# Patient Record
Sex: FEMALE | Race: OTHER | NOT HISPANIC OR LATINO
[De-identification: names, ages, dates, MRNs, and addresses within clinical notes are randomized per-mention and may not be internally consistent; named-entity substitution may affect disease eponyms.]

---

## 2024-01-03 PROBLEM — Z00.00 ENCOUNTER FOR PREVENTIVE HEALTH EXAMINATION: Status: ACTIVE | Noted: 2024-01-03

## 2024-01-20 ENCOUNTER — APPOINTMENT (OUTPATIENT)
Dept: ORTHOPEDIC SURGERY | Facility: CLINIC | Age: 84
End: 2024-01-20

## 2024-01-31 ENCOUNTER — APPOINTMENT (OUTPATIENT)
Dept: ORTHOPEDIC SURGERY | Facility: CLINIC | Age: 84
End: 2024-01-31
Payer: MEDICARE

## 2024-01-31 VITALS — WEIGHT: 176 LBS | BODY MASS INDEX: 29.32 KG/M2 | HEIGHT: 65 IN

## 2024-01-31 DIAGNOSIS — M17.12 UNILATERAL PRIMARY OSTEOARTHRITIS, LEFT KNEE: ICD-10-CM

## 2024-01-31 PROCEDURE — 73560 X-RAY EXAM OF KNEE 1 OR 2: CPT | Mod: LT

## 2024-01-31 PROCEDURE — 99203 OFFICE O/P NEW LOW 30 MIN: CPT

## 2024-01-31 NOTE — HISTORY OF PRESENT ILLNESS
[de-identified] : Pleasant woman 83 last visit 2019 for some sciatica and back pain Dr. Blackwell still medical doctor 4 to 5 months on again off again left knee pain occasionally takes Advil which helps she does have a cane pain is little worse at night weight is stable 175 pounds pain is 5-6 out of 10 no trauma does not smoke no drug allergies

## 2024-01-31 NOTE — IMAGING
[de-identified] : Pleasant knees were examined in no distress good posture mild spasm negative straight leg bilaterally has a cane in the proper right hand hip motion is full knee motion on the left limited with some swelling crepitus no instability or laxity mild pain medially calves are soft no edema negative Homans' sign reflexes brisk and symmetric  X-rays left knee mild arthritic change

## 2024-01-31 NOTE — ASSESSMENT
[FreeTextEntry1] : Mild arthritis recommended heat cane opposite hand Ace wrap is fine deferred on therapy with lacho albrecht she could try Mobic for a couple of months on a regular basis we could consider an injection next visit I saw no indication for knee replacement or an MRI at this time

## 2024-02-02 ENCOUNTER — INPATIENT (INPATIENT)
Facility: HOSPITAL | Age: 84
LOS: 4 days | Discharge: HOME | DRG: 66 | End: 2024-02-07
Attending: INTERNAL MEDICINE | Admitting: STUDENT IN AN ORGANIZED HEALTH CARE EDUCATION/TRAINING PROGRAM
Payer: MEDICARE

## 2024-02-02 VITALS
OXYGEN SATURATION: 98 % | HEIGHT: 65 IN | RESPIRATION RATE: 17 BRPM | SYSTOLIC BLOOD PRESSURE: 176 MMHG | TEMPERATURE: 99 F | DIASTOLIC BLOOD PRESSURE: 72 MMHG | HEART RATE: 77 BPM | WEIGHT: 179.9 LBS

## 2024-02-02 DIAGNOSIS — I63.50 CEREBRAL INFARCTION DUE TO UNSPECIFIED OCCLUSION OR STENOSIS OF UNSPECIFIED CEREBRAL ARTERY: ICD-10-CM

## 2024-02-02 LAB
ALBUMIN SERPL ELPH-MCNC: 4.2 G/DL — SIGNIFICANT CHANGE UP (ref 3.5–5.2)
ALP SERPL-CCNC: 95 U/L — SIGNIFICANT CHANGE UP (ref 30–115)
ALT FLD-CCNC: 18 U/L — SIGNIFICANT CHANGE UP (ref 0–41)
ANION GAP SERPL CALC-SCNC: 13 MMOL/L — SIGNIFICANT CHANGE UP (ref 7–14)
APPEARANCE UR: CLEAR — SIGNIFICANT CHANGE UP
AST SERPL-CCNC: 24 U/L — SIGNIFICANT CHANGE UP (ref 0–41)
BACTERIA # UR AUTO: NEGATIVE /HPF — SIGNIFICANT CHANGE UP
BASOPHILS # BLD AUTO: 0.03 K/UL — SIGNIFICANT CHANGE UP (ref 0–0.2)
BASOPHILS NFR BLD AUTO: 0.4 % — SIGNIFICANT CHANGE UP (ref 0–1)
BILIRUB SERPL-MCNC: 0.7 MG/DL — SIGNIFICANT CHANGE UP (ref 0.2–1.2)
BILIRUB UR-MCNC: NEGATIVE — SIGNIFICANT CHANGE UP
BUN SERPL-MCNC: 18 MG/DL — SIGNIFICANT CHANGE UP (ref 10–20)
CALCIUM SERPL-MCNC: 10 MG/DL — SIGNIFICANT CHANGE UP (ref 8.4–10.5)
CAST: 0 /LPF — SIGNIFICANT CHANGE UP (ref 0–4)
CHLORIDE SERPL-SCNC: 97 MMOL/L — LOW (ref 98–110)
CO2 SERPL-SCNC: 23 MMOL/L — SIGNIFICANT CHANGE UP (ref 17–32)
COLOR SPEC: YELLOW — SIGNIFICANT CHANGE UP
CREAT SERPL-MCNC: 0.7 MG/DL — SIGNIFICANT CHANGE UP (ref 0.7–1.5)
DIFF PNL FLD: NEGATIVE — SIGNIFICANT CHANGE UP
EGFR: 86 ML/MIN/1.73M2 — SIGNIFICANT CHANGE UP
EOSINOPHIL # BLD AUTO: 0.05 K/UL — SIGNIFICANT CHANGE UP (ref 0–0.7)
EOSINOPHIL NFR BLD AUTO: 0.6 % — SIGNIFICANT CHANGE UP (ref 0–8)
GLUCOSE SERPL-MCNC: 104 MG/DL — HIGH (ref 70–99)
GLUCOSE UR QL: NEGATIVE MG/DL — SIGNIFICANT CHANGE UP
HCT VFR BLD CALC: 40.2 % — SIGNIFICANT CHANGE UP (ref 37–47)
HGB BLD-MCNC: 14.2 G/DL — SIGNIFICANT CHANGE UP (ref 12–16)
IMM GRANULOCYTES NFR BLD AUTO: 0.2 % — SIGNIFICANT CHANGE UP (ref 0.1–0.3)
KETONES UR-MCNC: ABNORMAL MG/DL
LEUKOCYTE ESTERASE UR-ACNC: ABNORMAL
LIDOCAIN IGE QN: 49 U/L — SIGNIFICANT CHANGE UP (ref 7–60)
LYMPHOCYTES # BLD AUTO: 1.74 K/UL — SIGNIFICANT CHANGE UP (ref 1.2–3.4)
LYMPHOCYTES # BLD AUTO: 20.6 % — SIGNIFICANT CHANGE UP (ref 20.5–51.1)
MCHC RBC-ENTMCNC: 30.9 PG — SIGNIFICANT CHANGE UP (ref 27–31)
MCHC RBC-ENTMCNC: 35.3 G/DL — SIGNIFICANT CHANGE UP (ref 32–37)
MCV RBC AUTO: 87.4 FL — SIGNIFICANT CHANGE UP (ref 81–99)
MONOCYTES # BLD AUTO: 0.67 K/UL — HIGH (ref 0.1–0.6)
MONOCYTES NFR BLD AUTO: 7.9 % — SIGNIFICANT CHANGE UP (ref 1.7–9.3)
NEUTROPHILS # BLD AUTO: 5.92 K/UL — SIGNIFICANT CHANGE UP (ref 1.4–6.5)
NEUTROPHILS NFR BLD AUTO: 70.3 % — SIGNIFICANT CHANGE UP (ref 42.2–75.2)
NITRITE UR-MCNC: NEGATIVE — SIGNIFICANT CHANGE UP
NRBC # BLD: 0 /100 WBCS — SIGNIFICANT CHANGE UP (ref 0–0)
PH UR: 7 — SIGNIFICANT CHANGE UP (ref 5–8)
PLATELET # BLD AUTO: 207 K/UL — SIGNIFICANT CHANGE UP (ref 130–400)
PMV BLD: 10.9 FL — HIGH (ref 7.4–10.4)
POTASSIUM SERPL-MCNC: 3.7 MMOL/L — SIGNIFICANT CHANGE UP (ref 3.5–5)
POTASSIUM SERPL-SCNC: 3.7 MMOL/L — SIGNIFICANT CHANGE UP (ref 3.5–5)
PROT SERPL-MCNC: 7 G/DL — SIGNIFICANT CHANGE UP (ref 6–8)
PROT UR-MCNC: SIGNIFICANT CHANGE UP MG/DL
RBC # BLD: 4.6 M/UL — SIGNIFICANT CHANGE UP (ref 4.2–5.4)
RBC # FLD: 12.9 % — SIGNIFICANT CHANGE UP (ref 11.5–14.5)
RBC CASTS # UR COMP ASSIST: 1 /HPF — SIGNIFICANT CHANGE UP (ref 0–4)
SODIUM SERPL-SCNC: 133 MMOL/L — LOW (ref 135–146)
SP GR SPEC: 1.01 — SIGNIFICANT CHANGE UP (ref 1–1.03)
SQUAMOUS # UR AUTO: 1 /HPF — SIGNIFICANT CHANGE UP (ref 0–5)
TROPONIN T, HIGH SENSITIVITY RESULT: 16 NG/L — HIGH (ref 6–13)
UROBILINOGEN FLD QL: 0.2 MG/DL — SIGNIFICANT CHANGE UP (ref 0.2–1)
WBC # BLD: 8.43 K/UL — SIGNIFICANT CHANGE UP (ref 4.8–10.8)
WBC # FLD AUTO: 8.43 K/UL — SIGNIFICANT CHANGE UP (ref 4.8–10.8)
WBC UR QL: 2 /HPF — SIGNIFICANT CHANGE UP (ref 0–5)

## 2024-02-02 PROCEDURE — 70498 CT ANGIOGRAPHY NECK: CPT | Mod: 26,MA

## 2024-02-02 PROCEDURE — 80061 LIPID PANEL: CPT

## 2024-02-02 PROCEDURE — 83036 HEMOGLOBIN GLYCOSYLATED A1C: CPT

## 2024-02-02 PROCEDURE — 93005 ELECTROCARDIOGRAM TRACING: CPT

## 2024-02-02 PROCEDURE — 36415 COLL VENOUS BLD VENIPUNCTURE: CPT

## 2024-02-02 PROCEDURE — 70551 MRI BRAIN STEM W/O DYE: CPT

## 2024-02-02 PROCEDURE — 84443 ASSAY THYROID STIM HORMONE: CPT

## 2024-02-02 PROCEDURE — 97116 GAIT TRAINING THERAPY: CPT | Mod: GP

## 2024-02-02 PROCEDURE — 70496 CT ANGIOGRAPHY HEAD: CPT | Mod: 26,MA

## 2024-02-02 PROCEDURE — 97110 THERAPEUTIC EXERCISES: CPT | Mod: GP

## 2024-02-02 PROCEDURE — 97162 PT EVAL MOD COMPLEX 30 MIN: CPT | Mod: GP

## 2024-02-02 PROCEDURE — 93306 TTE W/DOPPLER COMPLETE: CPT

## 2024-02-02 PROCEDURE — 70450 CT HEAD/BRAIN W/O DYE: CPT

## 2024-02-02 PROCEDURE — 97530 THERAPEUTIC ACTIVITIES: CPT | Mod: GP

## 2024-02-02 PROCEDURE — 80048 BASIC METABOLIC PNL TOTAL CA: CPT

## 2024-02-02 PROCEDURE — 99291 CRITICAL CARE FIRST HOUR: CPT

## 2024-02-02 PROCEDURE — 70450 CT HEAD/BRAIN W/O DYE: CPT | Mod: 26,MA,59

## 2024-02-02 PROCEDURE — 85025 COMPLETE CBC W/AUTO DIFF WBC: CPT

## 2024-02-02 PROCEDURE — 83735 ASSAY OF MAGNESIUM: CPT

## 2024-02-02 PROCEDURE — 70551 MRI BRAIN STEM W/O DYE: CPT | Mod: 26

## 2024-02-02 PROCEDURE — 92610 EVALUATE SWALLOWING FUNCTION: CPT | Mod: GN

## 2024-02-02 PROCEDURE — 97167 OT EVAL HIGH COMPLEX 60 MIN: CPT | Mod: GO

## 2024-02-02 RX ORDER — ENOXAPARIN SODIUM 100 MG/ML
40 INJECTION SUBCUTANEOUS EVERY 24 HOURS
Refills: 0 | Status: DISCONTINUED | OUTPATIENT
Start: 2024-02-02 | End: 2024-02-07

## 2024-02-02 RX ORDER — CLOPIDOGREL BISULFATE 75 MG/1
75 TABLET, FILM COATED ORAL DAILY
Refills: 0 | Status: DISCONTINUED | OUTPATIENT
Start: 2024-02-03 | End: 2024-02-07

## 2024-02-02 RX ORDER — ASPIRIN/CALCIUM CARB/MAGNESIUM 324 MG
81 TABLET ORAL DAILY
Refills: 0 | Status: DISCONTINUED | OUTPATIENT
Start: 2024-02-03 | End: 2024-02-07

## 2024-02-02 RX ORDER — ATORVASTATIN CALCIUM 80 MG/1
80 TABLET, FILM COATED ORAL AT BEDTIME
Refills: 0 | Status: DISCONTINUED | OUTPATIENT
Start: 2024-02-02 | End: 2024-02-07

## 2024-02-02 RX ORDER — SODIUM CHLORIDE 9 MG/ML
1000 INJECTION INTRAMUSCULAR; INTRAVENOUS; SUBCUTANEOUS ONCE
Refills: 0 | Status: COMPLETED | OUTPATIENT
Start: 2024-02-02 | End: 2024-02-02

## 2024-02-02 RX ORDER — CLOPIDOGREL BISULFATE 75 MG/1
300 TABLET, FILM COATED ORAL ONCE
Refills: 0 | Status: COMPLETED | OUTPATIENT
Start: 2024-02-02 | End: 2024-02-02

## 2024-02-02 RX ORDER — ASPIRIN/CALCIUM CARB/MAGNESIUM 324 MG
324 TABLET ORAL ONCE
Refills: 0 | Status: COMPLETED | OUTPATIENT
Start: 2024-02-02 | End: 2024-02-02

## 2024-02-02 RX ORDER — ASPIRIN/CALCIUM CARB/MAGNESIUM 324 MG
300 TABLET ORAL DAILY
Refills: 0 | Status: DISCONTINUED | OUTPATIENT
Start: 2024-02-02 | End: 2024-02-02

## 2024-02-02 RX ORDER — AMLODIPINE BESYLATE 2.5 MG/1
10 TABLET ORAL DAILY
Refills: 0 | Status: DISCONTINUED | OUTPATIENT
Start: 2024-02-02 | End: 2024-02-07

## 2024-02-02 RX ADMIN — Medication 324 MILLIGRAM(S): at 16:23

## 2024-02-02 RX ADMIN — Medication 1.5 MILLIGRAM(S): at 19:37

## 2024-02-02 RX ADMIN — ATORVASTATIN CALCIUM 80 MILLIGRAM(S): 80 TABLET, FILM COATED ORAL at 21:39

## 2024-02-02 RX ADMIN — SODIUM CHLORIDE 1000 MILLILITER(S): 9 INJECTION INTRAMUSCULAR; INTRAVENOUS; SUBCUTANEOUS at 12:31

## 2024-02-02 RX ADMIN — CLOPIDOGREL BISULFATE 300 MILLIGRAM(S): 75 TABLET, FILM COATED ORAL at 16:22

## 2024-02-02 RX ADMIN — ENOXAPARIN SODIUM 40 MILLIGRAM(S): 100 INJECTION SUBCUTANEOUS at 21:39

## 2024-02-02 NOTE — ED ADULT NURSE NOTE - NSFALLUNIVINTERV_ED_ALL_ED
Bed/Stretcher in lowest position, wheels locked, appropriate side rails in place/Call bell, personal items and telephone in reach/Instruct patient to call for assistance before getting out of bed/chair/stretcher/Non-slip footwear applied when patient is off stretcher/[place] to call system/Physically safe environment - no spills, clutter or unnecessary equipment/Purposeful proactive rounding/Room/bathroom lighting operational, light cord in reach

## 2024-02-02 NOTE — H&P ADULT - HISTORY OF PRESENT ILLNESS
This is an 83 year old female with past medical history of HLD, HTN, L knee OA presenting for unsteadiness and inability to ambulate. Patient states she was in her normal state of health yesterday after and had dinner with her friends. Afterwards took her recently prescribed meloxicam for her L knee OA. Shortly after she started feeling off when walking. She reports feeling dizzy and unsteady when attempting to walk. Since yesterday, her symptoms have not improved and thus decided to seek medical help.  In the ED, vitals significant for /72  CTH notable for L cerebellar subacute infarct  CTA angio notable for Severe stenosis at the origin of the left vertebral artery, Moderate stenosis of the proximal right subclavian artery due to mixed calcified and noncalcified atherosclerotic plaque.    Admitted to stroke unit for further work up

## 2024-02-02 NOTE — H&P ADULT - NSHPLABSRESULTS_GEN_ALL_CORE
LABS:                        14.2   8.43  )-----------( 207      ( 02 Feb 2024 12:56 )             40.2     02-02    133<L>  |  97<L>  |  18  ----------------------------<  104<H>  3.7   |  23  |  0.7    Ca    10.0      02 Feb 2024 12:56    TPro  7.0  /  Alb  4.2  /  TBili  0.7  /  DBili  x   /  AST  24  /  ALT  18  /  AlkPhos  95  02-02        < from: CT Head No Cont (02.02.24 @ 14:15) >    Focal hypodensity is noted involving the left superior cerebellar   hemisphere likely reflecting acute or subacute ischemic change.    < end of copied text >    < from: CT Angio Neck w/ IV Cont (02.02.24 @ 14:26) >    Severe stenosis at the origin of the left vertebral artery due to   atherosclerotic calcification.    Moderate stenosis of the proximal right subclavian artery due to mixed   calcified and noncalcified atherosclerotic plaque.    < end of copied text >

## 2024-02-02 NOTE — ED ADULT TRIAGE NOTE - CHIEF COMPLAINT QUOTE
Pt. c/o abdominal pain and nausea x 1 day. As per pt. daughter, pt. started a new medication (Meloxicam) and has been having GI symptoms since. Pt. c/o nausea, denies V/D at this time. Pt. denies chest pain/SOB.

## 2024-02-02 NOTE — ED PROVIDER NOTE - CLINICAL SUMMARY MEDICAL DECISION MAKING FREE TEXT BOX
Nausea vomiting since yesterday unsteady gait left weakness.  Stroke workup performed.  Neurology consulted.  Labs were ordered and reviewed by me independently.  EKG was ordered and reviewed by me independently. Nausea vomiting since yesterday unsteady gait left weakness.  Stroke workup performed.  Neurology consulted.  Labs were ordered and reviewed by me independently.  EKG was ordered and reviewed by me independently.    Endoresed from Dr. Musa  Pt w/ cerebellar infarct  Awaiting neuro for evaluation and will likely be admit to stroke unit

## 2024-02-02 NOTE — H&P ADULT - NSHPPHYSICALEXAM_GEN_ALL_CORE
PHYSICAL EXAMINATION:  General: Well-developed, well nourished, in no acute distress.  Eyes: Conjunctiva and sclera clear.  Neurologic:  - Mental Status:  Alert, awake, oriented to person, place, and time; Speech is fluent with intact naming, repetition, and comprehension; Good overall fund of knowledge;   - Cranial Nerves II-XII:    II:  Visual fields are full to confrontation; Pupils are equal, round, and reactive to light.  III, IV, VI:  Extraocular movements are intact without nystagmus.  V:  Facial sensation is intact in the V1-V3 distribution bilaterally.  VII:  Face is symmetric with normal eye closure and smile  VIII:  Hearing is intact to finger rub.  IX, X:  Uvula is midline and soft palate rises symmetrically  XI:  Head turning and shoulder shrug are intact.  XII:  Tongue protrudes in the midline.  - Motor:  Strength is 5/5 throughout.  pronator drift in LUE, LLE drifts w/o hitting bed.  Normal muscle bulk and tone throughout.  - Reflexes:  2+ and symmetric at the biceps, triceps, brachioradialis, knees, and ankles.  Plantar responses flexor.  - Sensory:  Intact throughout to LT  - Coordination:  Finger-nose-finger and heel-knee-shin intact consistent with dysmetria on both LUE and LLE.    - Gait:   Deferred  NIHSS 3- ataxia L sided and LLE drift

## 2024-02-02 NOTE — PATIENT PROFILE ADULT - FALL HARM RISK - HARM RISK INTERVENTIONS

## 2024-02-02 NOTE — ED ADULT NURSE NOTE - OBJECTIVE STATEMENT
Pt here for abd pain & nausea after starting new medication. Pt here for abd pain, difficulty walking, & vomiting after starting meloxicam. Pt states she took her first dose yesterday & became symptomatic immediately after.

## 2024-02-02 NOTE — H&P ADULT - ASSESSMENT
This is an 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 likely due to small vessel dz vs A-A emboli    L cerebellar infarct - likely due to small vessel dz vs A-A emboli  - CT head non con: As above  - CTA H&N: As above  - S/p Load Aspirin 325mg and Plavix 300mg  - C/w Aspirin 81mg and Plavix 75mg daily  - Increase Atorvastatin to 80mg daily  - Order MRI Brain Non-con  - Obtain TTE  - HbA1c, TSH and Lipid panel  - Tele monitoring  - Physical therapy/Occupational therapy/Speech and Swallow/Physiatry eval  - Fall and Aspiration precautions  - Admit to Stroke Unit and Tele monitoring  - Neurochecks q4 hrs  - Provide stroke education    #HLD  - c/w lipitor 80mg daily  - obtain lipid profile      #HTN  - on Carvelol ER 20mg daily and Hctz 25mg daily  - hold for now  - SBP goal 120-180      #L knee OA  - no NSAIDS for now  - Tylenol PRN for pain  - Warm compresses as needed      #Misc:  - DVT Prophylaxis: Lovenox  - Diet: DASH  - GI Prophylaxis: None  - Activity: IAT  - Code status: Full code  - Dispo: Stroke Unit   This is an 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 likely due to small vessel dz vs A-A emboli    L cerebellar infarct - likely due to small vessel dz vs A-A emboli  - CT head non con: As above  - CTA H&N: As above  - S/p Load Aspirin 325mg and Plavix 300mg  - C/w Aspirin 81mg and Plavix 75mg daily  - Increase Atorvastatin to 80mg daily  - Order MRI Brain Non-con  - Obtain TTE  - HbA1c, TSH and Lipid panel  - Tele monitoring  - Physical therapy/Occupational therapy/Speech and Swallow/Physiatry eval  - Fall and Aspiration precautions  - Admit to Stroke Unit and Tele monitoring  - Neurochecks q4 hrs  - Provide stroke education    #HLD  - c/w lipitor 80mg daily  - obtain lipid profile      #HTN  - on Carvedilol ER 20mg daily and Hctz 25mg daily, amlodipine  - resume meds, hold carvidilol for now  - SBP goal 120-180      #L knee OA  - no NSAIDS for now  - Tylenol PRN for pain  - Warm compresses as needed      #Misc:  - DVT Prophylaxis: Lovenox  - Diet: DASH  - GI Prophylaxis: None  - Activity: IAT  - Code status: Full code  - Dispo: Stroke Unit   This is an 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 likely due to small vessel dz vs A-A emboli    L cerebellar infarct - likely due to small vessel dz vs A-A emboli  - CT head non con: As above  - CTA H&N: As above  - S/p Load Aspirin 325mg and Plavix 300mg  - C/w Aspirin 81mg and Plavix 75mg daily  - Increase Atorvastatin to 80mg daily  - Order MRI Brain Non-con - premedicate with ativan, already ordered  - Obtain TTE  - HbA1c, TSH and Lipid panel  - Tele monitoring  - Physical therapy/Occupational therapy/Speech and Swallow/Physiatry eval  - Fall and Aspiration precautions  - Admit to Stroke Unit and Tele monitoring  - Neurochecks q4 hrs  - Provide stroke education    #HLD  - c/w lipitor 80mg daily  - obtain lipid profile      #HTN  - on Carvedilol ER 20mg daily and Hctz 25mg daily, amlodipine  - resume meds, hold carvidilol for now  - SBP goal 120-180      #L knee OA  - no NSAIDS for now  - Tylenol PRN for pain  - Warm compresses as needed      #Misc:  - DVT Prophylaxis: Lovenox  - Diet: DASH  - GI Prophylaxis: None  - Activity: IAT  - Code status: Full code  - Dispo: Stroke Unit

## 2024-02-02 NOTE — ED PROVIDER NOTE - PHYSICAL EXAMINATION
CONSTITUTIONAL: Well-developed; well-nourished; in no acute distress.   SKIN: Warm, dry  HEAD: Normocephalic; atraumatic  EYES: PERRL, EOMI, normal sclera and conjunctiva   ENT: No nasal discharge; airway clear. MMM.  CARD:  Regular rate and rhythm. Normal S1, S2. 2+ radial pulses. Normal capillary refill.  RESP: No increased WOB. CTA b/l without wheezes, crackles, rhonchi  ABD: Normoactive BS. Soft, nontender, nondistended. No CVA tenderness  EXT: Normal ROM.   NEURO: AOx3. Ataxic.  CN intact.  Extremity strength 5/5.  Sensation intact and equal. No pronator drift.  PSYCH: Cooperative, appropriate.

## 2024-02-02 NOTE — ED PROVIDER NOTE - PROGRESS NOTE DETAILS
Authored by Dr. Musa: CT done.  I, Dr. Ramy Musa, discussed the case with Dr. Olmstead attending radiology attending -acute infarct cerebellum left. Neurology aware of the consultation Note authored by Dr. Musa: At this time, patient signout to Dr. Swami Pollock to follow up on consultation completion and dispo

## 2024-02-02 NOTE — ED PROVIDER NOTE - STUDIES
EKG - see results section for interpretation/Xray Image(s) - see wet read section for interpretation/CT Scan images

## 2024-02-02 NOTE — ED PROVIDER NOTE - OBJECTIVE STATEMENT
83-year-old female with PMH HTN, HLD, arthritis presenting for sudden onset nausea, vomiting, diarrhea, off-balance sensation since 4 PM yesterday.  Per patient and daughter who is bedside patient started meloxicam yesterday for arthritis and had 1 dose at 2 PM; patient and daughter believe that symptoms are a result of the meloxicam.  Per daughter patient has been unable to walk steadily on her own and has required daughter to stay with her since yesterday at 4 PM to help her do things like go to the bathroom.  Patient reports she initially had abdominal cramping but now resolved.  C/o frequent urination which has been ongoing for many years.  Denies fever, headache, chest pain, SOB, back pain, extremity weakness/numbness, dysuria, vision change.  No trauma.

## 2024-02-02 NOTE — ED PROVIDER NOTE - CRITICAL CARE ATTENDING CONTRIBUTION TO CARE
83-year-old female history of congestive heart failure rheumatoid arthritis was started on meloxicam took her first dose yesterday now comes in complaining of nausea and vomiting but no diarrhea.  Currently no abdominal pain but feels very weak and feels that her left leg is weak since yesterday lunchtime.  Unsteady gait.    vss, nontoxic well-appearing full sentences PERRL, EOMI, no photophobia, pink conj, anicteric, MMM, neck supple, CTAB, RRR, equal radial pulses bilat, abd soft nontender nondistended no peritoneal signs, no cva tend. no calves tend, no edema, no rashes, unable to walk as patient is very ataxic and unsteady on her feet which is new for the patient as per daughter

## 2024-02-02 NOTE — ED ADULT NURSE NOTE - NSFALLHARMRISKINTERV_ED_ALL_ED
Communicate risk of Fall with Harm to all staff, patient, and family/Provide visual cue: red socks, yellow wristband, yellow gown, etc/Reinforce activity limits and safety measures with patient and family/Use of alarms - bed, stretcher, chair and/or video monitoring/Bed in lowest position, wheels locked, appropriate side rails in place/Call bell, personal items and telephone in reach/Instruct patient to call for assistance before getting out of bed/chair/stretcher/Non-slip footwear applied when patient is off stretcher/[place] to call system/Physically safe environment - no spills, clutter or unnecessary equipment/Purposeful Proactive Rounding/Room/bathroom lighting operational, light cord in reach

## 2024-02-03 LAB
A1C WITH ESTIMATED AVERAGE GLUCOSE RESULT: 5.8 % — HIGH (ref 4–5.6)
ANION GAP SERPL CALC-SCNC: 12 MMOL/L — SIGNIFICANT CHANGE UP (ref 7–14)
BASOPHILS # BLD AUTO: 0.08 K/UL — SIGNIFICANT CHANGE UP (ref 0–0.2)
BASOPHILS NFR BLD AUTO: 1 % — SIGNIFICANT CHANGE UP (ref 0–1)
BUN SERPL-MCNC: 17 MG/DL — SIGNIFICANT CHANGE UP (ref 10–20)
CALCIUM SERPL-MCNC: 9.5 MG/DL — SIGNIFICANT CHANGE UP (ref 8.4–10.5)
CHLORIDE SERPL-SCNC: 103 MMOL/L — SIGNIFICANT CHANGE UP (ref 98–110)
CHOLEST SERPL-MCNC: 175 MG/DL — SIGNIFICANT CHANGE UP
CO2 SERPL-SCNC: 23 MMOL/L — SIGNIFICANT CHANGE UP (ref 17–32)
CREAT SERPL-MCNC: 0.7 MG/DL — SIGNIFICANT CHANGE UP (ref 0.7–1.5)
CULTURE RESULTS: SIGNIFICANT CHANGE UP
EGFR: 86 ML/MIN/1.73M2 — SIGNIFICANT CHANGE UP
EOSINOPHIL # BLD AUTO: 0.24 K/UL — SIGNIFICANT CHANGE UP (ref 0–0.7)
EOSINOPHIL NFR BLD AUTO: 2.9 % — SIGNIFICANT CHANGE UP (ref 0–8)
ESTIMATED AVERAGE GLUCOSE: 120 MG/DL — HIGH (ref 68–114)
GLUCOSE SERPL-MCNC: 89 MG/DL — SIGNIFICANT CHANGE UP (ref 70–99)
HCT VFR BLD CALC: 40.7 % — SIGNIFICANT CHANGE UP (ref 37–47)
HDLC SERPL-MCNC: 53 MG/DL — SIGNIFICANT CHANGE UP
HGB BLD-MCNC: 14 G/DL — SIGNIFICANT CHANGE UP (ref 12–16)
IMM GRANULOCYTES NFR BLD AUTO: 0.2 % — SIGNIFICANT CHANGE UP (ref 0.1–0.3)
LIPID PNL WITH DIRECT LDL SERPL: 105 MG/DL — HIGH
LYMPHOCYTES # BLD AUTO: 3.21 K/UL — SIGNIFICANT CHANGE UP (ref 1.2–3.4)
LYMPHOCYTES # BLD AUTO: 38.3 % — SIGNIFICANT CHANGE UP (ref 20.5–51.1)
MCHC RBC-ENTMCNC: 30.6 PG — SIGNIFICANT CHANGE UP (ref 27–31)
MCHC RBC-ENTMCNC: 34.4 G/DL — SIGNIFICANT CHANGE UP (ref 32–37)
MCV RBC AUTO: 89.1 FL — SIGNIFICANT CHANGE UP (ref 81–99)
MONOCYTES # BLD AUTO: 0.77 K/UL — HIGH (ref 0.1–0.6)
MONOCYTES NFR BLD AUTO: 9.2 % — SIGNIFICANT CHANGE UP (ref 1.7–9.3)
NEUTROPHILS # BLD AUTO: 4.06 K/UL — SIGNIFICANT CHANGE UP (ref 1.4–6.5)
NEUTROPHILS NFR BLD AUTO: 48.4 % — SIGNIFICANT CHANGE UP (ref 42.2–75.2)
NON HDL CHOLESTEROL: 122 MG/DL — SIGNIFICANT CHANGE UP
NRBC # BLD: 0 /100 WBCS — SIGNIFICANT CHANGE UP (ref 0–0)
PLATELET # BLD AUTO: 230 K/UL — SIGNIFICANT CHANGE UP (ref 130–400)
PMV BLD: 10.4 FL — SIGNIFICANT CHANGE UP (ref 7.4–10.4)
POTASSIUM SERPL-MCNC: 3.5 MMOL/L — SIGNIFICANT CHANGE UP (ref 3.5–5)
POTASSIUM SERPL-SCNC: 3.5 MMOL/L — SIGNIFICANT CHANGE UP (ref 3.5–5)
RBC # BLD: 4.57 M/UL — SIGNIFICANT CHANGE UP (ref 4.2–5.4)
RBC # FLD: 13.2 % — SIGNIFICANT CHANGE UP (ref 11.5–14.5)
SODIUM SERPL-SCNC: 138 MMOL/L — SIGNIFICANT CHANGE UP (ref 135–146)
SPECIMEN SOURCE: SIGNIFICANT CHANGE UP
TRIGL SERPL-MCNC: 88 MG/DL — SIGNIFICANT CHANGE UP
TSH SERPL-MCNC: 2.1 UIU/ML — SIGNIFICANT CHANGE UP (ref 0.27–4.2)
WBC # BLD: 8.38 K/UL — SIGNIFICANT CHANGE UP (ref 4.8–10.8)
WBC # FLD AUTO: 8.38 K/UL — SIGNIFICANT CHANGE UP (ref 4.8–10.8)

## 2024-02-03 PROCEDURE — 99232 SBSQ HOSP IP/OBS MODERATE 35: CPT

## 2024-02-03 PROCEDURE — 99233 SBSQ HOSP IP/OBS HIGH 50: CPT | Mod: GC

## 2024-02-03 RX ORDER — FAMOTIDINE 10 MG/ML
20 INJECTION INTRAVENOUS
Refills: 0 | Status: DISCONTINUED | OUTPATIENT
Start: 2024-02-03 | End: 2024-02-07

## 2024-02-03 RX ADMIN — Medication 81 MILLIGRAM(S): at 12:09

## 2024-02-03 RX ADMIN — AMLODIPINE BESYLATE 10 MILLIGRAM(S): 2.5 TABLET ORAL at 05:53

## 2024-02-03 RX ADMIN — CLOPIDOGREL BISULFATE 75 MILLIGRAM(S): 75 TABLET, FILM COATED ORAL at 12:09

## 2024-02-03 RX ADMIN — ENOXAPARIN SODIUM 40 MILLIGRAM(S): 100 INJECTION SUBCUTANEOUS at 20:42

## 2024-02-03 RX ADMIN — FAMOTIDINE 20 MILLIGRAM(S): 10 INJECTION INTRAVENOUS at 20:42

## 2024-02-03 RX ADMIN — ATORVASTATIN CALCIUM 80 MILLIGRAM(S): 80 TABLET, FILM COATED ORAL at 21:27

## 2024-02-03 NOTE — CONSULT NOTE ADULT - ASSESSMENT
IMPRESSION: Rehab of left cerebellar stroke with left hemiataxia. She is right handed. She has left knee OA; she is on Tylenol prn.     PRECAUTIONS: [x  ] Cardiac  [  ] Respiratory  [  ] Seizures [  ] Contact Isolation  [  ] Droplet Isolation  [  ] Other    Weight Bearing Status:     RECOMMENDATION:    Out of Bed to Chair     DVT/Decubiti Prophylaxis    REHAB PLAN:     [ x  ] Bedside P/T 3-5 times a week   [x   ]   Bedside O/T  2-3 times a week             [   ] No Rehab Therapy Indicated                   [   ]  Speech Therapy   Conditioning/ROM                                    ADL  Bed Mobility                                               Conditioning/ROM  Transfers                                                     Bed Mobility  Sitting /Standing Balance                         Transfers                                        Gait Training                                               Sitting/Standing Balance  Stair Training [   ]Applicable                    Home equipment Eval                                                                        Splinting  [   ] Only      GOALS:   ADL   [ x  ]   Independent                    Transfers  [ x  ] Independent                          Ambulation  [x   ] Independent     [  x  ] With device                            [   ]  CG                                                         [   ]  CG                                                                  [   ] CG                            [    ] Min A                                                   [   ] Min A                                                              [   ] Min  A          DISCHARGE PLAN:   [   ]  Good candidate for Intensive Rehabilitation/Hospital based-[facility]A [facility]                                             Will tolerate 3hrs Intensive Rehab Daily                                       [    ]  Short Term Rehab in Skilled Nursing Facility                                       [    ]  Home with Outpatient or VN services                                         [  xx  ]  Possible Candidate for Intensive Hospital based Rehab

## 2024-02-03 NOTE — SWALLOW BEDSIDE ASSESSMENT ADULT - SLP GENERAL OBSERVATIONS
Pt received in bed awake and alert on room air, ate 70% of breakfast, denies dysphagia and s/l changes

## 2024-02-03 NOTE — PROGRESS NOTE ADULT - ASSESSMENT
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 likely due to small vessel dz vs A-A emboli    L cerebellar infarct - likely due to small vessel dz vs A-A emboli  - CT head non con: As above  - CTA H&N: As above  - MRI as above  - S/p Load Aspirin 325mg and Plavix 300mg  - C/w Aspirin 81mg and Plavix 75mg daily  - c/w Atorvastatin to 80mg daily  - TTE pending  - HbA1c 5.8 , TSH 2.10 and   - Tele monitoring  - Physical therapy/Occupational therapy/Speech and Swallow/Physiatry eval  - Fall and Aspiration precautions  - Neurochecks q4 hrs  - Provide stroke education    #HLD  - c/w lipitor 80mg daily    #HTN  - on Carvedilol ER 20mg daily and Hctz 25mg daily, amlodipine  - resume meds, hold carvidilol for now  - SBP goal 120-180      #L knee OA  - no NSAIDS for now  - Tylenol PRN for pain  - Warm compresses as needed      #Misc:  - DVT Prophylaxis: Lovenox  - Diet: DASH  - GI Prophylaxis: None  - Activity: IAT  - Code status: Full code  - Dispo: Stroke Unit       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 likely due to small vessel dz vs A-A emboli    L cerebellar infarct - likely due to small vessel dz vs A-A emboli  - CT head non con: As above  - CTA H&N: As above  - MRI as above  - S/p Load Aspirin 325mg and Plavix 300mg  - C/w Aspirin 81mg and Plavix 75mg daily for 90 days  - c/w Atorvastatin to 80mg daily  - TTE pending  - May consult EP for ILR after TTE  - HbA1c 5.8 , TSH 2.10 and   - Tele monitoring  - Physical therapy/Occupational therapy/Speech and Swallow/Physiatry eval  - Fall and Aspiration precautions  - Neurochecks q4 hrs  - Provide stroke education    #HLD  - c/w lipitor 80mg daily    #HTN  - on Carvedilol ER 20mg daily and Hctz 25mg daily, amlodipine  - resume meds, hold carvidilol for now  - SBP goal 120-180      #L knee OA  - no NSAIDS for now  - Tylenol PRN for pain  - Warm compresses as needed      #Misc:  - DVT Prophylaxis: Lovenox  - Diet: DASH  - GI Prophylaxis: None  - Activity: IAT  - Code status: Full code  - Dispo: Stroke Unit

## 2024-02-03 NOTE — PROGRESS NOTE ADULT - SUBJECTIVE AND OBJECTIVE BOX
Pt seen and examined at bedside.    VITAL SIGNS (Last 24 hrs):  T(C): 36.1 (02-03-24 @ 12:00), Max: 36.8 (02-02-24 @ 15:37)  HR: 76 (02-03-24 @ 12:00) (60 - 84)  BP: 154/73 (02-03-24 @ 12:00) (152/65 - 167/75)  RR: 18 (02-03-24 @ 12:00) (18 - 18)  SpO2: 95% (02-03-24 @ 12:00) (95% - 97%)  Wt(kg): --  Daily Height in cm: 153.67 (02 Feb 2024 20:56)    Daily     I&O's Summary      PHYSICAL EXAM:  GENERAL: NAD, well-developed  HEAD:  Atraumatic, Normocephalic  EYES: EOMI, PERRLA, conjunctiva and sclera clear  NECK: Supple, No JVD  CHEST/LUNG: Clear to auscultation bilaterally; No wheeze  HEART: Regular rate and rhythm; No murmurs, rubs, or gallops  ABDOMEN: Soft, Nontender, Nondistended; Bowel sounds present  EXTREMITIES:  2+ Peripheral Pulses, No clubbing, cyanosis, or edema  PSYCH: AAOx3  SKIN: No rashes or lesions    Labs Reviewed  Spoke to patient in regards to abnormal labs.    CBC Full  -  ( 03 Feb 2024 06:17 )  WBC Count : 8.38 K/uL  Hemoglobin : 14.0 g/dL  Hematocrit : 40.7 %  Platelet Count - Automated : 230 K/uL  Mean Cell Volume : 89.1 fL  Mean Cell Hemoglobin : 30.6 pg  Mean Cell Hemoglobin Concentration : 34.4 g/dL  Auto Neutrophil # : 4.06 K/uL  Auto Lymphocyte # : 3.21 K/uL  Auto Monocyte # : 0.77 K/uL  Auto Eosinophil # : 0.24 K/uL  Auto Basophil # : 0.08 K/uL  Auto Neutrophil % : 48.4 %  Auto Lymphocyte % : 38.3 %  Auto Monocyte % : 9.2 %  Auto Eosinophil % : 2.9 %  Auto Basophil % : 1.0 %    BMP:    02-03 @ 06:17    Blood Urea Nitrogen - 17  Calcium - 9.5  Carbond Dioxide - 23  Chloride - 103  Creatinine - 0.7  Glucose - 89  Potassium - 3.5  Sodium - 138      Hemoglobin A1c -     Urine Culture:            MEDICATIONS  (STANDING):  amLODIPine   Tablet 10 milliGRAM(s) Oral daily  aspirin enteric coated 81 milliGRAM(s) Oral daily  atorvastatin 80 milliGRAM(s) Oral at bedtime  clopidogrel Tablet 75 milliGRAM(s) Oral daily  enoxaparin Injectable 40 milliGRAM(s) SubCutaneous every 24 hours  hydrochlorothiazide 25 milliGRAM(s) Oral daily    MEDICATIONS  (PRN):

## 2024-02-03 NOTE — SWALLOW BEDSIDE ASSESSMENT ADULT - SLP PERTINENT HISTORY OF CURRENT PROBLEM
This is an 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 likely due to small vessel dz vs A-A emboli

## 2024-02-03 NOTE — PROGRESS NOTE ADULT - SUBJECTIVE AND OBJECTIVE BOX
Neurology Progress Note    Interval History:    No acute events overnight.     HPI:  This is an 83 year old female with past medical history of HLD, HTN, L knee OA presenting for unsteadiness and inability to ambulate. Patient states she was in her normal state of health yesterday after and had dinner with her friends. Afterwards took her recently prescribed meloxicam for her L knee OA. Shortly after she started feeling off when walking. She reports feeling dizzy and unsteady when attempting to walk. Since yesterday, her symptoms have not improved and thus decided to seek medical help.  In the ED, vitals significant for /72  CTH notable for L cerebellar subacute infarct  CTA angio notable for Severe stenosis at the origin of the left vertebral artery, Moderate stenosis of the proximal right subclavian artery due to mixed calcified and noncalcified atherosclerotic plaque.    Admitted to stroke unit for further work up (02 Feb 2024 15:52)      PAST MEDICAL & SURGICAL HISTORY:  Hypertension    HLD (hyperlipidemia)    Osteoarthritis            Medications:  amLODIPine   Tablet 10 milliGRAM(s) Oral daily  aspirin enteric coated 81 milliGRAM(s) Oral daily  atorvastatin 80 milliGRAM(s) Oral at bedtime  clopidogrel Tablet 75 milliGRAM(s) Oral daily  enoxaparin Injectable 40 milliGRAM(s) SubCutaneous every 24 hours  hydrochlorothiazide 25 milliGRAM(s) Oral daily      Vital Signs Last 24 Hrs  T(C): 36.1 (03 Feb 2024 12:00), Max: 36.8 (02 Feb 2024 15:37)  T(F): 97 (03 Feb 2024 12:00), Max: 98.2 (02 Feb 2024 15:37)  HR: 76 (03 Feb 2024 12:00) (60 - 84)  BP: 154/73 (03 Feb 2024 12:00) (152/65 - 167/75)  BP(mean): 114 (03 Feb 2024 12:00) (93 - 118)  RR: 18 (03 Feb 2024 12:00) (18 - 18)  SpO2: 95% (03 Feb 2024 12:00) (95% - 97%)    Parameters below as of 03 Feb 2024 12:00  Patient On (Oxygen Delivery Method): room air        Neurological Exam:   - Mental Status:  Alert, awake, oriented to person, place, and time; Speech is fluent with intact naming, repetition, and comprehension; Good overall fund of knowledge;   - Cranial Nerves II-XII:    II:  Visual fields are full to confrontation; Pupils are equal, round, and reactive to light.  III, IV, VI:  Extraocular movements are intact without nystagmus.  V:  Facial sensation is intact in the V1-V3 distribution bilaterally.  VII:  Face is symmetric with normal eye closure and smile  VIII:  Hearing is intact to finger rub.  IX, X:  Uvula is midline and soft palate rises symmetrically  XI:  Head turning and shoulder shrug are intact.  XII:  Tongue protrudes in the midline.  - Motor:  Strength is 5/5 throughout.  pronator drift in LUE, LLE drifts w/o hitting bed.  Normal muscle bulk and tone throughout.  - Reflexes:  2+ and symmetric at the biceps, triceps, brachioradialis, knees, and ankles.  Plantar responses flexor.  - Sensory:  Intact throughout to LT  - Coordination:  Finger-nose-finger and heel-knee-shin intact consistent with dysmetria on both LUE and LLE.    - Gait:   Deferred  NIHSS 3- ataxia L sided and LLE     Labs:  CBC Full  -  ( 03 Feb 2024 06:17 )  WBC Count : 8.38 K/uL  RBC Count : 4.57 M/uL  Hemoglobin : 14.0 g/dL  Hematocrit : 40.7 %  Platelet Count - Automated : 230 K/uL  Mean Cell Volume : 89.1 fL  Mean Cell Hemoglobin : 30.6 pg  Mean Cell Hemoglobin Concentration : 34.4 g/dL  Auto Neutrophil # : 4.06 K/uL  Auto Lymphocyte # : 3.21 K/uL  Auto Monocyte # : 0.77 K/uL  Auto Eosinophil # : 0.24 K/uL  Auto Basophil # : 0.08 K/uL  Auto Neutrophil % : 48.4 %  Auto Lymphocyte % : 38.3 %  Auto Monocyte % : 9.2 %  Auto Eosinophil % : 2.9 %  Auto Basophil % : 1.0 %    02-03    138  |  103  |  17  ----------------------------<  89  3.5   |  23  |  0.7    Ca    9.5      03 Feb 2024 06:17    TPro  7.0  /  Alb  4.2  /  TBili  0.7  /  DBili  x   /  AST  24  /  ALT  18  /  AlkPhos  95  02-02    LIVER FUNCTIONS - ( 02 Feb 2024 12:56 )  Alb: 4.2 g/dL / Pro: 7.0 g/dL / ALK PHOS: 95 U/L / ALT: 18 U/L / AST: 24 U/L / GGT: x             Urinalysis Basic - ( 03 Feb 2024 06:17 )    Color: x / Appearance: x / SG: x / pH: x  Gluc: 89 mg/dL / Ketone: x  / Bili: x / Urobili: x   Blood: x / Protein: x / Nitrite: x   Leuk Esterase: x / RBC: x / WBC x   Sq Epi: x / Non Sq Epi: x / Bacteria: x    Radiology:    < from: MR Head No Cont (02.02.24 @ 20:18) >  IMPRESSION:  Acute infarcts involving the left cerebellar hemisphere without   hemorrhagic transformation.    < end of copied text >

## 2024-02-03 NOTE — PROGRESS NOTE ADULT - ASSESSMENT
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 likely due to small vessel dz vs A-A emboli    L cerebellar infarct - likely due to small vessel dz vs A-A emboli  - C/w Aspirin 81mg and Plavix 75mg daily  - c/w Atorvastatin to 80mg daily  - TTE pending  - HbA1c 5.8 , TSH 2.10 and   - Tele monitoring  - Physical therapy/Occupational therapy/Speech and Swallow/Physiatry eval    #HLD  - c/w lipitor 80mg daily    #HTN  - on Carvedilol ER 20mg daily, Hctz 25mg daily, amlodipine  - resume once out of window for permissive HTN     #L knee OA  - no NSAIDS for now  - Tylenol PRN for pain    - DVT Prophylaxis: Lovenox

## 2024-02-03 NOTE — CONSULT NOTE ADULT - SUBJECTIVE AND OBJECTIVE BOX
HPI:  This is an 83 year old female with past medical history of HLD, HTN, L knee OA presenting for unsteadiness and inability to ambulate. Patient states she was in her normal state of health yesterday after and had dinner with her friends. Afterwards took her recently prescribed meloxicam for her L knee OA. Shortly after she started feeling off when walking. She reports feeling dizzy and unsteady when attempting to walk. Since yesterday, her symptoms have not improved and thus decided to seek medical help.  In the ED, vitals significant for /72  CTH notable for L cerebellar subacute infarct  CTA angio notable for Severe stenosis at the origin of the left vertebral artery, Moderate stenosis of the proximal right subclavian artery due to mixed calcified and noncalcified atherosclerotic plaque.    Admitted to stroke unit for further work up (02 Feb 2024 15:52)      PAST MEDICAL & SURGICAL HISTORY:  Hypertension      HLD (hyperlipidemia)      Osteoarthritis          Hospital Course:    TODAY'S SUBJECTIVE & REVIEW OF SYMPTOMS:     Constitutional WNL   Cardio WNL   Resp WNL   GI WNL  Heme WNL  Endo WNL  Skin WNL  MSK WNL  Neuro WNL  Cognitive WNL  Psych WNL      MEDICATIONS  (STANDING):  amLODIPine   Tablet 10 milliGRAM(s) Oral daily  aspirin enteric coated 81 milliGRAM(s) Oral daily  atorvastatin 80 milliGRAM(s) Oral at bedtime  clopidogrel Tablet 75 milliGRAM(s) Oral daily  enoxaparin Injectable 40 milliGRAM(s) SubCutaneous every 24 hours  hydrochlorothiazide 25 milliGRAM(s) Oral daily    MEDICATIONS  (PRN):      FAMILY HISTORY:      Allergies    No Known Allergies    Intolerances        SOCIAL HISTORY:    [  ] Etoh  [  ] Smoking  [  ] Substance abuse     Home Environment:  [x  ] Home Alone in apt  [x  ] Lives with Family-however, dtr lives in downstairs apt in same house  [  ] Home Health Aid    Dwelling:  [  ] Apartment  [  ] Private House  [  ] Adult Home  [  ] Skilled Nursing Facility      [  ] Short Term  [  ] Long Term  [x  ] Stairs-5  [employer]     Elevator [  ]    FUNCTIONAL STATUS PTA: (Check all that apply)  Ambulation: [ x  ]Independent    [  ] Dependent     [  ] Non-Ambulatory  Assistive Device: [ x ] SA Cane  [  ]  Q Cane  [  ] Walker  [  ]  Wheelchair  ADL : [ x ] Independent  [  ]  Dependent       Vital Signs Last 24 Hrs  T(C): 36.1 (03 Feb 2024 12:00), Max: 36.5 (02 Feb 2024 20:56)  T(F): 97 (03 Feb 2024 12:00), Max: 97.7 (02 Feb 2024 20:56)  HR: 76 (03 Feb 2024 12:00) (60 - 76)  BP: 154/73 (03 Feb 2024 12:00) (152/65 - 167/75)  BP(mean): 114 (03 Feb 2024 12:00) (93 - 118)  RR: 18 (03 Feb 2024 12:00) (18 - 18)  SpO2: 95% (03 Feb 2024 12:00) (95% - 97%)    Parameters below as of 03 Feb 2024 12:00  Patient On (Oxygen Delivery Method): room air          PHYSICAL EXAM: Alert & Oriented X3  GENERAL: NAD, well-groomed, well-developed  HEAD:  Atraumatic, Normocephalic  EYES: EOMI, PERRLA, conjunctiva and sclera clear  NECK: Supple, No JVD, Normal thyroid  CHEST/LUNG: Clear bilaterally; No rales, rhonchi, wheezing, or rubs  HEART: Regular rate and rhythm; No murmurs, rubs, or gallops  ABDOMEN: Soft, Nontender, Nondistended; Bowel sounds present  EXTREMITIES:  2+ Peripheral Pulses, No clubbing, cyanosis, or edema    NERVOUS SYSTEM:  Cranial Nerves 2-12 intact [x  ] Abnormal  [  ]  ROM: WFL all extremities [ x ]  Abnormal [  ]  Motor Strength: WFL all extremities  [x  ]  Abnormal [  ]  Sensation: intact to light touch [x  ] Abnormal [  ]  Reflexes: Symmetric [ x ]  Abnormal [  ]  decreased left finger-nose and left heel to shin and rapid alternating movements of LUE and LLE    FUNCTIONAL STATUS:  Bed Mobility: Independent [  ]  Supervision [  ]  Needs Assistance [  ]  N/A [  ]  Transfers: Independent [  ]  Supervision [  ]  Needs Assistance [  ]  N/A [  ]   Ambulation: Independent [  ]  Supervision [  ]  Needs Assistance [  ]  N/A [  ]  ADL: Independent [  ] Requires Assistance [  ] N/A [  ]      LABS:                        14.0   8.38  )-----------( 230      ( 03 Feb 2024 06:17 )             40.7     02-03    138  |  103  |  17  ----------------------------<  89  3.5   |  23  |  0.7    Ca    9.5      03 Feb 2024 06:17    TPro  7.0  /  Alb  4.2  /  TBili  0.7  /  DBili  x   /  AST  24  /  ALT  18  /  AlkPhos  95  02-02      Urinalysis Basic - ( 03 Feb 2024 06:17 )    Color: x / Appearance: x / SG: x / pH: x  Gluc: 89 mg/dL / Ketone: x  / Bili: x / Urobili: x   Blood: x / Protein: x / Nitrite: x   Leuk Esterase: x / RBC: x / WBC x   Sq Epi: x / Non Sq Epi: x / Bacteria: x        RADIOLOGY & ADDITIONAL STUDIES:    Assesment:

## 2024-02-04 LAB
ANION GAP SERPL CALC-SCNC: 12 MMOL/L — SIGNIFICANT CHANGE UP (ref 7–14)
BASOPHILS # BLD AUTO: 0.08 K/UL — SIGNIFICANT CHANGE UP (ref 0–0.2)
BASOPHILS NFR BLD AUTO: 1 % — SIGNIFICANT CHANGE UP (ref 0–1)
BUN SERPL-MCNC: 12 MG/DL — SIGNIFICANT CHANGE UP (ref 10–20)
CALCIUM SERPL-MCNC: 9.7 MG/DL — SIGNIFICANT CHANGE UP (ref 8.4–10.5)
CHLORIDE SERPL-SCNC: 104 MMOL/L — SIGNIFICANT CHANGE UP (ref 98–110)
CO2 SERPL-SCNC: 24 MMOL/L — SIGNIFICANT CHANGE UP (ref 17–32)
CREAT SERPL-MCNC: 0.7 MG/DL — SIGNIFICANT CHANGE UP (ref 0.7–1.5)
EGFR: 86 ML/MIN/1.73M2 — SIGNIFICANT CHANGE UP
EOSINOPHIL # BLD AUTO: 0.38 K/UL — SIGNIFICANT CHANGE UP (ref 0–0.7)
EOSINOPHIL NFR BLD AUTO: 4.8 % — SIGNIFICANT CHANGE UP (ref 0–8)
GLUCOSE SERPL-MCNC: 100 MG/DL — HIGH (ref 70–99)
HCT VFR BLD CALC: 41 % — SIGNIFICANT CHANGE UP (ref 37–47)
HGB BLD-MCNC: 14.2 G/DL — SIGNIFICANT CHANGE UP (ref 12–16)
IMM GRANULOCYTES NFR BLD AUTO: 0.1 % — SIGNIFICANT CHANGE UP (ref 0.1–0.3)
LYMPHOCYTES # BLD AUTO: 2.76 K/UL — SIGNIFICANT CHANGE UP (ref 1.2–3.4)
LYMPHOCYTES # BLD AUTO: 34.9 % — SIGNIFICANT CHANGE UP (ref 20.5–51.1)
MAGNESIUM SERPL-MCNC: 1.9 MG/DL — SIGNIFICANT CHANGE UP (ref 1.8–2.4)
MCHC RBC-ENTMCNC: 30.7 PG — SIGNIFICANT CHANGE UP (ref 27–31)
MCHC RBC-ENTMCNC: 34.6 G/DL — SIGNIFICANT CHANGE UP (ref 32–37)
MCV RBC AUTO: 88.6 FL — SIGNIFICANT CHANGE UP (ref 81–99)
MONOCYTES # BLD AUTO: 0.75 K/UL — HIGH (ref 0.1–0.6)
MONOCYTES NFR BLD AUTO: 9.5 % — HIGH (ref 1.7–9.3)
NEUTROPHILS # BLD AUTO: 3.93 K/UL — SIGNIFICANT CHANGE UP (ref 1.4–6.5)
NEUTROPHILS NFR BLD AUTO: 49.7 % — SIGNIFICANT CHANGE UP (ref 42.2–75.2)
NRBC # BLD: 0 /100 WBCS — SIGNIFICANT CHANGE UP (ref 0–0)
PLATELET # BLD AUTO: 230 K/UL — SIGNIFICANT CHANGE UP (ref 130–400)
PMV BLD: 10.3 FL — SIGNIFICANT CHANGE UP (ref 7.4–10.4)
POTASSIUM SERPL-MCNC: 3.5 MMOL/L — SIGNIFICANT CHANGE UP (ref 3.5–5)
POTASSIUM SERPL-SCNC: 3.5 MMOL/L — SIGNIFICANT CHANGE UP (ref 3.5–5)
RBC # BLD: 4.63 M/UL — SIGNIFICANT CHANGE UP (ref 4.2–5.4)
RBC # FLD: 13.1 % — SIGNIFICANT CHANGE UP (ref 11.5–14.5)
SODIUM SERPL-SCNC: 140 MMOL/L — SIGNIFICANT CHANGE UP (ref 135–146)
WBC # BLD: 7.91 K/UL — SIGNIFICANT CHANGE UP (ref 4.8–10.8)
WBC # FLD AUTO: 7.91 K/UL — SIGNIFICANT CHANGE UP (ref 4.8–10.8)

## 2024-02-04 PROCEDURE — 99232 SBSQ HOSP IP/OBS MODERATE 35: CPT

## 2024-02-04 PROCEDURE — 99233 SBSQ HOSP IP/OBS HIGH 50: CPT

## 2024-02-04 RX ORDER — CARVEDILOL PHOSPHATE 80 MG/1
20 CAPSULE, EXTENDED RELEASE ORAL DAILY
Refills: 0 | Status: DISCONTINUED | OUTPATIENT
Start: 2024-02-04 | End: 2024-02-04

## 2024-02-04 RX ORDER — HYDRALAZINE HCL 50 MG
10 TABLET ORAL ONCE
Refills: 0 | Status: COMPLETED | OUTPATIENT
Start: 2024-02-04 | End: 2024-02-04

## 2024-02-04 RX ORDER — LABETALOL HCL 100 MG
5 TABLET ORAL ONCE
Refills: 0 | Status: COMPLETED | OUTPATIENT
Start: 2024-02-04 | End: 2024-02-04

## 2024-02-04 RX ORDER — LABETALOL HCL 100 MG
10 TABLET ORAL ONCE
Refills: 0 | Status: DISCONTINUED | OUTPATIENT
Start: 2024-02-04 | End: 2024-02-04

## 2024-02-04 RX ORDER — TRAMADOL HYDROCHLORIDE 50 MG/1
25 TABLET ORAL ONCE
Refills: 0 | Status: DISCONTINUED | OUTPATIENT
Start: 2024-02-04 | End: 2024-02-04

## 2024-02-04 RX ADMIN — Medication 5 MILLIGRAM(S): at 20:16

## 2024-02-04 RX ADMIN — FAMOTIDINE 20 MILLIGRAM(S): 10 INJECTION INTRAVENOUS at 05:11

## 2024-02-04 RX ADMIN — ENOXAPARIN SODIUM 40 MILLIGRAM(S): 100 INJECTION SUBCUTANEOUS at 21:14

## 2024-02-04 RX ADMIN — ATORVASTATIN CALCIUM 80 MILLIGRAM(S): 80 TABLET, FILM COATED ORAL at 21:14

## 2024-02-04 RX ADMIN — TRAMADOL HYDROCHLORIDE 25 MILLIGRAM(S): 50 TABLET ORAL at 18:18

## 2024-02-04 RX ADMIN — Medication 10 MILLIGRAM(S): at 17:18

## 2024-02-04 RX ADMIN — AMLODIPINE BESYLATE 10 MILLIGRAM(S): 2.5 TABLET ORAL at 05:11

## 2024-02-04 RX ADMIN — Medication 81 MILLIGRAM(S): at 12:17

## 2024-02-04 RX ADMIN — CLOPIDOGREL BISULFATE 75 MILLIGRAM(S): 75 TABLET, FILM COATED ORAL at 12:17

## 2024-02-04 RX ADMIN — TRAMADOL HYDROCHLORIDE 25 MILLIGRAM(S): 50 TABLET ORAL at 19:18

## 2024-02-04 RX ADMIN — FAMOTIDINE 20 MILLIGRAM(S): 10 INJECTION INTRAVENOUS at 17:18

## 2024-02-04 NOTE — PROGRESS NOTE ADULT - SUBJECTIVE AND OBJECTIVE BOX
Neurology Stroke Progress Note    INTERVAL HPI/OVERNIGHT EVENTS: No acute overnight events   Patient seen and examined at bedside. She was resting comfortably and did not appear to be in distress. She denies any chest pain, diplopia, dizziness, SOB, N/V, headache.    MEDICATIONS  (STANDING):  amLODIPine   Tablet 10 milliGRAM(s) Oral daily  aspirin enteric coated 81 milliGRAM(s) Oral daily  atorvastatin 80 milliGRAM(s) Oral at bedtime  clopidogrel Tablet 75 milliGRAM(s) Oral daily  enoxaparin Injectable 40 milliGRAM(s) SubCutaneous every 24 hours  famotidine    Tablet 20 milliGRAM(s) Oral two times a day  hydrochlorothiazide 25 milliGRAM(s) Oral daily    MEDICATIONS  (PRN):    Allergies    No Known Allergies    Intolerances      Vital Signs Last 24 Hrs  T(C): 36.1 (04 Feb 2024 08:00), Max: 36.5 (04 Feb 2024 04:00)  T(F): 97 (04 Feb 2024 08:00), Max: 97.7 (04 Feb 2024 04:00)  HR: 74 (04 Feb 2024 08:00) (64 - 80)  BP: 164/77 (04 Feb 2024 08:00) (160/67 - 177/86)  BP(mean): 123 (04 Feb 2024 08:00) (96 - 134)  RR: 18 (04 Feb 2024 08:00) (18 - 18)  SpO2: 96% (04 Feb 2024 08:00) (95% - 97%)    Parameters below as of 04 Feb 2024 08:00  Patient On (Oxygen Delivery Method): room air        Physical exam:  General: No acute distress, awake and alert  Eyes: Anicteric sclerae, moist conjunctivae, see below for CNs  Neck: trachea midline, FROM, supple, no thyromegaly or lymphadenopathy  Cardiovascular: Regular rate and rhythm, no murmurs, rubs, or gallops. No carotid bruits.   Pulmonary: Anterior breath sounds clear bilaterally, no crackles or wheezing. No use of accessory muscles  GI: Abdomen soft, non-distended, non-tender  Extremities: Radial and DP pulses +2, no edema    Neurologic:  -Mental status: Awake, alert, oriented to person, place, and time. Speech is fluent with intact naming, repetition, and comprehension, no dysarthria. Recent and remote memory intact. Follows commands. Attention/concentration intact. Fund of knowledge appropriate.  -Cranial nerves:   II: Visual fields are full to confrontation.  III, IV, VI: Extraocular movements are intact without nystagmus. Pupils equally round and reactive to light  V:  Facial sensation V1-V3 equal and intact   VII: Face is symmetric with normal eye closure and smile  VIII: Hearing is bilaterally intact to finger rub  IX, X: Uvula is midline and soft palate rises symmetrically  XI: Head turning and shoulder shrug are intact.  XII: Tongue protrudes midline  Motor: Normal bulk and tone. No pronator drift. Strength bilateral upper extremity 5/5, bilateral lower extremities 5/5.  Rapid alternating movements intact and symmetric  Sensation: Intact to light touch bilaterally. No neglect or extinction on double simultaneous testing.  Coordination: Dysmetric left FTN     NIHSS 1      LABS:                        14.2   7.91  )-----------( 230      ( 04 Feb 2024 05:35 )             41.0     02-04    140  |  104  |  12  ----------------------------<  100<H>  3.5   |  24  |  0.7    Ca    9.7      04 Feb 2024 05:35  Mg     1.9     02-04    TPro  7.0  /  Alb  4.2  /  TBili  0.7  /  DBili  x   /  AST  24  /  ALT  18  /  AlkPhos  95  02-02      Urinalysis Basic - ( 04 Feb 2024 05:35 )    Color: x / Appearance: x / SG: x / pH: x  Gluc: 100 mg/dL / Ketone: x  / Bili: x / Urobili: x   Blood: x / Protein: x / Nitrite: x   Leuk Esterase: x / RBC: x / WBC x   Sq Epi: x / Non Sq Epi: x / Bacteria: x        RADIOLOGY & ADDITIONAL TESTS:

## 2024-02-04 NOTE — PHYSICAL THERAPY INITIAL EVALUATION ADULT - PERTINENT HX OF CURRENT PROBLEM, REHAB EVAL
This is an 83 year old female with past medical history of HLD, HTN, L knee OA presenting for unsteadiness and inability to ambulate. Patient states she was in her normal state of health yesterday after and had dinner with her friends. Afterwards took her recently prescribed meloxicam for her L knee OA. Shortly after she started feeling off when walking. She reports feeling dizzy and unsteady when attempting to walk. Since yesterday, her symptoms have not improved and thus decided to seek medical help.  In the ED, vitals significant for /72  CTH notable for L cerebellar subacute infarct  CTA angio notable for Severe stenosis at the origin of the left vertebral artery, Moderate stenosis of the proximal right subclavian artery due to mixed calcified and noncalcified atherosclerotic plaque.

## 2024-02-04 NOTE — PROGRESS NOTE ADULT - SUBJECTIVE AND OBJECTIVE BOX
Pt seen and examined at bedside.   No SOB, ,CP.     VITAL SIGNS (Last 24 hrs):  T(C): 36.1 (02-04-24 @ 08:00), Max: 36.5 (02-04-24 @ 04:00)  HR: 74 (02-04-24 @ 08:00) (64 - 80)  BP: 164/77 (02-04-24 @ 08:00) (160/67 - 177/86)  RR: 18 (02-04-24 @ 08:00) (18 - 18)  SpO2: 96% (02-04-24 @ 08:00) (95% - 97%)  Wt(kg): --  Daily     Daily     I&O's Summary    04 Feb 2024 07:01  -  04 Feb 2024 13:50  --------------------------------------------------------  IN: 0 mL / OUT: 1200 mL / NET: -1200 mL        PHYSICAL EXAM:  GENERAL: NAD  HEAD:  Atraumatic, Normocephalic  EYES: EOMI, PERRLA, conjunctiva and sclera clear  NECK: Supple, No JVD  CHEST/LUNG: Clear to auscultation bilaterally; No wheeze  HEART: Regular rate and rhythm; No murmurs, rubs, or gallops  ABDOMEN: Soft, Nontender, Nondistended; Bowel sounds present  EXTREMITIES:  2+ Peripheral Pulses, No clubbing, cyanosis, or edema  SKIN: No rashes or lesions    Labs Reviewed  Spoke to patient in regards to abnormal labs.    CBC Full  -  ( 04 Feb 2024 05:35 )  WBC Count : 7.91 K/uL  Hemoglobin : 14.2 g/dL  Hematocrit : 41.0 %  Platelet Count - Automated : 230 K/uL  Mean Cell Volume : 88.6 fL  Mean Cell Hemoglobin : 30.7 pg  Mean Cell Hemoglobin Concentration : 34.6 g/dL  Auto Neutrophil # : 3.93 K/uL  Auto Lymphocyte # : 2.76 K/uL  Auto Monocyte # : 0.75 K/uL  Auto Eosinophil # : 0.38 K/uL  Auto Basophil # : 0.08 K/uL  Auto Neutrophil % : 49.7 %  Auto Lymphocyte % : 34.9 %  Auto Monocyte % : 9.5 %  Auto Eosinophil % : 4.8 %  Auto Basophil % : 1.0 %    BMP:    02-04 @ 05:35    Blood Urea Nitrogen - 12  Calcium - 9.7  Carbond Dioxide - 24  Chloride - 104  Creatinine - 0.7  Glucose - 100  Potassium - 3.5  Sodium - 140      Hemoglobin A1c -     Urine Culture:  02-02 @ 14:01 Urine culture: --    Culture Results:   <10,000 CFU/mL Normal Urogenital Tracy  Method Type: --  Organism: --  Organism Identification: --  Specimen Source: Clean Catch Clean Catch (Midstream)            MEDICATIONS  (STANDING):  amLODIPine   Tablet 10 milliGRAM(s) Oral daily  aspirin enteric coated 81 milliGRAM(s) Oral daily  atorvastatin 80 milliGRAM(s) Oral at bedtime  clopidogrel Tablet 75 milliGRAM(s) Oral daily  enoxaparin Injectable 40 milliGRAM(s) SubCutaneous every 24 hours  famotidine    Tablet 20 milliGRAM(s) Oral two times a day  hydrochlorothiazide 25 milliGRAM(s) Oral daily    MEDICATIONS  (PRN):

## 2024-02-04 NOTE — PROGRESS NOTE ADULT - ASSESSMENT
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 likely due to small vessel dz vs A-A emboli    L cerebellar infarct - likely due to small vessel dz vs A-A emboli  - C/w Aspirin 81mg and Plavix 75mg daily  - c/w Atorvastatin to 80mg daily  - TTE pending  - HbA1c 5.8 , TSH 2.10 and   - Tele monitoring  - Physical therapy/Occupational therapy     #HLD  - c/w lipitor 80mg daily    #HTN  - on Carvedilol ER 20mg daily, Hctz 25mg daily, amlodipine  - resume once out of window for permissive HTN     #L knee OA  - no NSAIDS for now  - Tylenol PRN for pain    - DVT Prophylaxis: Lovenox      Spoke to daughters at bedside on 2/3  Spoke to son today

## 2024-02-04 NOTE — PROGRESS NOTE ADULT - ASSESSMENT
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 likely due to small vessel dz vs A-A emboli    L cerebellar infarct - likely due to small vessel dz vs A-A emboli  - MRI brain w/out: Acute infarcts involving the left cerebellar hemisphere without hemorrhagic transformation  - S/p Load Aspirin 325mg and Plavix 300mg  - C/w Aspirin 81mg and Plavix 75mg daily for 90 days  - c/w Atorvastatin to 80mg daily  - TTE pending  - May consult EP for ILR after TTE  - HbA1c 5.8 , TSH 2.10 and   - Tele monitoring  - Physical therapy/Occupational therapy/Speech and Swallow/Physiatry eval  - Fall and Aspiration precautions  - Neurochecks q4 hrs  - Provide stroke education  - SBP goal: 110-150    #HLD  - c/w lipitor 80mg daily    #HTN  - on Carvedilol ER 20mg daily and Hctz 25mg daily, amlodipine  - resume meds, hold carvidilol for now      #L knee OA  - no NSAIDS for now  - Tylenol PRN for pain  - Warm compresses as needed      #Misc:  - DVT Prophylaxis: Lovenox  - Diet: DASH  - GI Prophylaxis: None  - Activity: IAT  - Code status: Full code  - Dispo: downgraded to floor with tele

## 2024-02-04 NOTE — PHYSICAL THERAPY INITIAL EVALUATION ADULT - GAIT DISTANCE, PT EVAL
5 ft x 1 bed to chair. After seated rest break pt stood for 30 seconds, ambulated 4 small steps fwd/ bkwd. pt with decreased weight shifting, shuffling steps. Pt reports fearful of falling.

## 2024-02-04 NOTE — PHYSICAL THERAPY INITIAL EVALUATION ADULT - GENERAL OBSERVATIONS, REHAB EVAL
7:40-7:50 Pt encountered supine in bed in NAD. + primafit + tele.
13:10-13:42 Pt encountered semifowler in bed in NAD. + tele, primafit. BP supine: 153/68 HR 73 bpm, seated: 119/60 HR 93 bpm, standing 112/84  bpm, no c/o offered. Post tx 140/75 HR 90 bpm. Pt was 105-123 bpm with activity - pt reports fear of falling.

## 2024-02-04 NOTE — PHYSICAL THERAPY INITIAL EVALUATION ADULT - ADDITIONAL COMMENTS
Pt lives in pvt home with daughter, 5 steps to enter 1 flight to main level. Pt was independent prior to this admission.
pt lives with daughter in pvt home has 5 steps to enter, 1 flight to main level. Pt was independent and active prior to this admission  + drives.

## 2024-02-04 NOTE — PHYSICAL THERAPY INITIAL EVALUATION ADULT - ACTIVE RANGE OF MOTION EXAMINATION, REHAB EVAL
B shoulders ~ 90 degrees, pt reports hx of falls with shoulder injury/bilateral  lower extremity Active ROM was WFL (within functional limits)

## 2024-02-04 NOTE — PHYSICAL THERAPY INITIAL EVALUATION ADULT - SPECIFY REASON(S)
Current BP is 186/87, SBP parameters are for 110-160. Tasneem PORTER aware. Pt educated on goals for PT.

## 2024-02-05 PROCEDURE — 99233 SBSQ HOSP IP/OBS HIGH 50: CPT

## 2024-02-05 PROCEDURE — 93010 ELECTROCARDIOGRAM REPORT: CPT

## 2024-02-05 PROCEDURE — 99232 SBSQ HOSP IP/OBS MODERATE 35: CPT | Mod: GC

## 2024-02-05 RX ORDER — CARVEDILOL PHOSPHATE 80 MG/1
12.5 CAPSULE, EXTENDED RELEASE ORAL EVERY 12 HOURS
Refills: 0 | Status: DISCONTINUED | OUTPATIENT
Start: 2024-02-05 | End: 2024-02-06

## 2024-02-05 RX ORDER — HYDRALAZINE HCL 50 MG
5 TABLET ORAL ONCE
Refills: 0 | Status: COMPLETED | OUTPATIENT
Start: 2024-02-05 | End: 2024-02-05

## 2024-02-05 RX ORDER — POLYETHYLENE GLYCOL 3350 17 G/17G
17 POWDER, FOR SOLUTION ORAL DAILY
Refills: 0 | Status: DISCONTINUED | OUTPATIENT
Start: 2024-02-05 | End: 2024-02-07

## 2024-02-05 RX ADMIN — Medication 5 MILLIGRAM(S): at 20:19

## 2024-02-05 RX ADMIN — FAMOTIDINE 20 MILLIGRAM(S): 10 INJECTION INTRAVENOUS at 17:04

## 2024-02-05 RX ADMIN — ATORVASTATIN CALCIUM 80 MILLIGRAM(S): 80 TABLET, FILM COATED ORAL at 21:27

## 2024-02-05 RX ADMIN — AMLODIPINE BESYLATE 10 MILLIGRAM(S): 2.5 TABLET ORAL at 05:47

## 2024-02-05 RX ADMIN — CARVEDILOL PHOSPHATE 12.5 MILLIGRAM(S): 80 CAPSULE, EXTENDED RELEASE ORAL at 17:05

## 2024-02-05 RX ADMIN — Medication 5 MILLIGRAM(S): at 04:19

## 2024-02-05 RX ADMIN — FAMOTIDINE 20 MILLIGRAM(S): 10 INJECTION INTRAVENOUS at 05:47

## 2024-02-05 RX ADMIN — Medication 81 MILLIGRAM(S): at 12:16

## 2024-02-05 RX ADMIN — CLOPIDOGREL BISULFATE 75 MILLIGRAM(S): 75 TABLET, FILM COATED ORAL at 12:17

## 2024-02-05 RX ADMIN — Medication 5 MILLIGRAM(S): at 00:22

## 2024-02-05 RX ADMIN — ENOXAPARIN SODIUM 40 MILLIGRAM(S): 100 INJECTION SUBCUTANEOUS at 21:27

## 2024-02-05 RX ADMIN — POLYETHYLENE GLYCOL 3350 17 GRAM(S): 17 POWDER, FOR SOLUTION ORAL at 12:16

## 2024-02-05 NOTE — OCCUPATIONAL THERAPY INITIAL EVALUATION ADULT - NSOTDISCHREC_GEN_A_CORE
Pt requires assistance with ADLs and functional transfers. Will benefit from acute inpatient rehab on d/c

## 2024-02-05 NOTE — OCCUPATIONAL THERAPY INITIAL EVALUATION ADULT - LIVES WITH, PROFILE
dtr lives in side apartment, pt in pvt home, +5STE, +HR, + 1 flight ~10 [place], to main level with +bathtub/alone

## 2024-02-05 NOTE — PROGRESS NOTE ADULT - ASSESSMENT
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 likely due to small vessel dz vs A-A emboli    Acute L cerebellar infarct - likely due to small vessel dz vs A-A emboli  - MRI brain w/out: Acute infarcts involving the left cerebellar hemisphere without hemorrhagic transformation  - S/p Load Aspirin 325mg and Plavix 300mg  - C/w Aspirin 81mg and Plavix 75mg daily for 90 days  - c/w Atorvastatin to 80mg daily  - TTE pending  - HbA1c 5.8 , TSH 2.10 and   - Tele monitoring  - Physical therapy/Occupational therapy/Speech and Swallow/Physiatry eval  - Fall and Aspiration precautions  - Neurochecks q4 hrs  - Provide stroke education  - SBP goal: 110-150    #HLD  - c/w lipitor 80mg daily    #HTN  - on Carvedilol ER 20mg daily and Hctz 25mg daily, amlodipine  - resume meds    #L knee OA  - no NSAIDS for now  - Tylenol PRN for pain  - Warm/cold compresses as needed    #Nutrition/Fluids/Electrolytes   - replete K<4 and Mg <2  - ensure regular BMs  - consider Miralax BID, Senna    #DVT Px  SCDs  Heparin or Lovenox    #Progress Note Handoff  Pending: Clinical improvement and stability__x___PT____x____  Pt/Family discussion: Pt informed and agrees with the current plan  Disposition: Home______/SNF_______/4A______/To be determined____x____    My note supersedes the residents note should a discrepancy arise.    Chart and notes personally reviewed.  Care Discussed with Consultants/Other Providers/ Housestaff [ x] YES [ ] NO   Radiology, labs, old records personally reviewed.    discussed w/ housestaff, nursing, case management, neuro team    Attestation Statements:    Attestation Statements:  Risk Statement (NON-critical care).     On this date of service, level of risk to patient is considered: High.     Due to: acute stroke, stroke unit care, neuro checks, telemetry monitoring.    Time-based billing (NON-critical care).     50 minutes spent on total encounter. The necessity of the time spent during the encounter on this date of service was due to:     time spent on review of labs, imaging studies, old records, obtaining history, personally examining patient, counselling and communicating with patient/ family, entering orders for medications/tests/etc, discussions with other health care providers, documentation in electronic health records, independent interpretation of labs, imaging/procedure results and care coordination.     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 likely due to small vessel dz vs A-A emboli    Acute L cerebellar infarct - likely due to small vessel dz vs A-A emboli  - MRI brain w/out: Acute infarcts involving the left cerebellar hemisphere without hemorrhagic transformation  - S/p Load Aspirin 325mg and Plavix 300mg  - C/w Aspirin 81mg and Plavix 75mg daily for 90 days  - c/w Atorvastatin to 80mg daily  - TTE pending  - HbA1c 5.8 , TSH 2.10 and   - Tele monitoring  - Physical therapy/Occupational therapy/Speech and Swallow/Physiatry eval  - Fall and Aspiration precautions  - Neurochecks q4 hrs  - Provide stroke education  - SBP goal: 110-150    #HLD  - c/w lipitor 80mg daily    #HTN  - on Carvedilol ER 20mg daily and Hctz 25mg daily, amlodipine  - resume meds    #L knee OA  - no NSAIDS for now  - Tylenol PRN for pain  - Warm/cold compresses as needed    #Nutrition/Fluids/Electrolytes   - replete K<4 and Mg <2  - ensure regular BMs  - consider Miralax BID, Senna    #DVT Px  SCDs  Heparin or Lovenox        Chart and notes personally reviewed.  Care Discussed with Consultants/Other Providers/ Housestaff [ x] YES [ ] NO   Radiology, labs, old records personally reviewed.    discussed w/ housestaff, nursing, case management, neuro team, daughter    Attestation Statements:    Attestation Statements:  Risk Statement (NON-critical care).     On this date of service, level of risk to patient is considered: High.     Due to: acute stroke, stroke unit care, neuro checks, telemetry monitoring,     Time-based billing (NON-critical care).     50 minutes spent on total encounter. The necessity of the time spent during the encounter on this date of service was due to:     time spent on review of labs, imaging studies, old records, obtaining history, personally examining patient, counselling and communicating with patient/ family, entering orders for medications/tests/etc, discussions with other health care providers, documentation in electronic health records, independent interpretation of labs, imaging/procedure results and care coordination.

## 2024-02-05 NOTE — PROGRESS NOTE ADULT - ASSESSMENT
IMPRESSION: Rehab of left cerebellar stroke with left hemiataxia. She is right handed. She has left knee OA; she is on Tylenol prn. She requires mod assist for upper body and lower body dressing. She amb 15 ft with a RW with mod assist with PT today. She is awaiting an ECHO. The patient requires acute rehab with 3 hours of daily therapies at least 5 out of 7 days and close physiatry follow up.  I discussed the case with the patient, her dtr, hospitalist and the rehab .     PRECAUTIONS: [x  ] Cardiac  [  ] Respiratory  [  ] Seizures [  ] Contact Isolation  [  ] Droplet Isolation  [  ] Other    Weight Bearing Status:     RECOMMENDATION:    Out of Bed to Chair     DVT/Decubiti Prophylaxis    REHAB PLAN:     [ x  ] Bedside P/T 3-5 times a week   [x   ]   Bedside O/T  2-3 times a week             [   ] No Rehab Therapy Indicated                   [   ]  Speech Therapy   Conditioning/ROM                                    ADL  Bed Mobility                                               Conditioning/ROM  Transfers                                                     Bed Mobility  Sitting /Standing Balance                         Transfers                                        Gait Training                                               Sitting/Standing Balance  Stair Training [   ]Applicable                    Home equipment Eval                                                                        Splinting  [   ] Only      GOALS:   ADL   [ x  ]   Independent                    Transfers  [ x  ] Independent                          Ambulation  [x   ] Independent     [  x  ] With device                            [   ]  CG                                                         [   ]  CG                                                                  [   ] CG                            [    ] Min A                                                   [   ] Min A                                                              [   ] Min  A          DISCHARGE PLAN:   [xx   ]  Excellent candidate for Intensive Rehabilitation/Hospital based-4A [facility]                                             Will tolerate 3hrs Intensive Rehab Daily                                       [    ]  Short Term Rehab in Skilled Nursing Facility                                       [    ]  Home with Outpatient or VN services                                         [    ]  Possible Candidate for Intensive Hospital based Rehab                                        normal...

## 2024-02-05 NOTE — PROGRESS NOTE ADULT - ASSESSMENT
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 likely due to small vessel dz vs A-A emboli    L cerebellar infarct - likely due to small vessel dz vs A-A emboli  - MRI brain w/out: Acute infarcts involving the left cerebellar hemisphere without hemorrhagic transformation  - S/p Load Aspirin 325mg and Plavix 300mg  - C/w Aspirin 81mg and Plavix 75mg daily for 90 days  - c/w Atorvastatin to 80mg daily  - TTE pending  - May consult EP for ILR after TTE  - HbA1c 5.8 , TSH 2.10 and   - Tele monitoring  - Physical therapy/Occupational therapy/Speech and Swallow/Physiatry eval  - Fall and Aspiration precautions  - Neurochecks q4 hrs  - Provide stroke education  - SBP goal: 110-150    #HLD  - c/w lipitor 80mg daily    #HTN  - on Carvedilol ER 20mg daily and Hctz 25mg daily, amlodipine  - resume meds, hold carvidilol for now      #L knee OA  - no NSAIDS for now  - Tylenol PRN for pain  - Warm compresses as needed      #Misc:  - DVT Prophylaxis: Lovenox  - Diet: DASH  - GI Prophylaxis: None  - Activity: IAT  - Code status: Full code  - Dispo: downgraded to floor with tele       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 likely due to small vessel dz vs A-A emboli      PLAN   Neuro:   #L cerebellar infarct - likely due to small vessel dz vs A-A emboli  - MRI brain w/out: Acute infarcts involving the left cerebellar hemisphere without hemorrhagic transformation  - S/p Load Aspirin 325mg and Plavix 300mg  - C/w Aspirin 81mg and Plavix 75mg daily for 90 days  - c/w Atorvastatin to 80mg daily  - TTE pending. Escalated to expedite.   - May consult EP for ILR after TTE  - HbA1c 5.8 , TSH 2.10 and   - Tele monitoring  - Good candidate for 4A per physiatry  - Fall and Aspiration precautions  - Neurochecks q4 hrs  - Provide stroke education  - SBP goal: 110-150      Cardiovascular:   #HTN  #HLD  - c/w lipitor 80mg daily  - Started on Carvedilol 12.5mg BID. Monitor BP. If pt still requires pushes will increase antihypertensive meds.   - on Carvedilol ER 20mg daily and Hctz 25mg daily, amlodipine    Pulmonary:  - On room air  -  HOB at 45 degrees. aspiration precautions.     Gastrointestinal:   - Eating and drinking w/o problems  -  bowel regimen PRN    Renal:  - monitor electrolytes    ID:  - monitor fever and wbc.     Endocrine:   - monitor FG. Insulin prn if needed    Hematology:  -  monitor cbc.  - dvt ppx  lovenox    MSK:  #L knee OA  - no NSAIDS for now  - Tylenol PRN for pain  - Warm compresses as needed

## 2024-02-05 NOTE — PROGRESS NOTE ADULT - TIME BILLING
Rehab of left cerebellar stroke with left hemiataxia. She is right handed. She has left knee OA; she is on Tylenol prn. She requires mod assist for upper body and lower body dressing. She amb 15 ft with a RW with mod assist with PT today. She is awaiting an ECHO. The patient requires acute rehab with 3 hours of daily therapies at least 5 out of 7 days and close physiatry follow up.  I discussed the case with the patient, her dtr, hospitalist and the rehab .

## 2024-02-05 NOTE — PROGRESS NOTE ADULT - SUBJECTIVE AND OBJECTIVE BOX
Neurology Progress Note    Interval History:      HPI:  This is an 83 year old female with past medical history of HLD, HTN, L knee OA presenting for unsteadiness and inability to ambulate. Patient states she was in her normal state of health yesterday after and had dinner with her friends. Afterwards took her recently prescribed meloxicam for her L knee OA. Shortly after she started feeling off when walking. She reports feeling dizzy and unsteady when attempting to walk. Since yesterday, her symptoms have not improved and thus decided to seek medical help.  In the ED, vitals significant for /72  CTH notable for L cerebellar subacute infarct  CTA angio notable for Severe stenosis at the origin of the left vertebral artery, Moderate stenosis of the proximal right subclavian artery due to mixed calcified and noncalcified atherosclerotic plaque.    Admitted to stroke unit for further work up (02 Feb 2024 15:52)      PAST MEDICAL & SURGICAL HISTORY:  Hypertension    HLD (hyperlipidemia)    Osteoarthritis            Medications:  amLODIPine   Tablet 10 milliGRAM(s) Oral daily  aspirin enteric coated 81 milliGRAM(s) Oral daily  atorvastatin 80 milliGRAM(s) Oral at bedtime  clopidogrel Tablet 75 milliGRAM(s) Oral daily  enoxaparin Injectable 40 milliGRAM(s) SubCutaneous every 24 hours  famotidine    Tablet 20 milliGRAM(s) Oral two times a day  hydrochlorothiazide 25 milliGRAM(s) Oral daily      Vital Signs Last 24 Hrs  T(C): 36 (05 Feb 2024 04:00), Max: 36.7 (05 Feb 2024 00:00)  T(F): 96.8 (05 Feb 2024 04:00), Max: 98 (05 Feb 2024 00:00)  HR: 78 (05 Feb 2024 08:00) (64 - 92)  BP: 148/66 (05 Feb 2024 08:00) (123/58 - 184/84)  BP(mean): 98 (05 Feb 2024 08:00) (81 - 137)  RR: 18 (05 Feb 2024 08:00) (18 - 18)  SpO2: 96% (05 Feb 2024 08:00) (95% - 98%)    Parameters below as of 05 Feb 2024 04:00  Patient On (Oxygen Delivery Method): room air        Neurological Exam:   Mental status: Awake, alert and oriented x3.  Recent and remote memory intact.  Naming, repetition and comprehension intact.  Attention/concentration intact.  No dysarthria, no aphasia.  Fund of knowledge appropriate.    Cranial nerves: Pupils equally round and reactive to light, visual fields full, no nystagmus, extraocular muscles intact, V1 through V3 intact bilaterally and symmetric, face symmetric, hearing intact to finger rub, palate elevation symmetric, tongue was midline.  Motor:  MRC grading 5/5 b/l UE/LE.   strength 5/5.  Normal tone and bulk.  No abnormal movements.    Sensation: Intact to light touch, proprioception, and pinprick.   Coordination: No dysmetria on finger-to-nose and heel-to-shin.  No dysdiadokinesia.  Reflexes: 2+ in bilateral UE/LE, downgoing toes bilaterally. (-) Silver.  Gait: Narrow and steady. No ataxia.  Romberg negative    Labs:  CBC Full  -  ( 04 Feb 2024 05:35 )  WBC Count : 7.91 K/uL  RBC Count : 4.63 M/uL  Hemoglobin : 14.2 g/dL  Hematocrit : 41.0 %  Platelet Count - Automated : 230 K/uL  Mean Cell Volume : 88.6 fL  Mean Cell Hemoglobin : 30.7 pg  Mean Cell Hemoglobin Concentration : 34.6 g/dL  Auto Neutrophil # : 3.93 K/uL  Auto Lymphocyte # : 2.76 K/uL  Auto Monocyte # : 0.75 K/uL  Auto Eosinophil # : 0.38 K/uL  Auto Basophil # : 0.08 K/uL  Auto Neutrophil % : 49.7 %  Auto Lymphocyte % : 34.9 %  Auto Monocyte % : 9.5 %  Auto Eosinophil % : 4.8 %  Auto Basophil % : 1.0 %    02-04    140  |  104  |  12  ----------------------------<  100<H>  3.5   |  24  |  0.7    Ca    9.7      04 Feb 2024 05:35  Mg     1.9     02-04          Urinalysis Basic - ( 04 Feb 2024 05:35 )    Color: x / Appearance: x / SG: x / pH: x  Gluc: 100 mg/dL / Ketone: x  / Bili: x / Urobili: x   Blood: x / Protein: x / Nitrite: x   Leuk Esterase: x / RBC: x / WBC x   Sq Epi: x / Non Sq Epi: x / Bacteria: x        Assessment:  This is a 83y Female w/ h/o     Plan: Neurology Progress Note    Interval History:    Patient required IV pushes to keep BP on target overnight.     HPI:  This is an 83 year old female with past medical history of HLD, HTN, L knee OA presenting for unsteadiness and inability to ambulate. Patient states she was in her normal state of health yesterday after and had dinner with her friends. Afterwards took her recently prescribed meloxicam for her L knee OA. Shortly after she started feeling off when walking. She reports feeling dizzy and unsteady when attempting to walk. Since yesterday, her symptoms have not improved and thus decided to seek medical help.  In the ED, vitals significant for /72  CTH notable for L cerebellar subacute infarct  CTA angio notable for Severe stenosis at the origin of the left vertebral artery, Moderate stenosis of the proximal right subclavian artery due to mixed calcified and noncalcified atherosclerotic plaque.    Admitted to stroke unit for further work up (02 Feb 2024 15:52)      PAST MEDICAL & SURGICAL HISTORY:  Hypertension    HLD (hyperlipidemia)    Osteoarthritis            Medications:  amLODIPine   Tablet 10 milliGRAM(s) Oral daily  aspirin enteric coated 81 milliGRAM(s) Oral daily  atorvastatin 80 milliGRAM(s) Oral at bedtime  clopidogrel Tablet 75 milliGRAM(s) Oral daily  enoxaparin Injectable 40 milliGRAM(s) SubCutaneous every 24 hours  famotidine    Tablet 20 milliGRAM(s) Oral two times a day  hydrochlorothiazide 25 milliGRAM(s) Oral daily      Vital Signs Last 24 Hrs  T(C): 36 (05 Feb 2024 04:00), Max: 36.7 (05 Feb 2024 00:00)  T(F): 96.8 (05 Feb 2024 04:00), Max: 98 (05 Feb 2024 00:00)  HR: 78 (05 Feb 2024 08:00) (64 - 92)  BP: 148/66 (05 Feb 2024 08:00) (123/58 - 184/84)  BP(mean): 98 (05 Feb 2024 08:00) (81 - 137)  RR: 18 (05 Feb 2024 08:00) (18 - 18)  SpO2: 96% (05 Feb 2024 08:00) (95% - 98%)    Parameters below as of 05 Feb 2024 04:00  Patient On (Oxygen Delivery Method): room air        Neurological Exam:   Mental status: Awake, alert and oriented x3.  Recent and remote memory intact.  Naming, repetition and comprehension intact.  Attention/concentration intact.  No dysarthria, no aphasia.  Fund of knowledge appropriate.    Cranial nerves: Pupils equally round and reactive to light, visual fields full, no nystagmus, extraocular muscles intact, V1 through V3 intact bilaterally and symmetric, face symmetric, hearing intact to finger rub, palate elevation symmetric, tongue was midline.  Motor:  MRC grading 5/5 b/l UE/LE.   strength 5/5.  Normal tone and bulk.  No abnormal movements.    Sensation: Intact to light touch, proprioception, and pinprick.   Coordination: No dysmetria on finger-to-nose and heel-to-shin.  No dysdiadokinesia.  Reflexes: 2+ in bilateral UE/LE, downgoing toes bilaterally. (-) Silver.  Gait: Narrow and steady. No ataxia.  Romberg negative    Labs:  CBC Full  -  ( 04 Feb 2024 05:35 )  WBC Count : 7.91 K/uL  RBC Count : 4.63 M/uL  Hemoglobin : 14.2 g/dL  Hematocrit : 41.0 %  Platelet Count - Automated : 230 K/uL  Mean Cell Volume : 88.6 fL  Mean Cell Hemoglobin : 30.7 pg  Mean Cell Hemoglobin Concentration : 34.6 g/dL  Auto Neutrophil # : 3.93 K/uL  Auto Lymphocyte # : 2.76 K/uL  Auto Monocyte # : 0.75 K/uL  Auto Eosinophil # : 0.38 K/uL  Auto Basophil # : 0.08 K/uL  Auto Neutrophil % : 49.7 %  Auto Lymphocyte % : 34.9 %  Auto Monocyte % : 9.5 %  Auto Eosinophil % : 4.8 %  Auto Basophil % : 1.0 %    02-04    140  |  104  |  12  ----------------------------<  100<H>  3.5   |  24  |  0.7    Ca    9.7      04 Feb 2024 05:35  Mg     1.9     02-04          Urinalysis Basic - ( 04 Feb 2024 05:35 )    Color: x / Appearance: x / SG: x / pH: x  Gluc: 100 mg/dL / Ketone: x  / Bili: x / Urobili: x   Blood: x / Protein: x / Nitrite: x   Leuk Esterase: x / RBC: x / WBC x   Sq Epi: x / Non Sq Epi: x / Bacteria: x

## 2024-02-05 NOTE — OCCUPATIONAL THERAPY INITIAL EVALUATION ADULT - ADL RETRAINING, OT EVAL
Patient will perform upper body dressing with CGA by discharge. Patient will perform lower body dressing with CGA with use of appropriate adaptive equipment as needed by discharge.

## 2024-02-05 NOTE — OCCUPATIONAL THERAPY INITIAL EVALUATION ADULT - NS ASR FOLLOW COMMAND OT EVAL
100% of the time/able to follow multistep instructions/able to follow single-step instructions
patient

## 2024-02-05 NOTE — PROGRESS NOTE ADULT - SUBJECTIVE AND OBJECTIVE BOX
HPI:  This is an 83 year old female with past medical history of HLD, HTN, L knee OA presenting for unsteadiness and inability to ambulate. Patient states she was in her normal state of health yesterday after and had dinner with her friends. Afterwards took her recently prescribed meloxicam for her L knee OA. Shortly after she started feeling off when walking. She reports feeling dizzy and unsteady when attempting to walk. Since yesterday, her symptoms have not improved and thus decided to seek medical help.  In the ED, vitals significant for /72  CTH notable for L cerebellar subacute infarct  CTA angio notable for Severe stenosis at the origin of the left vertebral artery, Moderate stenosis of the proximal right subclavian artery due to mixed calcified and noncalcified atherosclerotic plaque.    Admitted to stroke unit for further work up (02 Feb 2024 15:52)      PAST MEDICAL & SURGICAL HISTORY:  Hypertension      HLD (hyperlipidemia)      Osteoarthritis          Hospital Course:    TODAY'S SUBJECTIVE & REVIEW OF SYMPTOMS:     Constitutional WNL   Cardio WNL   Resp WNL   GI WNL  Heme WNL  Endo WNL  Skin WNL  MSK WNL  Neuro WNL  Cognitive WNL  Psych WNL      MEDICATIONS  (STANDING):  amLODIPine   Tablet 10 milliGRAM(s) Oral daily  aspirin enteric coated 81 milliGRAM(s) Oral daily  atorvastatin 80 milliGRAM(s) Oral at bedtime  clopidogrel Tablet 75 milliGRAM(s) Oral daily  enoxaparin Injectable 40 milliGRAM(s) SubCutaneous every 24 hours  hydrochlorothiazide 25 milliGRAM(s) Oral daily    MEDICATIONS  (PRN):      FAMILY HISTORY:      Allergies    No Known Allergies    Intolerances        SOCIAL HISTORY:    [  ] Etoh  [  ] Smoking  [  ] Substance abuse     Home Environment:  [x  ] Home Alone in apt  [x  ] Lives with Family-however, dtr lives in downstairs apt in same house  [  ] Home Health Aid    Dwelling:  [  ] Apartment  [  ] Private House  [  ] Adult Home  [  ] Skilled Nursing Facility      [  ] Short Term  [  ] Long Term  [x  ] Stairs-5  CORNELIUS     Elevator [  ]    FUNCTIONAL STATUS PTA: (Check all that apply)  Ambulation: [ x  ]Independent    [  ] Dependent     [  ] Non-Ambulatory  Assistive Device: [ x ] SA Cane  [  ]  Q Cane  [  ] Walker  [  ]  Wheelchair  ADL : [ x ] Independent  [  ]  Dependent     Vital Signs Last 24 Hrs  T(C): 36.1 (05 Feb 2024 08:00), Max: 36.7 (05 Feb 2024 00:00)  T(F): 97 (05 Feb 2024 08:00), Max: 98 (05 Feb 2024 00:00)  HR: 78 (05 Feb 2024 08:00) (64 - 92)  BP: 148/66 (05 Feb 2024 08:00) (123/58 - 184/84)  BP(mean): 98 (05 Feb 2024 08:00) (81 - 137)  ABP: --  ABP(mean): --  RR: 18 (05 Feb 2024 08:00) (18 - 18)  SpO2: 96% (05 Feb 2024 08:00) (96% - 98%)    O2 Parameters below as of 05 Feb 2024 08:00  Patient On (Oxygen Delivery Method): room air    PHYSICAL EXAM: Alert & Oriented X3  GENERAL: NAD, well-groomed, well-developed  HEAD:  Atraumatic, Normocephalic  EYES: EOMI, PERRLA, conjunctiva and sclera clear  NECK: Supple, No JVD, Normal thyroid  CHEST/LUNG: Clear bilaterally; No rales, rhonchi, wheezing, or rubs  HEART: Regular rate and rhythm; No murmurs, rubs, or gallops  ABDOMEN: Soft, Nontender, Nondistended; Bowel sounds present  EXTREMITIES:  2+ Peripheral Pulses, No clubbing, cyanosis, or edema    NERVOUS SYSTEM:  Cranial Nerves 2-12 intact [x  ] Abnormal  [  ]  ROM: WFL all extremities [ x ]  Abnormal [  ]  Motor Strength: WFL all extremities  [x  ]  Abnormal [  ]  Sensation: intact to light touch [x  ] Abnormal [  ]  Reflexes: Symmetric [ x ]  Abnormal [  ]  decreased left finger-nose and left heel to shin and rapid alternating movements of LUE and LLE    FUNCTIONAL STATUS:  Bed Mobility: Independent [  ]  Supervision [  ]  Needs Assistance [  ]  N/A [  ]  Transfers: Independent [  ]  Supervision [  ]  Needs Assistance [  ]  N/A [  ]   Ambulation: Independent [  ]  Supervision [  ]  Needs Assistance [  ]  N/A [  ]  ADL: Independent [  ] Requires Assistance [  ] N/A [  ]      LABS:                        14.0   8.38  )-----------( 230      ( 03 Feb 2024 06:17 )             40.7     02-03    138  |  103  |  17  ----------------------------<  89  3.5   |  23  |  0.7    Ca    9.5      03 Feb 2024 06:17    TPro  7.0  /  Alb  4.2  /  TBili  0.7  /  DBili  x   /  AST  24  /  ALT  18  /  AlkPhos  95  02-02      Urinalysis Basic - ( 03 Feb 2024 06:17 )    Color: x / Appearance: x / SG: x / pH: x  Gluc: 89 mg/dL / Ketone: x  / Bili: x / Urobili: x   Blood: x / Protein: x / Nitrite: x   Leuk Esterase: x / RBC: x / WBC x   Sq Epi: x / Non Sq Epi: x / Bacteria: x        RADIOLOGY & ADDITIONAL STUDIES:    Assesment:

## 2024-02-05 NOTE — PROGRESS NOTE ADULT - SUBJECTIVE AND OBJECTIVE BOX
Patient is a 83y old  Female who presents with a chief complaint of Stroke (05 Feb 2024 08:48)    INTERVAL HPI/OVERNIGHT EVENTS: Patient was examined and seen at bedside. This morning pt is resting comfortably in bed and reports no new issues or overnight events. No complaints, feels better  ROS: Denies CP, SOB, AP, new weakness  All other systems reviewed and are within normal limits.  InitialHPI:  This is an 83 year old female with past medical history of HLD, HTN, L knee OA presenting for unsteadiness and inability to ambulate. Patient states she was in her normal state of health yesterday after and had dinner with her friends. Afterwards took her recently prescribed meloxicam for her L knee OA. Shortly after she started feeling off when walking. She reports feeling dizzy and unsteady when attempting to walk. Since yesterday, her symptoms have not improved and thus decided to seek medical help.  In the ED, vitals significant for /72  CTH notable for L cerebellar subacute infarct  CTA angio notable for Severe stenosis at the origin of the left vertebral artery, Moderate stenosis of the proximal right subclavian artery due to mixed calcified and noncalcified atherosclerotic plaque.    Admitted to stroke unit for further work up (02 Feb 2024 15:52)    PAST MEDICAL & SURGICAL HISTORY:  Hypertension      HLD (hyperlipidemia)      Osteoarthritis          General: NAD, AAO3  HEENT:  EOMI, no LAD  CV: S1 S2  Resp: decreased breath sounds at bases  GI: NT/ND/S +BS  MS: no clubbing/cyanosis/edema, + pulses b/l  Neuro: LUE dysmetria, motor 5/5, sensory intact; +reflexes thruout    tele: SR, nonspecific changes (on my own evaluation of tele monitor)    MEDICATIONS  (STANDING):  amLODIPine   Tablet 10 milliGRAM(s) Oral daily  aspirin enteric coated 81 milliGRAM(s) Oral daily  atorvastatin 80 milliGRAM(s) Oral at bedtime  carvedilol 12.5 milliGRAM(s) Oral every 12 hours  clopidogrel Tablet 75 milliGRAM(s) Oral daily  enoxaparin Injectable 40 milliGRAM(s) SubCutaneous every 24 hours  famotidine    Tablet 20 milliGRAM(s) Oral two times a day  hydrochlorothiazide 25 milliGRAM(s) Oral daily  polyethylene glycol 3350 17 Gram(s) Oral daily    MEDICATIONS  (PRN):    Vital Signs Last 24 Hrs  T(C): 36.1 (05 Feb 2024 08:00), Max: 36.7 (05 Feb 2024 00:00)  T(F): 97 (05 Feb 2024 08:00), Max: 98 (05 Feb 2024 00:00)  HR: 78 (05 Feb 2024 08:00) (64 - 92)  BP: 148/66 (05 Feb 2024 08:00) (123/58 - 184/84)  BP(mean): 98 (05 Feb 2024 08:00) (81 - 137)  RR: 18 (05 Feb 2024 08:00) (18 - 18)  SpO2: 96% (05 Feb 2024 08:00) (96% - 98%)    Parameters below as of 05 Feb 2024 08:00  Patient On (Oxygen Delivery Method): room air      CAPILLARY BLOOD GLUCOSE                              14.2   7.91  )-----------( 230      ( 04 Feb 2024 05:35 )             41.0     02-04    140  |  104  |  12  ----------------------------<  100<H>  3.5   |  24  |  0.7    Ca    9.7      04 Feb 2024 05:35  Mg     1.9     02-04              Urinalysis Basic - ( 04 Feb 2024 05:35 )    Color: x / Appearance: x / SG: x / pH: x  Gluc: 100 mg/dL / Ketone: x  / Bili: x / Urobili: x   Blood: x / Protein: x / Nitrite: x   Leuk Esterase: x / RBC: x / WBC x   Sq Epi: x / Non Sq Epi: x / Bacteria: x              Culture - Urine (collected 02 Feb 2024 14:01)  Source: Clean Catch Clean Catch (Midstream)  Final Report (03 Feb 2024 22:35):    <10,000 CFU/mL Normal Urogenital Tracy      Chart, Consultant(s) Notes Reviewed:  [x ] YES  [ ] NO  Care Discussed with Consultants/Other Providers/ Housestaff [ x] YES  [ ] NO  Radiology, labs, old available records personally reviewed.    < from: MR Head No Cont (02.02.24 @ 20:18) >  Acute infarcts involving the left cerebellar hemisphere without   hemorrhagic transformation.    < end of copied text >  < from: CT Angio Head w/ IV Cont (02.02.24 @ 14:27) >  Focal hypodensity is noted involving the left superior cerebellar   hemisphere likely reflecting acute or subacute ischemic change.    CTA HEAD:  Occlusion is noted of the distal left superior cerebellar artery.    Intracranial vessels are otherwise unremarkable.    CTA NECK:  Severe stenosis at the origin of the left vertebral artery due to   atherosclerotic calcification.    Moderate stenosis of the proximal right subclavian artery due to mixed   calcified and noncalcified atherosclerotic plaque.    < end of copied text >  < from: 12 Lead ECG (02.02.24 @ 13:08) >  Diagnosis Line Sinus rhythm [id number] degree A-V block  Right bundle branch block  Left posterior fascicular block  *** Bifascicular block ***  Abnormal ECG    < end of copied text >                       Patient is a 83y old  Female who presents with a chief complaint of Stroke (05 Feb 2024 08:48)    INTERVAL HPI/OVERNIGHT EVENTS: Patient was examined and seen at bedside. This morning pt is resting comfortably in bed and reports no new issues or overnight events. No complaints, feels better but still ataxic.  ROS: Denies CP, SOB, AP, new weakness  All other systems reviewed and are within normal limits.  InitialHPI:  This is an 83 year old female with past medical history of HLD, HTN, L knee OA presenting for unsteadiness and inability to ambulate. Patient states she was in her normal state of health yesterday after and had dinner with her friends. Afterwards took her recently prescribed meloxicam for her L knee OA. Shortly after she started feeling off when walking. She reports feeling dizzy and unsteady when attempting to walk. Since yesterday, her symptoms have not improved and thus decided to seek medical help.  In the ED, vitals significant for /72  CTH notable for L cerebellar subacute infarct  CTA angio notable for Severe stenosis at the origin of the left vertebral artery, Moderate stenosis of the proximal right subclavian artery due to mixed calcified and noncalcified atherosclerotic plaque.    Admitted to stroke unit for further work up (02 Feb 2024 15:52)    PAST MEDICAL & SURGICAL HISTORY:  Hypertension      HLD (hyperlipidemia)      Osteoarthritis          General: NAD, AAO3  HEENT:  EOMI, no LAD  CV: S1 S2  Resp: decreased breath sounds at bases  GI: NT/ND/S +BS  MS: no clubbing/cyanosis/edema, + pulses b/l  Neuro: LUE dysmetria, motor 5/5, sensory intact; +reflexes thruout    tele: SR, nonspecific changes (on my own evaluation of tele monitor)    MEDICATIONS  (STANDING):  amLODIPine   Tablet 10 milliGRAM(s) Oral daily  aspirin enteric coated 81 milliGRAM(s) Oral daily  atorvastatin 80 milliGRAM(s) Oral at bedtime  carvedilol 12.5 milliGRAM(s) Oral every 12 hours  clopidogrel Tablet 75 milliGRAM(s) Oral daily  enoxaparin Injectable 40 milliGRAM(s) SubCutaneous every 24 hours  famotidine    Tablet 20 milliGRAM(s) Oral two times a day  hydrochlorothiazide 25 milliGRAM(s) Oral daily  polyethylene glycol 3350 17 Gram(s) Oral daily    MEDICATIONS  (PRN):    Vital Signs Last 24 Hrs  T(C): 36.1 (05 Feb 2024 08:00), Max: 36.7 (05 Feb 2024 00:00)  T(F): 97 (05 Feb 2024 08:00), Max: 98 (05 Feb 2024 00:00)  HR: 78 (05 Feb 2024 08:00) (64 - 92)  BP: 148/66 (05 Feb 2024 08:00) (123/58 - 184/84)  BP(mean): 98 (05 Feb 2024 08:00) (81 - 137)  RR: 18 (05 Feb 2024 08:00) (18 - 18)  SpO2: 96% (05 Feb 2024 08:00) (96% - 98%)    Parameters below as of 05 Feb 2024 08:00  Patient On (Oxygen Delivery Method): room air      CAPILLARY BLOOD GLUCOSE                              14.2   7.91  )-----------( 230      ( 04 Feb 2024 05:35 )             41.0     02-04    140  |  104  |  12  ----------------------------<  100<H>  3.5   |  24  |  0.7    Ca    9.7      04 Feb 2024 05:35  Mg     1.9     02-04              Urinalysis Basic - ( 04 Feb 2024 05:35 )    Color: x / Appearance: x / SG: x / pH: x  Gluc: 100 mg/dL / Ketone: x  / Bili: x / Urobili: x   Blood: x / Protein: x / Nitrite: x   Leuk Esterase: x / RBC: x / WBC x   Sq Epi: x / Non Sq Epi: x / Bacteria: x              Culture - Urine (collected 02 Feb 2024 14:01)  Source: Clean Catch Clean Catch (Midstream)  Final Report (03 Feb 2024 22:35):    <10,000 CFU/mL Normal Urogenital Tracy      Chart, Consultant(s) Notes Reviewed:  [x ] YES  [ ] NO  Care Discussed with Consultants/Other Providers/ Housestaff [ x] YES  [ ] NO  Radiology, labs, old available records personally reviewed.    < from: MR Head No Cont (02.02.24 @ 20:18) >  Acute infarcts involving the left cerebellar hemisphere without   hemorrhagic transformation.    < end of copied text >  < from: CT Angio Head w/ IV Cont (02.02.24 @ 14:27) >  Focal hypodensity is noted involving the left superior cerebellar   hemisphere likely reflecting acute or subacute ischemic change.    CTA HEAD:  Occlusion is noted of the distal left superior cerebellar artery.    Intracranial vessels are otherwise unremarkable.    CTA NECK:  Severe stenosis at the origin of the left vertebral artery due to   atherosclerotic calcification.    Moderate stenosis of the proximal right subclavian artery due to mixed   calcified and noncalcified atherosclerotic plaque.    < end of copied text >  < from: 12 Lead ECG (02.02.24 @ 13:08) >  Diagnosis Line Sinus rhythm [id number] degree A-V block  Right bundle branch block  Left posterior fascicular block  *** Bifascicular block ***  Abnormal ECG    < end of copied text >

## 2024-02-05 NOTE — OCCUPATIONAL THERAPY INITIAL EVALUATION ADULT - GENERAL OBSERVATIONS, REHAB EVAL
Pt received during transfer to b/s chair with PT Erna completing PT evaluation, +tele, +BP cuff, +pulse oxi, +IV lock, agreeable to OT evaluation, left seated in b/s chair, vitals stable, RN aware, SBP within parameters throughout

## 2024-02-06 LAB
ANION GAP SERPL CALC-SCNC: 13 MMOL/L — SIGNIFICANT CHANGE UP (ref 7–14)
BASOPHILS # BLD AUTO: 0.08 K/UL — SIGNIFICANT CHANGE UP (ref 0–0.2)
BASOPHILS NFR BLD AUTO: 0.9 % — SIGNIFICANT CHANGE UP (ref 0–1)
BUN SERPL-MCNC: 25 MG/DL — HIGH (ref 10–20)
CALCIUM SERPL-MCNC: 9.9 MG/DL — SIGNIFICANT CHANGE UP (ref 8.4–10.5)
CHLORIDE SERPL-SCNC: 102 MMOL/L — SIGNIFICANT CHANGE UP (ref 98–110)
CO2 SERPL-SCNC: 23 MMOL/L — SIGNIFICANT CHANGE UP (ref 17–32)
CREAT SERPL-MCNC: 0.8 MG/DL — SIGNIFICANT CHANGE UP (ref 0.7–1.5)
EGFR: 74 ML/MIN/1.73M2 — SIGNIFICANT CHANGE UP
EOSINOPHIL # BLD AUTO: 0.17 K/UL — SIGNIFICANT CHANGE UP (ref 0–0.7)
EOSINOPHIL NFR BLD AUTO: 1.8 % — SIGNIFICANT CHANGE UP (ref 0–8)
GLUCOSE SERPL-MCNC: 101 MG/DL — HIGH (ref 70–99)
HCT VFR BLD CALC: 41.8 % — SIGNIFICANT CHANGE UP (ref 37–47)
HGB BLD-MCNC: 14.1 G/DL — SIGNIFICANT CHANGE UP (ref 12–16)
IMM GRANULOCYTES NFR BLD AUTO: 0.3 % — SIGNIFICANT CHANGE UP (ref 0.1–0.3)
LYMPHOCYTES # BLD AUTO: 2.64 K/UL — SIGNIFICANT CHANGE UP (ref 1.2–3.4)
LYMPHOCYTES # BLD AUTO: 28.7 % — SIGNIFICANT CHANGE UP (ref 20.5–51.1)
MAGNESIUM SERPL-MCNC: 2 MG/DL — SIGNIFICANT CHANGE UP (ref 1.8–2.4)
MCHC RBC-ENTMCNC: 30.4 PG — SIGNIFICANT CHANGE UP (ref 27–31)
MCHC RBC-ENTMCNC: 33.7 G/DL — SIGNIFICANT CHANGE UP (ref 32–37)
MCV RBC AUTO: 90.1 FL — SIGNIFICANT CHANGE UP (ref 81–99)
MONOCYTES # BLD AUTO: 0.89 K/UL — HIGH (ref 0.1–0.6)
MONOCYTES NFR BLD AUTO: 9.7 % — HIGH (ref 1.7–9.3)
NEUTROPHILS # BLD AUTO: 5.39 K/UL — SIGNIFICANT CHANGE UP (ref 1.4–6.5)
NEUTROPHILS NFR BLD AUTO: 58.6 % — SIGNIFICANT CHANGE UP (ref 42.2–75.2)
NRBC # BLD: 0 /100 WBCS — SIGNIFICANT CHANGE UP (ref 0–0)
PLATELET # BLD AUTO: 241 K/UL — SIGNIFICANT CHANGE UP (ref 130–400)
PMV BLD: 10.7 FL — HIGH (ref 7.4–10.4)
POTASSIUM SERPL-MCNC: 3.9 MMOL/L — SIGNIFICANT CHANGE UP (ref 3.5–5)
POTASSIUM SERPL-SCNC: 3.9 MMOL/L — SIGNIFICANT CHANGE UP (ref 3.5–5)
RBC # BLD: 4.64 M/UL — SIGNIFICANT CHANGE UP (ref 4.2–5.4)
RBC # FLD: 13.3 % — SIGNIFICANT CHANGE UP (ref 11.5–14.5)
SODIUM SERPL-SCNC: 138 MMOL/L — SIGNIFICANT CHANGE UP (ref 135–146)
WBC # BLD: 9.2 K/UL — SIGNIFICANT CHANGE UP (ref 4.8–10.8)
WBC # FLD AUTO: 9.2 K/UL — SIGNIFICANT CHANGE UP (ref 4.8–10.8)

## 2024-02-06 PROCEDURE — 93306 TTE W/DOPPLER COMPLETE: CPT | Mod: 26

## 2024-02-06 PROCEDURE — 70450 CT HEAD/BRAIN W/O DYE: CPT | Mod: 26

## 2024-02-06 PROCEDURE — 99223 1ST HOSP IP/OBS HIGH 75: CPT

## 2024-02-06 PROCEDURE — 99232 SBSQ HOSP IP/OBS MODERATE 35: CPT

## 2024-02-06 RX ORDER — HYDRALAZINE HCL 50 MG
10 TABLET ORAL ONCE
Refills: 0 | Status: COMPLETED | OUTPATIENT
Start: 2024-02-06 | End: 2024-02-06

## 2024-02-06 RX ORDER — CARVEDILOL PHOSPHATE 80 MG/1
12.5 CAPSULE, EXTENDED RELEASE ORAL EVERY 12 HOURS
Refills: 0 | Status: DISCONTINUED | OUTPATIENT
Start: 2024-02-06 | End: 2024-02-07

## 2024-02-06 RX ORDER — LISINOPRIL 2.5 MG/1
5 TABLET ORAL AT BEDTIME
Refills: 0 | Status: DISCONTINUED | OUTPATIENT
Start: 2024-02-06 | End: 2024-02-07

## 2024-02-06 RX ORDER — CARVEDILOL PHOSPHATE 80 MG/1
25 CAPSULE, EXTENDED RELEASE ORAL EVERY 12 HOURS
Refills: 0 | Status: DISCONTINUED | OUTPATIENT
Start: 2024-02-06 | End: 2024-02-06

## 2024-02-06 RX ADMIN — FAMOTIDINE 20 MILLIGRAM(S): 10 INJECTION INTRAVENOUS at 05:41

## 2024-02-06 RX ADMIN — ENOXAPARIN SODIUM 40 MILLIGRAM(S): 100 INJECTION SUBCUTANEOUS at 21:41

## 2024-02-06 RX ADMIN — LISINOPRIL 5 MILLIGRAM(S): 2.5 TABLET ORAL at 22:35

## 2024-02-06 RX ADMIN — AMLODIPINE BESYLATE 10 MILLIGRAM(S): 2.5 TABLET ORAL at 05:46

## 2024-02-06 RX ADMIN — CARVEDILOL PHOSPHATE 12.5 MILLIGRAM(S): 80 CAPSULE, EXTENDED RELEASE ORAL at 05:41

## 2024-02-06 RX ADMIN — POLYETHYLENE GLYCOL 3350 17 GRAM(S): 17 POWDER, FOR SOLUTION ORAL at 12:35

## 2024-02-06 RX ADMIN — CLOPIDOGREL BISULFATE 75 MILLIGRAM(S): 75 TABLET, FILM COATED ORAL at 12:35

## 2024-02-06 RX ADMIN — CARVEDILOL PHOSPHATE 12.5 MILLIGRAM(S): 80 CAPSULE, EXTENDED RELEASE ORAL at 18:19

## 2024-02-06 RX ADMIN — Medication 81 MILLIGRAM(S): at 12:35

## 2024-02-06 RX ADMIN — Medication 10 MILLIGRAM(S): at 00:22

## 2024-02-06 RX ADMIN — ATORVASTATIN CALCIUM 80 MILLIGRAM(S): 80 TABLET, FILM COATED ORAL at 21:34

## 2024-02-06 RX ADMIN — FAMOTIDINE 20 MILLIGRAM(S): 10 INJECTION INTRAVENOUS at 17:25

## 2024-02-06 NOTE — PROGRESS NOTE ADULT - SUBJECTIVE AND OBJECTIVE BOX
Patient is a 83y old  Female who presents with a chief complaint of Stroke (05 Feb 2024 08:48)    INTERVAL HPI/OVERNIGHT EVENTS: Patient was examined and seen at bedside. This morning pt is resting comfortably in bed and reports no new issues or overnight events. No complaints, still ataxic. Required IV push BP meds overnight.  ROS: Denies CP, SOB, AP, new weakness  All other systems reviewed and are within normal limits.  InitialHPI:  This is an 83 year old female with past medical history of HLD, HTN, L knee OA presenting for unsteadiness and inability to ambulate. Patient states she was in her normal state of health yesterday after and had dinner with her friends. Afterwards took her recently prescribed meloxicam for her L knee OA. Shortly after she started feeling off when walking. She reports feeling dizzy and unsteady when attempting to walk. Since yesterday, her symptoms have not improved and thus decided to seek medical help.  In the ED, vitals significant for /72  CTH notable for L cerebellar subacute infarct  CTA angio notable for Severe stenosis at the origin of the left vertebral artery, Moderate stenosis of the proximal right subclavian artery due to mixed calcified and noncalcified atherosclerotic plaque.    Admitted to stroke unit for further work up (02 Feb 2024 15:52)    PAST MEDICAL & SURGICAL HISTORY:  Hypertension      HLD (hyperlipidemia)      Osteoarthritis          General: NAD, AAO3  HEENT:  EOMI, no LAD  CV: S1 S2  Resp: decreased breath sounds at bases  GI: NT/ND/S +BS  MS: no clubbing/cyanosis/edema, + pulses b/l  Neuro: LUE dysmetria, motor 5/5, sensory intact; +reflexes thruout    tele: SR, nonspecific changes (on my own evaluation of tele monitor)          Home Medications:  atorvastatin 20 mg oral tablet: 1 tab(s) orally once a day (02 Feb 2024 16:43)  carvedilol 20 mg oral capsule, extended release: 1 cap(s) orally once a day (02 Feb 2024 16:43)  hydroCHLOROthiazide 25 mg oral tablet: 1 tab(s) orally once a day (02 Feb 2024 16:43)  Norvasc 10 mg oral tablet: 1 tab(s) orally once a day (02 Feb 2024 16:43)    MEDICATIONS  (STANDING):  amLODIPine   Tablet 10 milliGRAM(s) Oral daily  aspirin enteric coated 81 milliGRAM(s) Oral daily  atorvastatin 80 milliGRAM(s) Oral at bedtime  carvedilol 25 milliGRAM(s) Oral every 12 hours  clopidogrel Tablet 75 milliGRAM(s) Oral daily  enoxaparin Injectable 40 milliGRAM(s) SubCutaneous every 24 hours  famotidine    Tablet 20 milliGRAM(s) Oral two times a day  hydrochlorothiazide 25 milliGRAM(s) Oral daily  polyethylene glycol 3350 17 Gram(s) Oral daily    MEDICATIONS  (PRN):    Vital Signs Last 24 Hrs  T(C): 36.9 (06 Feb 2024 12:00), Max: 36.9 (06 Feb 2024 12:00)  T(F): 98.5 (06 Feb 2024 12:00), Max: 98.5 (06 Feb 2024 12:00)  HR: 70 (06 Feb 2024 12:00) (66 - 78)  BP: 116/57 (06 Feb 2024 12:00) (116/57 - 173/66)  BP(mean): 80 (06 Feb 2024 12:00) (80 - 105)  RR: 16 (06 Feb 2024 12:00) (16 - 19)  SpO2: 97% (06 Feb 2024 08:00) (97% - 98%)    Parameters below as of 06 Feb 2024 08:00  Patient On (Oxygen Delivery Method): room air      CAPILLARY BLOOD GLUCOSE        LABS:                        14.1   9.20  )-----------( 241      ( 06 Feb 2024 05:36 )             41.8     02-06    138  |  102  |  25<H>  ----------------------------<  101<H>  3.9   |  23  |  0.8    Ca    9.9      06 Feb 2024 05:36  Mg     2.0     02-06              Urinalysis Basic - ( 06 Feb 2024 05:36 )    Color: x / Appearance: x / SG: x / pH: x  Gluc: 101 mg/dL / Ketone: x  / Bili: x / Urobili: x   Blood: x / Protein: x / Nitrite: x   Leuk Esterase: x / RBC: x / WBC x   Sq Epi: x / Non Sq Epi: x / Bacteria: x              Consultant Notes Reviewed:  [x ] YES  [ ] NO  Care Discussed with Consultants/Other Providers/ Housestaff [ x] YES  [ ] NO  Radiology, labs, EKGs, new studies personally reviewed.                                < from: MR Head No Cont (02.02.24 @ 20:18) >  Acute infarcts involving the left cerebellar hemisphere without   hemorrhagic transformation.    < end of copied text >  < from: CT Angio Head w/ IV Cont (02.02.24 @ 14:27) >  Focal hypodensity is noted involving the left superior cerebellar   hemisphere likely reflecting acute or subacute ischemic change.    CTA HEAD:  Occlusion is noted of the distal left superior cerebellar artery.    Intracranial vessels are otherwise unremarkable.    CTA NECK:  Severe stenosis at the origin of the left vertebral artery due to   atherosclerotic calcification.    Moderate stenosis of the proximal right subclavian artery due to mixed   calcified and noncalcified atherosclerotic plaque.    < end of copied text >  < from: 12 Lead ECG (02.02.24 @ 13:08) >  Diagnosis Line Sinus rhythm [id number] degree A-V block  Right bundle branch block  Left posterior fascicular block  *** Bifascicular block ***  Abnormal ECG    < end of copied text >

## 2024-02-06 NOTE — CONSULT NOTE ADULT - ASSESSMENT
This is an 83 year old female with past medical history of HLD, HTN, L knee OA presenting for unsteadiness and inability to ambulate.   CTH notable for L cerebellar subacute infarct.   CTA angio notable for Severe stenosis at the origin of the left vertebral artery, Moderate stenosis of the proximal right subclavian artery due to mixed calcified and noncalcified atherosclerotic plaque.   Brain MRI is remarkable for Acute infarcts involving the left cerebellar hemisphere without hemorrhagic transformation.  EP was consulted for evaluation for a loop recorder placement.     - acute ischemic CVA  - HTN  - HLD    Telemetry reviewed - SR, 1 brief episode of tachycardia 2-3 seconds seen and it appears to be atrial driven.   TTE noted, normal EF with midly enlarged LA   Cardiology: Dr. Miller    This is an 83 year old female with past medical history of HLD, HTN, L knee OA presenting for unsteadiness and inability to ambulate.   CTH notable for L cerebellar subacute infarct.   CTA angio notable for Severe stenosis at the origin of the left vertebral artery, Moderate stenosis of the proximal right subclavian artery due to mixed calcified and noncalcified atherosclerotic plaque.   Brain MRI is remarkable for Acute infarcts involving the left cerebellar hemisphere without hemorrhagic transformation.  EP was consulted for evaluation for a loop recorder placement.     - acute ischemic CVA  - HTN  - HLD    Telemetry reviewed - SR, 1 brief episode of tachycardia 2-3 seconds seen and it appears to be atrial driven.   TTE noted, normal EF with midly enlarged LA  TSH appreciated  Loop recorder implant is recommended to the patient for a long term cardiac arrhythmia monitoring to r/o arrhythmogenic causes of CVA. Procedure was discussed with the patient and her daughter at the bedside. They are agreeable and would like to proceed. Patient is being transferred to [facility] rehab tomorrow and would like the loop recorder to be done on Monday. Patient asked to postponed the procedure due to being overwhelmed. Will plan for Monday.

## 2024-02-06 NOTE — PROGRESS NOTE ADULT - ASSESSMENT
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 likely due to small vessel dz vs A-A emboli      PLAN   Neuro:   #L cerebellar infarct - likely due to small vessel dz vs A-A emboli  - MRI brain w/out: Acute infarcts involving the left cerebellar hemisphere without hemorrhagic transformation  - S/p Load Aspirin 325mg and Plavix 300mg  - C/w Aspirin 81mg and Plavix 75mg daily for 90 days  - c/w Atorvastatin to 80mg daily  - TTE Mildly enlarged L atria. Normal EF.  - EP consulted. ILR to be placed likely today,   - HbA1c 5.8 , TSH 2.10 and   - Tele monitoring  - Good candidate for 4[facility] per physiatry. Likely dc tomorrow.   - Fall and Aspiration precautions  - Neurochecks q4 hrs  - Provide stroke education  - SBP goal: 110-150      Cardiovascular:   #HTN  #HLD  - Patient requiring IV pushes at night. First dose of amlodipine 10mg received today. Carvedilol   - c/w lipitor 80mg daily  - Increased Carvedilol to 25mg BID. Monitor BP with holding parameters.    - c/w Hctz 25mg daily  - c/w amlodipine 10mg once daily     Pulmonary:  - On room air  -  HOB at 45 degrees. aspiration precautions.     Gastrointestinal:   - Eating and drinking w/o problems  -  bowel regimen PRN    Renal:  - monitor electrolytes    ID:  - monitor fever and wbc.     Endocrine:   - monitor FG. Insulin prn if needed    Hematology:  -  monitor cbc.  - dvt ppx  lovenox    MSK:  #L knee OA  - no NSAIDS for now  - Tylenol PRN for pain  - Warm compresses as needed               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 likely due to small vessel dz vs A-A emboli      PLAN   Neuro:   #L cerebellar infarct - likely due to small vessel dz vs A-A emboli  - MRI brain w/out: Acute infarcts involving the left cerebellar hemisphere without hemorrhagic transformation  - S/p Load Aspirin 325mg and Plavix 300mg  - C/w Aspirin 81mg and Plavix 75mg daily for 90 days  - c/w Atorvastatin to 80mg daily  - TTE Mildly enlarged L atria. Normal EF.  - EP consulted. ILR to be placed likely today,   - HbA1c 5.8 , TSH 2.10 and   - Tele monitoring  - Good candidate for 4[facility] per physiatry. Likely dc tomorrow.   - Fall and Aspiration precautions  - Neurochecks q4 hrs  - Provide stroke education  - SBP goal: 110-150      Cardiovascular:   #HTN  #HLD  - Patient requiring IV pushes at night. First dose of amlodipine 10mg received today. Carvedilol   - c/w lipitor 80mg daily  - Lisinopril 5mg at bedtime added to help with overnight HTN.    - c/w Hctz 25mg daily  - c/w amlodipine 10mg once daily     Pulmonary:  - On room air  -  HOB at 45 degrees. aspiration precautions.     Gastrointestinal:   - Eating and drinking w/o problems  -  bowel regimen PRN    Renal:  - monitor electrolytes    ID:  - monitor fever and wbc.     Endocrine:   - monitor FG. Insulin prn if needed    Hematology:  -  monitor cbc.  - dvt ppx  lovenox    MSK:  #L knee OA  - no NSAIDS for now  - Tylenol PRN for pain  - Warm compresses as needed

## 2024-02-06 NOTE — CONSULT NOTE ADULT - NS ATTEND AMEND GEN_ALL_CORE FT
Cryptogenic CVA    EKG, Echo, CTH, Tele, CTA independently interpreted and discussed with pt/daughter, team suggestive of cryptogenic CVA without any clear etiology idenitfied  Discussed need for long-term cardiac rhythm monitoring. Had a detailed discussion about options of MCOT+ ILR vs ILR. AFter evaluating pros-cons, patient/family opted for ILR, which is a reasonable approach. We will proceed with ILR placement.  Pt ok to be transferred to inpatient rehab  ILR being planned next week as add-on case as per patient s request  Tele

## 2024-02-06 NOTE — CHART NOTE - NSCHARTNOTEFT_GEN_A_CORE
Pt had NIHSS increase from 0 to 2, (for upper and lower left extremity dysmetria), pt also has end gaze nystagmus on PE, ordered urgent CTH non contrast Called to evaluate increase in NIH due to ataxia. Pt had NIHSS increase from 0 to 2, (for upper and lower left extremity dysmetria), pt also has end gaze nystagmus on PE, ordered urgent CTH non contrast    HR- 78  BP- 145/57  RR- 18  O2- 98% on RA Called to evaluate increase in NIH due to ataxia. Pt had NIHSS increase from 0 to 2, (for upper and lower left extremity dysmetria), pt also has end gaze nystagmus on PE, ordered urgent CTH non contrast  CTH prelim reported   No intracranial hemorrhage or midline shift.  Left cerebellar hypodense lesion consistent with evolving subacute infarct.  rEEG pending     HR- 78  BP- 145/57  RR- 18  O2- 98% on RA

## 2024-02-06 NOTE — CONSULT NOTE ADULT - SUBJECTIVE AND OBJECTIVE BOX
Patient is a 83y old  Female who presents with a chief complaint of Stroke (05 Feb 2024 14:11)        HPI:  This is an 83 year old female with past medical history of HLD, HTN, L knee OA presenting for unsteadiness and inability to ambulate. Patient states she was in her normal state of health yesterday after and had dinner with her friends. Afterwards took her recently prescribed meloxicam for her L knee OA. Shortly after she started feeling off when walking. She reports feeling dizzy and unsteady when attempting to walk. Since yesterday, her symptoms have not improved and thus decided to seek medical help.  In the ED, vitals significant for /72  CTH notable for L cerebellar subacute infarct  CTA angio notable for Severe stenosis at the origin of the left vertebral artery, Moderate stenosis of the proximal right subclavian artery due to mixed calcified and noncalcified atherosclerotic plaque.    Admitted to stroke unit for further work up (02 Feb 2024 15:52)      Electrophysiology:  83y Female    REVIEW OF SYSTEMS    [ ] A ten-point review of systems was otherwise negative except as noted.  [ ] Due to altered mental status/intubation, subjective information were not able to be obtained from the patient. History was obtained, to the extent possible, from review of the chart and collateral sources of information.      PAST MEDICAL & SURGICAL HISTORY:  Hypertension      HLD (hyperlipidemia)      Osteoarthritis          Home Medications:  atorvastatin 20 mg oral tablet: 1 tab(s) orally once a day (02 Feb 2024 16:43)  carvedilol 20 mg oral capsule, extended release: 1 cap(s) orally once a day (02 Feb 2024 16:43)  hydroCHLOROthiazide 25 mg oral tablet: 1 tab(s) orally once a day (02 Feb 2024 16:43)  Norvasc 10 mg oral tablet: 1 tab(s) orally once a day (02 Feb 2024 16:43)      Allergies:  No Known Allergies      FAMILY HISTORY:      SOCIAL HISTORY:    CIGARETTES:  ALCOHOL:  MARIJUANA:  ILLICIT DRUGS:        PREVIOUS DIAGNOSTIC TESTING:      ECHO  FINDINGS:    STRESS  FINDINGS:    CATHETERIZATION  FINDINGS:    ELECTROPHYSIOLOGY STUDY  FINDINGS:    CAROTID ULTRASOUND:  FINDINGS    VENOUS DUPLEX SCAN:  FINDINGS:    CHEST CT PULMONARY ANGIO with IV Contrast:  FINDINGS:      MEDICATIONS  (STANDING):  amLODIPine   Tablet 10 milliGRAM(s) Oral daily  aspirin enteric coated 81 milliGRAM(s) Oral daily  atorvastatin 80 milliGRAM(s) Oral at bedtime  carvedilol 12.5 milliGRAM(s) Oral every 12 hours  clopidogrel Tablet 75 milliGRAM(s) Oral daily  enoxaparin Injectable 40 milliGRAM(s) SubCutaneous every 24 hours  famotidine    Tablet 20 milliGRAM(s) Oral two times a day  hydrochlorothiazide 25 milliGRAM(s) Oral daily  polyethylene glycol 3350 17 Gram(s) Oral daily    MEDICATIONS  (PRN):      Vital Signs Last 24 Hrs  T(C): 36.9 (06 Feb 2024 12:00), Max: 36.9 (06 Feb 2024 12:00)  T(F): 98.5 (06 Feb 2024 12:00), Max: 98.5 (06 Feb 2024 12:00)  HR: 70 (06 Feb 2024 12:00) (66 - 84)  BP: 116/57 (06 Feb 2024 12:00) (116/57 - 173/66)  BP(mean): 80 (06 Feb 2024 12:00) (80 - 105)  RR: 16 (06 Feb 2024 12:00) (16 - 19)  SpO2: 97% (06 Feb 2024 08:00) (97% - 98%)    Parameters below as of 06 Feb 2024 08:00  Patient On (Oxygen Delivery Method): room air        PHYSICAL EXAM:    GENERAL: In no apparent distress, well nourished, and hydrated.  HEAD:  Atraumatic, Normocephalic  EYES: EOMI, PERRLA, conjunctiva and sclera clear  NECK: Supple and normal thyroid.  No JVD or carotid bruit.  Carotid pulse is 2+ bilaterally.  HEART: Regular rate and rhythm; No murmurs, rubs, or gallops.  PULMONARY: Clear to auscultation and perfusion.  No rales, wheezing, or rhonchi bilaterally.  ABDOMEN: Soft, Nontender, Nondistended; Bowel sounds present  EXTREMITIES:  2+ Peripheral Pulses, No clubbing, cyanosis, or edema  NEUROLOGICAL: Grossly nonfocal    I&O's Detail    Daily     Daily     INTERPRETATION OF TELEMETRY:    ECG:        LABS:                        14.1   9.20  )-----------( 241      ( 06 Feb 2024 05:36 )             41.8     02-06    138  |  102  |  25<H>  ----------------------------<  101<H>  3.9   |  23  |  0.8    Ca    9.9      06 Feb 2024 05:36  Mg     2.0     02-06            Urinalysis Basic - ( 06 Feb 2024 05:36 )    Color: x / Appearance: x / SG: x / pH: x  Gluc: 101 mg/dL / Ketone: x  / Bili: x / Urobili: x   Blood: x / Protein: x / Nitrite: x   Leuk Esterase: x / RBC: x / WBC x   Sq Epi: x / Non Sq Epi: x / Bacteria: x      BNP            RADIOLOGY & ADDITIONAL STUDIES:       This is an 83 year old female with past medical history of HLD, HTN, L knee OA presenting for unsteadiness and inability to ambulate. Patient states she was in her normal state of health yesterday after and had dinner with her friends. She reports feeling dizzy and unsteady when attempting to walk. Since yesterday, her symptoms have not improved and thus decided to seek medical help. CTH notable for L cerebellar subacute infarct. CTA angio notable for Severe stenosis at the origin of the left vertebral artery, Moderate stenosis of the proximal right subclavian artery due to mixed calcified and noncalcified atherosclerotic plaque. Patient is admitted to stroke unit for further work up. Brain MRI is remarkable for Acute infarcts involving the left cerebellar hemisphere without hemorrhagic transformation.  EP was consulted for evaluation for a loop recorder placement.     REVIEW OF SYSTEMS  [x] A ten-point review of systems was otherwise negative except as noted.  [ ] Due to altered mental status/intubation, subjective information were not able to be obtained from the patient. History was obtained, to the extent possible, from review of the chart and collateral sources of information.    PAST MEDICAL & SURGICAL HISTORY:  Hypertension  HLD (hyperlipidemia)  Osteoarthritis    Home Medications:  atorvastatin 20 mg oral tablet: 1 tab(s) orally once a day (02 Feb 2024 16:43)  carvedilol 20 mg oral capsule, extended release: 1 cap(s) orally once a day (02 Feb 2024 16:43)  hydroCHLOROthiazide 25 mg oral tablet: 1 tab(s) orally once a day (02 Feb 2024 16:43)  Norvasc 10 mg oral tablet: 1 tab(s) orally once a day (02 Feb 2024 16:43)    Allergies:  No Known Allergies    FAMILY HISTORY:    SOCIAL HISTORY:    CIGARETTES:  ALCOHOL:  MARIJUANA:  ILLICIT DRUGS:    PREVIOUS DIAGNOSTIC TESTING:    ECHO FINDINGS:   1. Hyperdynamic global left ventricular systolic function with a   non-hemodynamically significant mid cavitary gradient of <5mmHg. Left   ventricular ejection fraction, by visual estimation, is 70 to 75%. Mild   (Grade I) diastolic dysfunction. No regional wall motion abnormalities.   2. Normal right ventricular size and function.   3. Mildly enlarged left atrium.   4. Mildly sclerotic aortic valve with normal opening.   5. No echocardiographic evidence of pulmonary hypertension.   6. There is no evidence of pericardial effusion.    STRESS  FINDINGS:    CATHETERIZATION  FINDINGS:    ELECTROPHYSIOLOGY STUDY  FINDINGS:    CAROTID ULTRASOUND:  FINDINGS    VENOUS DUPLEX SCAN:  FINDINGS:    CHEST CT PULMONARY ANGIO with IV Contrast:  FINDINGS:    MEDICATIONS  (STANDING):  amLODIPine   Tablet 10 milliGRAM(s) Oral daily  aspirin enteric coated 81 milliGRAM(s) Oral daily  atorvastatin 80 milliGRAM(s) Oral at bedtime  carvedilol 12.5 milliGRAM(s) Oral every 12 hours  clopidogrel Tablet 75 milliGRAM(s) Oral daily  enoxaparin Injectable 40 milliGRAM(s) SubCutaneous every 24 hours  famotidine    Tablet 20 milliGRAM(s) Oral two times a day  hydrochlorothiazide 25 milliGRAM(s) Oral daily  polyethylene glycol 3350 17 Gram(s) Oral daily    MEDICATIONS  (PRN):    Vital Signs Last 24 Hrs  T(C): 36.9 (06 Feb 2024 12:00), Max: 36.9 (06 Feb 2024 12:00)  T(F): 98.5 (06 Feb 2024 12:00), Max: 98.5 (06 Feb 2024 12:00)  HR: 70 (06 Feb 2024 12:00) (66 - 84)  BP: 116/57 (06 Feb 2024 12:00) (116/57 - 173/66)  BP(mean): 80 (06 Feb 2024 12:00) (80 - 105)  RR: 16 (06 Feb 2024 12:00) (16 - 19)  SpO2: 97% (06 Feb 2024 08:00) (97% - 98%)    Parameters below as of 06 Feb 2024 08:00  Patient On (Oxygen Delivery Method): room air    PHYSICAL EXAM:  GENERAL: In no apparent distress, well nourished, and hydrated.  EYES: EOMI, PERRLA  NECK: Supple and normal thyroid.  HEART: Regular rate and rhythm  PULMONARY: Clear to auscultation and perfusion.   EXTREMITIES:  2+ Peripheral Pulses    LABS:                        14.1   9.20  )-----------( 241      ( 06 Feb 2024 05:36 )             41.8     02-06    138  |  102  |  25<H>  ----------------------------<  101<H>  3.9   |  23  |  0.8    Ca    9.9      06 Feb 2024 05:36  Mg     2.0     02-06    Urinalysis Basic - ( 06 Feb 2024 05:36 )  Color: x / Appearance: x / SG: x / pH: x  Gluc: 101 mg/dL / Ketone: x  / Bili: x / Urobili: x   Blood: x / Protein: x / Nitrite: x   Leuk Esterase: x / RBC: x / WBC x   Sq Epi: x / Non Sq Epi: x / Bacteria: x

## 2024-02-06 NOTE — PROGRESS NOTE ADULT - SUBJECTIVE AND OBJECTIVE BOX
Neurology Progress Note    Interval History:   Patient required 15mg of hydralazine over night. CTH repeated for possible worsening dysmetria, unchanged.      HPI:  This is an 83 year old female with past medical history of HLD, HTN, L knee OA presenting for unsteadiness and inability to ambulate. Patient states she was in her normal state of health yesterday after and had dinner with her friends. Afterwards took her recently prescribed meloxicam for her L knee OA. Shortly after she started feeling off when walking. She reports feeling dizzy and unsteady when attempting to walk. Since yesterday, her symptoms have not improved and thus decided to seek medical help.  In the ED, vitals significant for /72  CTH notable for L cerebellar subacute infarct  CTA angio notable for Severe stenosis at the origin of the left vertebral artery, Moderate stenosis of the proximal right subclavian artery due to mixed calcified and noncalcified atherosclerotic plaque.    Admitted to stroke unit for further work up (02 Feb 2024 15:52)      PAST MEDICAL & SURGICAL HISTORY:  Hypertension    HLD (hyperlipidemia)    Osteoarthritis      Medications:  amLODIPine   Tablet 10 milliGRAM(s) Oral daily  aspirin enteric coated 81 milliGRAM(s) Oral daily  atorvastatin 80 milliGRAM(s) Oral at bedtime  carvedilol 12.5 milliGRAM(s) Oral every 12 hours  clopidogrel Tablet 75 milliGRAM(s) Oral daily  enoxaparin Injectable 40 milliGRAM(s) SubCutaneous every 24 hours  famotidine    Tablet 20 milliGRAM(s) Oral two times a day  hydrochlorothiazide 25 milliGRAM(s) Oral daily  polyethylene glycol 3350 17 Gram(s) Oral daily      Vital Signs Last 24 Hrs  T(C): 36.9 (06 Feb 2024 12:00), Max: 36.9 (06 Feb 2024 12:00)  T(F): 98.5 (06 Feb 2024 12:00), Max: 98.5 (06 Feb 2024 12:00)  HR: 70 (06 Feb 2024 12:00) (66 - 84)  BP: 116/57 (06 Feb 2024 12:00) (116/57 - 173/66)  BP(mean): 80 (06 Feb 2024 12:00) (80 - 105)  RR: 16 (06 Feb 2024 12:00) (16 - 19)  SpO2: 97% (06 Feb 2024 08:00) (97% - 98%)    Parameters below as of 06 Feb 2024 08:00  Patient On (Oxygen Delivery Method): room air        Neurological Exam:   -Mental status: Awake, alert, oriented to person, place, and time. Speech is fluent with intact naming, repetition, and comprehension, no dysarthria. Recent and remote memory intact. Follows commands. Attention/concentration intact. Fund of knowledge appropriate.  -Cranial nerves:   II: Visual fields are full to confrontation.  III, IV, VI: Extraocular movements are intact without nystagmus. Pupils equally round and reactive to light  V:  Facial sensation V1-V3 equal and intact   VII: Face is symmetric with normal eye closure and smile  VIII: Hearing is bilaterally intact to finger rub  IX, X: Uvula is midline and soft palate rises symmetrically  XI: Head turning and shoulder shrug are intact.  XII: Tongue protrudes midline  Motor: Normal bulk and tone. Very mild pronator drift on the LUE. Strength bilateral upper extremity 5/5, bilateral lower extremities 5/5.  Sensation: Intact to light touch bilaterally. No neglect or extinction on double simultaneous testing.  Coordination: No difficulties on finger to nose on the R. Able to hit target on L finger to nose but exam fluctuate. Same findings on LE.     Labs:  CBC Full  -  ( 06 Feb 2024 05:36 )  WBC Count : 9.20 K/uL  RBC Count : 4.64 M/uL  Hemoglobin : 14.1 g/dL  Hematocrit : 41.8 %  Platelet Count - Automated : 241 K/uL  Mean Cell Volume : 90.1 fL  Mean Cell Hemoglobin : 30.4 pg  Mean Cell Hemoglobin Concentration : 33.7 g/dL  Auto Neutrophil # : 5.39 K/uL  Auto Lymphocyte # : 2.64 K/uL  Auto Monocyte # : 0.89 K/uL  Auto Eosinophil # : 0.17 K/uL  Auto Basophil # : 0.08 K/uL  Auto Neutrophil % : 58.6 %  Auto Lymphocyte % : 28.7 %  Auto Monocyte % : 9.7 %  Auto Eosinophil % : 1.8 %  Auto Basophil % : 0.9 %    02-06    138  |  102  |  25<H>  ----------------------------<  101<H>  3.9   |  23  |  0.8    Ca    9.9      06 Feb 2024 05:36  Mg     2.0     02-06          Urinalysis Basic - ( 06 Feb 2024 05:36 )    Color: x / Appearance: x / SG: x / pH: x  Gluc: 101 mg/dL / Ketone: x  / Bili: x / Urobili: x   Blood: x / Protein: x / Nitrite: x   Leuk Esterase: x / RBC: x / WBC x   Sq Epi: x / Non Sq Epi: x / Bacteria: x

## 2024-02-06 NOTE — PROGRESS NOTE ADULT - ASSESSMENT
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 likely due to small vessel dz vs A-A emboli    Acute L cerebellar infarct - likely due to small vessel dz vs A-A emboli  - MRI brain w/out: Acute infarcts involving the left cerebellar hemisphere without hemorrhagic transformation  - S/p Load Aspirin 325mg and Plavix 300mg  - C/w Aspirin 81mg and Plavix 75mg daily for 90 days  - c/w Atorvastatin to 80mg daily  - TTE pending  - HbA1c 5.8 , TSH 2.10 and   - Tele monitoring  - Physical therapy/Occupational therapy/Speech and Swallow/Physiatry eval  - Fall and Aspiration precautions  - Neurochecks q4 hrs  - Provide stroke education  - SBP goal: 110-150    #HLD  - c/w lipitor 80mg daily    #HTN  - on Carvedilol ER 20mg daily and Hctz 25mg daily, amlodipine  - resumed home meds  - advise against giving Coreg 25 BID as can lead to severe bradycardia (d/w neuro). Instead consider changing Norvasc to Procardia or adding ACE/ARB.    #L knee OA  - no NSAIDS for now  - Tylenol PRN for pain  - Warm/cold compresses as needed    #Nutrition/Fluids/Electrolytes   - replete K<4 and Mg <2  - ensure regular BMs  - consider Miralax BID, Senna    #DVT Px  SCDs  Heparin or Lovenox        Chart and notes personally reviewed.  Care Discussed with Consultants/Other Providers/ Housestaff [ x] YES [ ] NO   Radiology, labs, old records personally reviewed.    discussed w/ housestaff, nursing, case management, neuro team, daughter #2    Attestation Statements:    Attestation Statements:  Risk Statement (NON-critical care).     On this date of service, level of risk to patient is considered: High.     Due to: acute stroke, stroke unit care, neuro checks, telemetry monitoring,     Time-based billing (NON-critical care).     35 minutes spent on total encounter. The necessity of the time spent during the encounter on this date of service was due to:     time spent on review of labs, imaging studies, old records, obtaining history, personally examining patient, counselling and communicating with patient/ family, entering orders for medications/tests/etc, discussions with other health care providers, documentation in electronic health records, independent interpretation of labs, imaging/procedure results and care coordination.

## 2024-02-06 NOTE — PROGRESS NOTE ADULT - ATTENDING COMMENTS
84 y/o woman with hx of HTN, HLD who presented with gait ataxia 2/2 left cerebellar infarct likely due to small vessel disease vs possible atheroembolic from significant left vertebral artery disease. Continue ASA/Plavix and high-intensity statin. 2D ECHO unremarkable. Pending ILR placement.  Titrate BP meds for goal -150. PT/OT. Pending dispo to acute rehab. Will have patient follow-up outpatient to further assess left VA findings.
Recommendations as above.
Pending 2D ECHO; follow-up with cards for potential ILR placement. PT/OT. SW for dispo to acute rehab.

## 2024-02-06 NOTE — PROGRESS NOTE ADULT - PROVIDER SPECIALTY LIST ADULT
Neurology
Internal Medicine
Neurology
Physiatry
Hospitalist
Internal Medicine
Neurology
Neurology
Hospitalist

## 2024-02-07 ENCOUNTER — TRANSCRIPTION ENCOUNTER (OUTPATIENT)
Age: 84
End: 2024-02-07

## 2024-02-07 ENCOUNTER — INPATIENT (INPATIENT)
Facility: HOSPITAL | Age: 84
LOS: 22 days | Discharge: HOME | DRG: 40 | End: 2024-03-01
Attending: PHYSICAL MEDICINE & REHABILITATION | Admitting: PHYSICAL MEDICINE & REHABILITATION
Payer: MEDICARE

## 2024-02-07 VITALS
DIASTOLIC BLOOD PRESSURE: 65 MMHG | HEART RATE: 67 BPM | HEIGHT: 65 IN | RESPIRATION RATE: 20 BRPM | TEMPERATURE: 98 F | WEIGHT: 177.91 LBS | SYSTOLIC BLOOD PRESSURE: 150 MMHG

## 2024-02-07 VITALS
RESPIRATION RATE: 18 BRPM | OXYGEN SATURATION: 97 % | SYSTOLIC BLOOD PRESSURE: 135 MMHG | DIASTOLIC BLOOD PRESSURE: 59 MMHG | TEMPERATURE: 96 F | HEART RATE: 60 BPM

## 2024-02-07 DIAGNOSIS — Z96.641 PRESENCE OF RIGHT ARTIFICIAL HIP JOINT: Chronic | ICD-10-CM

## 2024-02-07 DIAGNOSIS — I63.9 CEREBRAL INFARCTION, UNSPECIFIED: ICD-10-CM

## 2024-02-07 PROBLEM — E78.5 HYPERLIPIDEMIA, UNSPECIFIED: Chronic | Status: ACTIVE | Noted: 2024-02-02

## 2024-02-07 PROBLEM — I10 ESSENTIAL (PRIMARY) HYPERTENSION: Chronic | Status: ACTIVE | Noted: 2024-02-02

## 2024-02-07 PROBLEM — M19.90 UNSPECIFIED OSTEOARTHRITIS, UNSPECIFIED SITE: Chronic | Status: ACTIVE | Noted: 2024-02-02

## 2024-02-07 LAB
ANION GAP SERPL CALC-SCNC: 12 MMOL/L — SIGNIFICANT CHANGE UP (ref 7–14)
BASOPHILS # BLD AUTO: 0.09 K/UL — SIGNIFICANT CHANGE UP (ref 0–0.2)
BASOPHILS NFR BLD AUTO: 1 % — SIGNIFICANT CHANGE UP (ref 0–1)
BUN SERPL-MCNC: 24 MG/DL — HIGH (ref 10–20)
CALCIUM SERPL-MCNC: 9.9 MG/DL — SIGNIFICANT CHANGE UP (ref 8.4–10.5)
CHLORIDE SERPL-SCNC: 102 MMOL/L — SIGNIFICANT CHANGE UP (ref 98–110)
CO2 SERPL-SCNC: 24 MMOL/L — SIGNIFICANT CHANGE UP (ref 17–32)
CREAT SERPL-MCNC: 0.7 MG/DL — SIGNIFICANT CHANGE UP (ref 0.7–1.5)
EGFR: 86 ML/MIN/1.73M2 — SIGNIFICANT CHANGE UP
EOSINOPHIL # BLD AUTO: 0.19 K/UL — SIGNIFICANT CHANGE UP (ref 0–0.7)
EOSINOPHIL NFR BLD AUTO: 2.1 % — SIGNIFICANT CHANGE UP (ref 0–8)
GLUCOSE SERPL-MCNC: 98 MG/DL — SIGNIFICANT CHANGE UP (ref 70–99)
HCT VFR BLD CALC: 39.3 % — SIGNIFICANT CHANGE UP (ref 37–47)
HGB BLD-MCNC: 13.4 G/DL — SIGNIFICANT CHANGE UP (ref 12–16)
IMM GRANULOCYTES NFR BLD AUTO: 0.4 % — HIGH (ref 0.1–0.3)
LYMPHOCYTES # BLD AUTO: 2.68 K/UL — SIGNIFICANT CHANGE UP (ref 1.2–3.4)
LYMPHOCYTES # BLD AUTO: 29.1 % — SIGNIFICANT CHANGE UP (ref 20.5–51.1)
MAGNESIUM SERPL-MCNC: 2 MG/DL — SIGNIFICANT CHANGE UP (ref 1.8–2.4)
MCHC RBC-ENTMCNC: 30.6 PG — SIGNIFICANT CHANGE UP (ref 27–31)
MCHC RBC-ENTMCNC: 34.1 G/DL — SIGNIFICANT CHANGE UP (ref 32–37)
MCV RBC AUTO: 89.7 FL — SIGNIFICANT CHANGE UP (ref 81–99)
MONOCYTES # BLD AUTO: 0.92 K/UL — HIGH (ref 0.1–0.6)
MONOCYTES NFR BLD AUTO: 10 % — HIGH (ref 1.7–9.3)
NEUTROPHILS # BLD AUTO: 5.3 K/UL — SIGNIFICANT CHANGE UP (ref 1.4–6.5)
NEUTROPHILS NFR BLD AUTO: 57.4 % — SIGNIFICANT CHANGE UP (ref 42.2–75.2)
NRBC # BLD: 0 /100 WBCS — SIGNIFICANT CHANGE UP (ref 0–0)
PLATELET # BLD AUTO: 241 K/UL — SIGNIFICANT CHANGE UP (ref 130–400)
PMV BLD: 10.4 FL — SIGNIFICANT CHANGE UP (ref 7.4–10.4)
POTASSIUM SERPL-MCNC: 3.6 MMOL/L — SIGNIFICANT CHANGE UP (ref 3.5–5)
POTASSIUM SERPL-SCNC: 3.6 MMOL/L — SIGNIFICANT CHANGE UP (ref 3.5–5)
RBC # BLD: 4.38 M/UL — SIGNIFICANT CHANGE UP (ref 4.2–5.4)
RBC # FLD: 13.3 % — SIGNIFICANT CHANGE UP (ref 11.5–14.5)
SODIUM SERPL-SCNC: 138 MMOL/L — SIGNIFICANT CHANGE UP (ref 135–146)
WBC # BLD: 9.22 K/UL — SIGNIFICANT CHANGE UP (ref 4.8–10.8)
WBC # FLD AUTO: 9.22 K/UL — SIGNIFICANT CHANGE UP (ref 4.8–10.8)

## 2024-02-07 PROCEDURE — 97535 SELF CARE MNGMENT TRAINING: CPT | Mod: GO

## 2024-02-07 PROCEDURE — 82962 GLUCOSE BLOOD TEST: CPT

## 2024-02-07 PROCEDURE — 36415 COLL VENOUS BLD VENIPUNCTURE: CPT

## 2024-02-07 PROCEDURE — 97116 GAIT TRAINING THERAPY: CPT | Mod: GP

## 2024-02-07 PROCEDURE — 92523 SPEECH SOUND LANG COMPREHEN: CPT | Mod: GN

## 2024-02-07 PROCEDURE — C1764: CPT

## 2024-02-07 PROCEDURE — 80048 BASIC METABOLIC PNL TOTAL CA: CPT

## 2024-02-07 PROCEDURE — 85025 COMPLETE CBC W/AUTO DIFF WBC: CPT

## 2024-02-07 PROCEDURE — 83735 ASSAY OF MAGNESIUM: CPT

## 2024-02-07 PROCEDURE — 97166 OT EVAL MOD COMPLEX 45 MIN: CPT | Mod: GO

## 2024-02-07 PROCEDURE — 96132 NRPSYC TST EVAL PHYS/QHP 1ST: CPT

## 2024-02-07 PROCEDURE — 90834 PSYTX W PT 45 MINUTES: CPT

## 2024-02-07 PROCEDURE — 90832 PSYTX W PT 30 MINUTES: CPT

## 2024-02-07 PROCEDURE — 97110 THERAPEUTIC EXERCISES: CPT | Mod: GP

## 2024-02-07 PROCEDURE — 80053 COMPREHEN METABOLIC PANEL: CPT

## 2024-02-07 PROCEDURE — 92507 TX SP LANG VOICE COMM INDIV: CPT | Mod: GN

## 2024-02-07 PROCEDURE — 87635 SARS-COV-2 COVID-19 AMP PRB: CPT

## 2024-02-07 PROCEDURE — 97112 NEUROMUSCULAR REEDUCATION: CPT | Mod: GO

## 2024-02-07 PROCEDURE — 97530 THERAPEUTIC ACTIVITIES: CPT | Mod: GP

## 2024-02-07 PROCEDURE — 97162 PT EVAL MOD COMPLEX 30 MIN: CPT | Mod: GP

## 2024-02-07 PROCEDURE — 0241U: CPT

## 2024-02-07 PROCEDURE — 33285 INSJ SUBQ CAR RHYTHM MNTR: CPT

## 2024-02-07 RX ORDER — SENNA PLUS 8.6 MG/1
2 TABLET ORAL AT BEDTIME
Refills: 0 | Status: DISCONTINUED | OUTPATIENT
Start: 2024-02-07 | End: 2024-02-11

## 2024-02-07 RX ORDER — ATORVASTATIN CALCIUM 80 MG/1
80 TABLET, FILM COATED ORAL AT BEDTIME
Refills: 0 | Status: DISCONTINUED | OUTPATIENT
Start: 2024-02-07 | End: 2024-03-02

## 2024-02-07 RX ORDER — LISINOPRIL 2.5 MG/1
5 TABLET ORAL AT BEDTIME
Refills: 0 | Status: DISCONTINUED | OUTPATIENT
Start: 2024-02-07 | End: 2024-03-02

## 2024-02-07 RX ORDER — ATORVASTATIN CALCIUM 80 MG/1
1 TABLET, FILM COATED ORAL
Qty: 0 | Refills: 0 | DISCHARGE
Start: 2024-02-07

## 2024-02-07 RX ORDER — CLOPIDOGREL BISULFATE 75 MG/1
75 TABLET, FILM COATED ORAL DAILY
Refills: 0 | Status: DISCONTINUED | OUTPATIENT
Start: 2024-02-07 | End: 2024-03-02

## 2024-02-07 RX ORDER — FAMOTIDINE 10 MG/ML
20 INJECTION INTRAVENOUS
Refills: 0 | Status: DISCONTINUED | OUTPATIENT
Start: 2024-02-07 | End: 2024-03-02

## 2024-02-07 RX ORDER — ATORVASTATIN CALCIUM 80 MG/1
1 TABLET, FILM COATED ORAL
Refills: 0 | DISCHARGE

## 2024-02-07 RX ORDER — LANOLIN ALCOHOL/MO/W.PET/CERES
3 CREAM (GRAM) TOPICAL AT BEDTIME
Refills: 0 | Status: DISCONTINUED | OUTPATIENT
Start: 2024-02-07 | End: 2024-03-02

## 2024-02-07 RX ORDER — ASPIRIN/CALCIUM CARB/MAGNESIUM 324 MG
81 TABLET ORAL DAILY
Refills: 0 | Status: DISCONTINUED | OUTPATIENT
Start: 2024-02-07 | End: 2024-03-02

## 2024-02-07 RX ORDER — POLYETHYLENE GLYCOL 3350 17 G/17G
17 POWDER, FOR SOLUTION ORAL DAILY
Refills: 0 | Status: DISCONTINUED | OUTPATIENT
Start: 2024-02-07 | End: 2024-02-11

## 2024-02-07 RX ORDER — ASPIRIN/CALCIUM CARB/MAGNESIUM 324 MG
1 TABLET ORAL
Qty: 0 | Refills: 0 | DISCHARGE
Start: 2024-02-07

## 2024-02-07 RX ORDER — CARVEDILOL PHOSPHATE 80 MG/1
12.5 CAPSULE, EXTENDED RELEASE ORAL EVERY 12 HOURS
Refills: 0 | Status: DISCONTINUED | OUTPATIENT
Start: 2024-02-07 | End: 2024-02-09

## 2024-02-07 RX ORDER — CLOPIDOGREL BISULFATE 75 MG/1
1 TABLET, FILM COATED ORAL
Qty: 0 | Refills: 0 | DISCHARGE
Start: 2024-02-07

## 2024-02-07 RX ORDER — AMLODIPINE BESYLATE 2.5 MG/1
10 TABLET ORAL DAILY
Refills: 0 | Status: DISCONTINUED | OUTPATIENT
Start: 2024-02-08 | End: 2024-02-09

## 2024-02-07 RX ORDER — LISINOPRIL 2.5 MG/1
1 TABLET ORAL
Qty: 0 | Refills: 0 | DISCHARGE
Start: 2024-02-07

## 2024-02-07 RX ORDER — ACETAMINOPHEN 500 MG
650 TABLET ORAL EVERY 6 HOURS
Refills: 0 | Status: DISCONTINUED | OUTPATIENT
Start: 2024-02-07 | End: 2024-03-02

## 2024-02-07 RX ORDER — ENOXAPARIN SODIUM 100 MG/ML
40 INJECTION SUBCUTANEOUS EVERY 24 HOURS
Refills: 0 | Status: DISCONTINUED | OUTPATIENT
Start: 2024-02-07 | End: 2024-03-02

## 2024-02-07 RX ADMIN — FAMOTIDINE 20 MILLIGRAM(S): 10 INJECTION INTRAVENOUS at 22:40

## 2024-02-07 RX ADMIN — CARVEDILOL PHOSPHATE 12.5 MILLIGRAM(S): 80 CAPSULE, EXTENDED RELEASE ORAL at 17:01

## 2024-02-07 RX ADMIN — CARVEDILOL PHOSPHATE 12.5 MILLIGRAM(S): 80 CAPSULE, EXTENDED RELEASE ORAL at 06:16

## 2024-02-07 RX ADMIN — POLYETHYLENE GLYCOL 3350 17 GRAM(S): 17 POWDER, FOR SOLUTION ORAL at 11:55

## 2024-02-07 RX ADMIN — ENOXAPARIN SODIUM 40 MILLIGRAM(S): 100 INJECTION SUBCUTANEOUS at 22:41

## 2024-02-07 RX ADMIN — LISINOPRIL 5 MILLIGRAM(S): 2.5 TABLET ORAL at 22:40

## 2024-02-07 RX ADMIN — ATORVASTATIN CALCIUM 80 MILLIGRAM(S): 80 TABLET, FILM COATED ORAL at 22:40

## 2024-02-07 RX ADMIN — FAMOTIDINE 20 MILLIGRAM(S): 10 INJECTION INTRAVENOUS at 17:01

## 2024-02-07 RX ADMIN — CLOPIDOGREL BISULFATE 75 MILLIGRAM(S): 75 TABLET, FILM COATED ORAL at 11:55

## 2024-02-07 RX ADMIN — Medication 10 MILLIGRAM(S): at 11:55

## 2024-02-07 RX ADMIN — AMLODIPINE BESYLATE 10 MILLIGRAM(S): 2.5 TABLET ORAL at 06:16

## 2024-02-07 RX ADMIN — FAMOTIDINE 20 MILLIGRAM(S): 10 INJECTION INTRAVENOUS at 06:16

## 2024-02-07 RX ADMIN — Medication 81 MILLIGRAM(S): at 11:55

## 2024-02-07 NOTE — H&P ADULT - ASSESSMENT
Assessment:  This is a 82 y/o RHD F with PMHx of HLD, HTN, L knee OA presenting for worsening unsteady gait. In the ED, vitals were significant for /72. CTH notable for L cerebellar subacute infarct. 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 NIHSS on admission was 3 for ataxia L sided and LLE. Patient was admitted to stroke unit for further work up. On 2/6, the patient's Bp was elevated requiring 15mg of hydralazine overnight. CTH was repeated for possible worsening dysmetria, end gaze nystagmus, and NIHSS increase from 0 to 2 (for upper and lower left extremity dysmetria), urgent CTH showed no intracranial hemorrhage or midline shift. Left cerebellar hypodense lesion consistent with evolving subacute infarct.    The patient was evaluated by the PM&R team once medically stable. The patient was found to have functional limitation in terms of muscle strength, endurance, physical mobility, and ability to carry out activities of daily living (self care, transfers, and ambulation). The patient was started on a course of bedside therapy, the pt is motivated and able to start 3 hours of therapy  daily for 6-7 days a week. With PT/OT, pt required Min A w transfers, Mod A w UB dressing, Mod A w LB dressing. Amb 15' using RW requiring Mod A.  The patient was deemed to be a good candidate for admission for acute inpatient rehab. The patient was admitted to acute inpatient rehab on 2/7/24.     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  likely due to small vessel dz vs A-A emboli  MRI brain w/out: Acute infarcts involving the left cerebellar hemisphere without hemorrhagic transformation  HbA1c 5.8 , TSH 2.10 and   TTE Mildly enlarged L atria. Normal EF  NIHSS+2= Ataxia in 2 limbs on admission to rehab  - S/p Load Aspirin 325mg and Plavix 300mg  - C/w Aspirin 81mg and Plavix 75mg daily for 90 days  - EP consulted. ILR to be placed likely on Monday  - HOB at 45 degrees, aspiration precautions  - PT/OT/ST    #HTN  #HLD  - SBP goal: 110-150  - c/w Carvedilol 20 mg extended release daily  - c/w Hctz 25mg daily  - c/w amlodipine 10mg once daily   - c/w lipitor 80mg daily  -c/w lisinopril 5 mg at bedtime     #L knee OA  - no NSAIDS for now  - Tylenol PRN for pain    -Pain control: Tylenol     -GI/Bowel Mgmt: Miralax, Senna    -Bladder management: N/A     -Skin:  No active issues at this time    -FEN : Monitor Na levels    - Diet:     Precautions / PROPHYLAXIS:      - Falls      - DVT prophylaxis: Lovenox       MEDICAL PROGNOSIS: GOOD            REHAB POTENTIAL: GOOD             ESTIMATED DISPOSITION: HOME WITH HOME CARE       [ x ]  The goals of the IRF admission were discussed with the patient and family member, who agreed             ELOS:  [  x   ] 7-14    [   ]  14-21    [    ]  Other    THERAPY ORDERS and INITIAL INDIVIDUALIZED PLAN OF CARE:  This initial individualized interdisciplinary plan of care, which was established by me (the attending physiatrist), is based on elements from the post admission evaluation. The interdisciplinary therapy program is to be at least 3 hrs a day, at least 5 days per week from from physical, occupational and/ or speech therapies as ordered by me below.      [ x  ] P.T. 90 mins. /day at least 5 out of 7 days:  [  x ] superficial  modalities prn, [ x  ] A/AAROM, [ x  ] PREs, [ x  ] transfer training,            [ x  ] progressive ambulation, [x   ] stairs                                               [ x  ] O.T. 90 mins. /day at least 5 out of 7 days::  [ x  ] modalities prn,  [ x  ]A/AAROM, [ x  ] PREs, [  x ] functional transfer training, [ x  ] ADL training           [ x  ] cognitive/ perceptual eval and training, [   ] splint eval                                                [ x  ] S.L.P:  [ x  ] speech eval, [ x  ] swallow eval     [ x  ] Neuropsychology eval     [ x  ] Individualized rec. therapy      RATIONALE FOR INPATIENT ADMISSION - Patient demonstrates the following: (check all that apply)  [X] Medically appropriate for acute rehabilitation admission. Requires interdisiplinary therapy consisting of at least PT and OT, at least 3 hrs. a day at least 5 days a week  [X] Has attainable rehab goals with an appropriate initial discharge plan  [X] Has rehabilitation potential (expected to make a significant improvement within a reasonable period of time)  [X] Requires close medical management by a rehab physician, rehab nursing care,  and comprehensive interdisciplinary team (including PT, OT)    [X] Requires evaluation by a physiatrist at least 3 days a week to evaluate and manage and coordinate rehab and medical problems   Assessment:  This is a 84 y/o RHD F with PMHx of HLD, HTN, L knee OA presenting for worsening unsteady gait. In the ED, vitals were significant for /72. CTH notable for L cerebellar subacute infarct. 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 NIHSS on admission was 3 for ataxia L sided and LLE. Patient was admitted to stroke unit for further work up. On 2/6, the patient's Bp was elevated requiring 15mg of hydralazine overnight. CTH was repeated for possible worsening dysmetria, end gaze nystagmus, and NIHSS increase from 0 to 2 (for upper and lower left extremity dysmetria), urgent CTH showed no intracranial hemorrhage or midline shift. Left cerebellar hypodense lesion consistent with evolving subacute infarct.    The patient was evaluated by the PM&R team once medically stable. The patient was found to have functional limitation in terms of muscle strength, endurance, physical mobility, and ability to carry out activities of daily living (self care, transfers, and ambulation). The patient was started on a course of bedside therapy, the pt is motivated and able to start 3 hours of therapy  daily for 6-7 days a week. With PT/OT, pt required Min A w transfers, Mod A w UB dressing, Mod A w LB dressing. Amb 15' using RW requiring Mod A.  SLP evaluated the patient and recommended regular with thin liquids.The patient was deemed to be a good candidate for admission for acute inpatient rehab. The patient was admitted to acute inpatient rehab on 2/7/24.     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  likely due to small vessel dz vs A-A emboli  MRI brain w/out: Acute infarcts involving the left cerebellar hemisphere without hemorrhagic transformation  HbA1c 5.8 , TSH 2.10 and   TTE Mildly enlarged L atria. Normal EF  NIHSS+2= Ataxia in 2 limbs on admission to rehab  - S/p Load Aspirin 325mg and Plavix 300mg  - C/w Aspirin 81mg and Plavix 75mg daily for 90 days  - EP consulted. ILR to be placed likely on Monday  - HOB at 45 degrees, aspiration precautions  - PT/OT/ST    #HTN  #HLD  - SBP goal: 110-150  - c/w Carvedilol 20 mg extended release daily  - c/w Hctz 25mg daily  - c/w amlodipine 10mg once daily   - c/w lipitor 80mg daily  -c/w lisinopril 5 mg at bedtime     #L knee OA  - no NSAIDS for now  - Tylenol PRN for pain  - Modalities     -Pain control: Tylenol     -GI/Bowel Mgmt: Miralax, Senna    -Bladder management: N/A     -Skin:  No active issues at this time    -FEN : Monitor Na levels    - Diet: DASH/TLC    Precautions / PROPHYLAXIS:      - Falls      - DVT prophylaxis: Lovenox       MEDICAL PROGNOSIS: GOOD            REHAB POTENTIAL: GOOD             ESTIMATED DISPOSITION: HOME WITH HOME CARE       [ x ]  The goals of the IRF admission were discussed with the patient and family member, who agreed             ELOS:  [  x   ] 7-14    [   ]  14-21    [    ]  Other    THERAPY ORDERS and INITIAL INDIVIDUALIZED PLAN OF CARE:  This initial individualized interdisciplinary plan of care, which was established by me (the attending physiatrist), is based on elements from the post admission evaluation. The interdisciplinary therapy program is to be at least 3 hrs a day, at least 5 days per week from from physical, occupational and/ or speech therapies as ordered by me below.      [ x  ] P.T. 90 mins. /day at least 5 out of 7 days:  [  x ] superficial  modalities prn, [ x  ] A/AAROM, [ x  ] PREs, [ x  ] transfer training,            [ x  ] progressive ambulation, [x   ] stairs                                               [ x  ] O.T. 90 mins. /day at least 5 out of 7 days::  [ x  ] modalities prn,  [ x  ]A/AAROM, [ x  ] PREs, [  x ] functional transfer training, [ x  ] ADL training           [ x  ] cognitive/ perceptual eval and training, [   ] splint eval                                                [ x  ] S.L.P:  [ x  ] speech eval, [ x  ] swallow eval     [ x  ] Neuropsychology eval     [ x  ] Individualized rec. therapy      RATIONALE FOR INPATIENT ADMISSION - Patient demonstrates the following: (check all that apply)  [X] Medically appropriate for acute rehabilitation admission. Requires interdisiplinary therapy consisting of at least PT and OT, at least 3 hrs. a day at least 5 days a week  [X] Has attainable rehab goals with an appropriate initial discharge plan  [X] Has rehabilitation potential (expected to make a significant improvement within a reasonable period of time)  [X] Requires close medical management by a rehab physician, rehab nursing care,  and comprehensive interdisciplinary team (including PT, OT)    [X] Requires evaluation by a physiatrist at least 3 days a week to evaluate and manage and coordinate rehab and medical problems   Assessment:  This is a 82 y/o RHD F with PMHx of HLD, HTN, L knee OA presenting for worsening unsteady gait. In the ED, vitals were significant for /72. CTH notable for L cerebellar subacute infarct. 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 NIHSS on admission was 3 for ataxia L sided and LLE. Patient was admitted to stroke unit for further work up. On 2/6, the patient's Bp was elevated requiring 15mg of hydralazine overnight. CTH was repeated for possible worsening dysmetria, end gaze nystagmus, and NIHSS increase from 0 to 2 (for upper and lower left extremity dysmetria), urgent CTH showed no intracranial hemorrhage or midline shift. Left cerebellar hypodense lesion consistent with evolving subacute infarct.    The patient was evaluated by the PM&R team once medically stable. The patient was found to have functional limitation in terms of muscle strength, endurance, physical mobility, and ability to carry out activities of daily living (self care, transfers, and ambulation). The patient was started on a course of bedside therapy, the pt is motivated and able to start 3 hours of therapy  daily for 6-7 days a week. With PT/OT, pt required Min A w transfers, Mod A w UB dressing, Mod A w LB dressing. Amb 15' using RW requiring Mod A.  SLP evaluated the patient and recommended regular with thin liquids.The patient was deemed to be a good candidate for admission for acute inpatient rehab. The patient was admitted to acute inpatient rehab on 2/7/24.     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  likely due to small vessel dz vs A-A emboli  MRI brain w/out: Acute infarcts involving the left cerebellar hemisphere without hemorrhagic transformation  HbA1c 5.8 , TSH 2.10 and   TTE Mildly enlarged L atria. Normal EF  NIHSS+2= Ataxia in 2 limbs on admission to rehab  - S/p Load Aspirin 325mg and Plavix 300mg  - C/w Aspirin 81mg and Plavix 75mg daily for 90 days  - EP consulted. ILR to be placed likely on Monday  - HOB at 45 degrees, aspiration precautions  - PT/OT/ST    #HTN  - SBP goal: 110-150  - c/w Carvedilol 20 mg extended release daily  - c/w Hctz 25mg daily  - c/w amlodipine 10mg once daily   -c/w lisinopril 5 mg at bedtime   -Will monitor and adjust accordingly     #HLD  - c/w lipitor 80mg daily    #L knee OA  - no NSAIDS for now  - Tylenol PRN for pain  - Modalities     -Pain control: Tylenol     -GI/Bowel Mgmt: Miralax, Senna    -Bladder management: N/A     -Skin:  No active issues at this time    -FEN : Monitor Na levels    - Diet: DASH/TLC    Precautions / PROPHYLAXIS:      - Falls      - DVT prophylaxis: Lovenox       MEDICAL PROGNOSIS: GOOD            REHAB POTENTIAL: GOOD             ESTIMATED DISPOSITION: HOME WITH HOME CARE       [ x ]  The goals of the IRF admission were discussed with the patient and family member, who agreed             ELOS:  [  x   ] 7-14    [   ]  14-21    [    ]  Other    THERAPY ORDERS and INITIAL INDIVIDUALIZED PLAN OF CARE:  This initial individualized interdisciplinary plan of care, which was established by me (the attending physiatrist), is based on elements from the post admission evaluation. The interdisciplinary therapy program is to be at least 3 hrs a day, at least 5 days per week from from physical, occupational and/ or speech therapies as ordered by me below.      [ x  ] P.T. 90 mins. /day at least 5 out of 7 days:  [  x ] superficial  modalities prn, [ x  ] A/AAROM, [ x  ] PREs, [ x  ] transfer training,            [ x  ] progressive ambulation, [x   ] stairs                                               [ x  ] O.T. 90 mins. /day at least 5 out of 7 days::  [ x  ] modalities prn,  [ x  ]A/AAROM, [ x  ] PREs, [  x ] functional transfer training, [ x  ] ADL training           [ x  ] cognitive/ perceptual eval and training, [   ] splint eval                                                [ x  ] S.L.P:  [ x  ] speech eval, [ x  ] swallow eval     [ x  ] Neuropsychology eval     [ x  ] Individualized rec. therapy      RATIONALE FOR INPATIENT ADMISSION - Patient demonstrates the following: (check all that apply)  [X] Medically appropriate for acute rehabilitation admission. Requires interdisiplinary therapy consisting of at least PT and OT, at least 3 hrs. a day at least 5 days a week  [X] Has attainable rehab goals with an appropriate initial discharge plan  [X] Has rehabilitation potential (expected to make a significant improvement within a reasonable period of time)  [X] Requires close medical management by a rehab physician, rehab nursing care,  and comprehensive interdisciplinary team (including PT, OT)    [X] Requires evaluation by a physiatrist at least 3 days a week to evaluate and manage and coordinate rehab and medical problems   Assessment:  This is a 84 y/o RHD F with PMHx of HLD, HTN, L knee OA presenting for worsening unsteady gait. In the ED, vitals were significant for /72. CTH notable for L cerebellar subacute infarct. 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 NIHSS on admission was 3 for ataxia L sided and LLE. Patient was admitted to stroke unit for further work up. On 2/6, the patient's Bp was elevated requiring 15mg of hydralazine overnight. CTH was repeated for possible worsening dysmetria, end gaze nystagmus, and NIHSS increase from 0 to 2 (for upper and lower left extremity dysmetria), urgent CTH showed no intracranial hemorrhage or midline shift. Left cerebellar hypodense lesion consistent with evolving subacute infarct.    The patient was evaluated by the PM&R team once medically stable. The patient was found to have functional limitation in terms of muscle strength, endurance, physical mobility, and ability to carry out activities of daily living (self care, transfers, and ambulation). The patient was started on a course of bedside therapy, the pt is motivated and able to start 3 hours of therapy  daily for 6-7 days a week. With PT/OT, pt required Min A w transfers, Mod A w UB dressing, Mod A w LB dressing. Amb 15' using RW requiring Mod A.  SLP evaluated the patient and recommended regular with thin liquids.The patient was deemed to be a good candidate for admission for acute inpatient rehab. The patient was admitted to acute inpatient rehab on 2/7/24.     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 Normal EF  NIHSS+2= Ataxia in 2 limbs on admission to rehab  - S/p Loaded Aspirin 325mg and Plavix 300mg  - C/w Aspirin 81mg and Plavix 75mg daily for 90 days  - EP consulted. ILR to be placed likely on Monday  - HOB at 45 degrees, aspiration precautions  - PT/OT/ST/ neuropsych eval    #HTN  - SBP goal: 110-150  - c/w Carvedilol 20 mg extended release daily  - c/w Hctz 25mg daily  - c/w amlodipine 10mg once daily   -c/w lisinopril 5 mg at bedtime   -Will monitor and adjust accordingly     #HLD  - c/w lipitor 80mg daily    #Bilat knee OA/ left knee chronic pain  - no NSAIDS for now  - Tylenol PRN for pain  - Modalities   - consider Voltaren cream and/ or Lidoderm patch and Tylenol      #GI/Bowel Mgmt:/ Constipation  - finally had BM today with relief  - monitor on Miralax, Senna      -FEN : Monitor Na levels    - Diet: DASH/TLC    Precautions / PROPHYLAXIS:      - Falls      - DVT prophylaxis: Lovenox       MEDICAL PROGNOSIS: GOOD            REHAB POTENTIAL: GOOD             ESTIMATED DISPOSITION: HOME WITH HOME CARE       [ x ]  The goals of the IRF admission were discussed with the patient, who agreed             ELOS:  [  x   ] 7-14    [   ]  14-21    [    ]  Other    THERAPY ORDERS and INITIAL INDIVIDUALIZED PLAN OF CARE:  This initial individualized interdisciplinary plan of care, which was established by me (the attending physiatrist), is based on elements from the post admission evaluation. The interdisciplinary therapy program is to be at least 3 hrs a day, at least 5 days per week from from physical, occupational and/ or speech therapies as ordered by me below.      [ x  ] P.T. 90 mins. /day at least 5 out of 7 days:  [  x ] superficial  modalities prn, [ x  ] A/AAROM, [ x  ] PREs, [ x  ] transfer training,            [ x  ] progressive ambulation, [x   ] stairs                                               [ x  ] O.T. 90 mins. /day at least 5 out of 7 days::  [ x  ] modalities prn,  [ x  ]A/AAROM, [ x  ] PREs, [  x ] functional transfer training, [ x  ] ADL training           [ x  ] cognitive/ perceptual eval and training, [   ] splint eval                                                [ x  ] S.L.P:  [ x  ] speech eval, [ x  ] swallow eval     [ x  ] Neuropsychology eval     [ x  ] Individualized rec. therapy      RATIONALE FOR INPATIENT ADMISSION - Patient demonstrates the following: (check all that apply)  [X] Medically appropriate for acute rehabilitation admission. Requires interdisiplinary therapy consisting of at least PT and OT, at least 3 hrs. a day at least 5 days a week  [X] Has attainable rehab goals with an appropriate initial discharge plan  [X] Has rehabilitation potential (expected to make a significant improvement within a reasonable period of time)  [X] Requires close medical management by a rehab physician, rehab nursing care,  and comprehensive interdisciplinary team (including PT, OT)    [X] Requires evaluation by a physiatrist at least 3 days a week to evaluate and manage and coordinate rehab and medical problems

## 2024-02-07 NOTE — DISCHARGE NOTE PROVIDER - NSDCFUSCHEDAPPT_GEN_ALL_CORE_FT
Junaid Erickson  [facility]  [address]  Scheduled Appointment: 04/01/2024

## 2024-02-07 NOTE — DISCHARGE NOTE PROVIDER - NSDCCPCAREPLAN_GEN_ALL_CORE_FT
PRINCIPAL DISCHARGE DIAGNOSIS  Diagnosis: Posterior circulation stroke  Assessment and Plan of Treatment: You have been diagnosed with stroke which is a condition that develops when part of the brain doesn't get enough blood and oxygen   - Take your blood thinners and cholesterol medication as prescribed. Do not skip doses and do not run low on your medication. call your doctor if you need refills   - If you have diabetes, take your diabetes medication as prescribed. Check your blood sugar level every day and contact your doctor if the levels arr high as your medication may need to be re-adjusted or some medication may need to be added   - If you have hypertension, take your blood pressure medication as prescribed. Measure your blood pressure with a cuff every day and write down the values. Call your doctors if the values are high despite medication as he may adjust your medication   - Avoid smoking and do not let anyone smoke next to you. If you are a smoker and find it hard to smoke seek help or call your doctors for referrals for counselling programs   - Follow up with your doctor as outpatient. he may prescribe regular lab work to keep track of your cholesterol and sugar levels in your blood. Do not skip appointments and always be compliant wit your doctor's advise  - Call 911 or report to the emergency department if you have sudden onset severe headache, vision problems such as blurry or double vision or vision that is going dark, vertigo, numbness or weakness anywhere in your body, slurred speech or difficulty walking

## 2024-02-07 NOTE — DISCHARGE NOTE NURSING/CASE MANAGEMENT/SOCIAL WORK - PATIENT PORTAL LINK FT
You can access the FollowMyHealth Patient Portal offered by [facility] by registering at the following website: [email or web address]. By joining [employer]’s FollowMyHealth portal, you will also be able to view your health information using other applications (apps) compatible with our system.

## 2024-02-07 NOTE — DISCHARGE NOTE NURSING/CASE MANAGEMENT/SOCIAL WORK - NSDCPEFALRISK_GEN_ALL_CORE
For information on Fall & Injury Prevention, visit: [email or web address] OR  [email or web address] OR  https://www.cdc.gov/steadi/patient.html

## 2024-02-07 NOTE — H&P ADULT - REASON FOR ADMISSION
Rehab of left cerebellar stroke with left hemiataxia. Rehab of left cerebellar stroke with left hemiataxia.(nondominant)

## 2024-02-07 NOTE — H&P ADULT - ATTENDING COMMENTS
I reviewed the chart and examined the patient with the resident and we discussed the findings and treatment plan.  The patient is tolerating the bedside rehab program well. I agree with the findings and treatment plan above, which I modified as indicated. The patient requires 3 hrs a day of acute inpatient rehab. Patient with OA and HTN admitted with 1 day of new onset impaired balance and gait and  left side coordination. Found to have acute left Cerebellar infarct. Some worsening in hospital with no change on imaging. Treated for uncontrlled HTN. Cleared for regular consistency diet. Now stable and tolerating bedside therapies well. Requires mod assist to transfer, min assist to ambulate with RW and mod assist for dressing. and was fully independent in the community and driving PTA. The patient requires acute interdisciplinary rehab including PT and OT and ST abd neuropsych. evals to maximize function for safe d/c home in a reasonable time. The patient can tolerate and benefit from 3 hrs daily therapy and requires Physiatry f/u at least 3 days a week to monitor/ manage her fluctuating neuro symptoms, treat her arthritic pain and monitor her recent fluctuating BP. She is motivalte and has good potential to return home in about 2 weeks with intensive therapy.    I read, edited and agree with the Assessment::  Plan:  #Rehab of left cerebellar stroke with left hemiataxia.(nondominanat), and impaired cognition/ memory  likely due to small vessel dz vs A-A emboli  MRI brain w/out: Acute infarcts involving the left cerebellar hemisphere without hemorrhagic transformation  HbA1c 5.8 , TSH 2.10 and   TTE Mildly enlarged L atria. Normal EF  NIHSS+2= Ataxia in 2 limbs on admission to rehab  - S/p Loaded Aspirin 325mg and Plavix 300mg  - C/w Aspirin 81mg and Plavix 75mg daily for 90 days  - EP consulted. ILR to be placed likely on Monday  - HOB at 45 degrees, aspiration precautions  - PT/OT/ST/ neuropsych eval    #HTN  - SBP goal: 110-150  - c/w Carvedilol 20 mg extended release daily  - c/w Hctz 25mg daily  - c/w amlodipine 10mg once daily   -c/w lisinopril 5 mg at bedtime   -Will monitor and adjust accordingly     #HLD  - c/w lipitor 80mg daily    #Bilat knee OA/ left knee chronic pain  - no NSAIDS for now  - Tylenol PRN for pain  - Modalities   - consider Voltaren cream and/ or Lidoderm patch and Tylenol      #GI/Bowel Mgmt:/ Constipation  - finally had BM today with relief  - monitor on Miralax, Senna

## 2024-02-07 NOTE — H&P ADULT - NSHPREVIEWOFSYSTEMS_GEN_ALL_CORE
Constitutional:    [x ] WNL           [   ] poor appetite   [   ] insomnia   [ x ] tired   Cardio:                [ x ] WNL           [   ] CP   [  ] BARBOSA   [   ] palpitations               Resp:                   [  x ] WNL           [   ] SOB   [   ] cough   [   ] wheezing   GI:                        [ x  ] WNL           [   ] constipation  [   ] diarrhea   [   ] abdominal pain   [   ] nausea   [   ] emesis                                :                      [   ] WNL           [   ] FRANCES  [   ] dysuria   [   ] difficulty voiding   [x]: Other: PureWick   Endo:                   [ x  ] WNL          [   ] polyuria   [   ] temperature intolerance                 Skin:                     [ x  ] WNL          [   ] pain   [   ] wound  [   ] rash   MSK:                    [ x ] WNL          [  ] muscle pain   [   ] joint pain/ stiffness   [   ] muscle tenderness   [   ] swelling   Neuro:                 [   ] WNL          [   ] HA   [   ] change in vision   [   ] tremor   [x  ] weakness-LSW    [ ]dysphagia   [  ]            Cognitive:           [ x ] WNL           [  ]confusion      Psych:                  [  x ] WNL           [   ] hallucinations   [   ]agitation   [   ] delusion   [   ]depression Constitutional:    [x ] WNL           [   ] poor appetite   [   ] insomnia   [ x ] tired   Cardio:                [ x ] WNL           [   ] CP   [  ] BARBOSA   [   ] palpitations               Resp:                   [  x ] WNL           [   ] SOB   [   ] cough   [   ] wheezing   GI:                        [ x  ] WNL           [   ] constipation  [   ] diarrhea   [   ] abdominal pain   [   ] nausea   [   ] emesis                                :                      [   ] WNL           [   ] FRANCES  [   ] dysuria   [   ] difficulty voiding   [x]: Other: PureWick   Endo:                   [ x  ] WNL          [   ] polyuria   [   ] temperature intolerance                 Skin:                     [ x  ] WNL          [   ] pain   [   ] wound  [   ] rash   MSK:                    [  ] WNL          [  ] muscle pain   [ x ] joint pain/ stiffness-b/l knee pain L>R   [   ] muscle tenderness   [   ] swelling   Neuro:                 [   ] WNL          [   ] HA   [   ] change in vision   [   ] tremor   [x  ] LSW weakness  [ ]dysphagia   [  ]            Cognitive:           [ x ] WNL           [  ]confusion      Psych:                  [  x ] WNL           [   ] hallucinations   [   ]agitation   [   ] delusion   [   ]depression Constitutional:    [x ] WNL           [   ] poor appetite   [   ] insomnia   [ x ] tired   Cardio:                [ x ] WNL           [   ] CP   [  ] BARBOSA   [   ] palpitations               Resp:                   [  x ] WNL           [   ] SOB   [   ] cough   [   ] wheezing   GI:                        [ x  ] WNL           [   ] constipation  [   ] diarrhea   [   ] abdominal pain   [   ] nausea   [   ] emesis                                :                      [   ] WNL           [   ] FRANCES  [   ] dysuria   [   ] difficulty voiding   [x]: Other: PureWick   Endo:                   [ x  ] WNL          [   ] polyuria   [   ] temperature intolerance                 Skin:                     [ x  ] WNL          [   ] pain   [   ] wound  [   ] rash   MSK:                    [  ] WNL          [  ] muscle pain   [ x ] joint pain/ stiffness-b/l knee pain L>R   [   ] muscle tenderness   [   ] swelling   Neuro:                 [   ] WNL          [   ] HA   [   ] change in vision   [   ] tremor   [x  ] LSW weakness  [   ]dysphagia            Cognitive:           [ x ] WNL           [  ]confusion      Psych:                  [  x ] WNL           [   ] hallucinations   [   ]agitation   [   ] delusion   [   ]depression

## 2024-02-07 NOTE — H&P ADULT - NSHPLABSRESULTS_GEN_ALL_CORE
13.4   9.22  )-----------( 241      ( 07 Feb 2024 06:01 )             39.3     02-07    138  |  102  |  24<H>  ----------------------------<  98  3.6   |  24  |  0.7    Ca    9.9      07 Feb 2024 06:01  Mg     2.0     02-07      Urinalysis Basic - ( 07 Feb 2024 06:01 )    Color: x / Appearance: x / SG: x / pH: x  Gluc: 98 mg/dL / Ketone: x  / Bili: x / Urobili: x   Blood: x / Protein: x / Nitrite: x   Leuk Esterase: x / RBC: x / WBC x   Sq Epi: x / Non Sq Epi: x / Bacteria: x      Imaging:  MR Head No Cont (02.02.24 @ 20:18)   Acute infarcts involving the left cerebellar hemisphere without hemorrhagic transformation.    CT Angio Head w/ IV Cont (02.02.24 @ 14:27)   Focal hypodensity is noted involving the left superior cerebellar hemisphere likely reflecting acute or subacute ischemic change.    CTA HEAD:  -Occlusion is noted of the distal left superior cerebellar artery.  -Intracranial vessels are otherwise unremarkable.    CTA NECK:  -Severe stenosis at the origin of the left vertebral artery due to atherosclerotic calcification.  -Moderate stenosis of the proximal right subclavian artery due to mixed calcified and noncalcified atherosclerotic plaque.    12 Lead ECG (02.02.24 @ 13:08)   Diagnosis Line Sinus rhythm [id number] degree A-V block  Right bundle branch block  Left posterior fascicular block  *** Bifascicular block *** 13.4   9.22  )-----------( 241      ( 07 Feb 2024 06:01 )             39.3     02-07    138  |  102  |  24<H>  ----------------------------<  98  3.6   |  24  |  0.7    Ca    9.9      07 Feb 2024 06:01  Mg     2.0     02-07        Imaging:  MR Head No Cont (02.02.24 @ 20:18)   Acute infarcts involving the left cerebellar hemisphere without hemorrhagic transformation.    CT Angio Head w/ IV Cont (02.02.24 @ 14:27)   Focal hypodensity is noted involving the left superior cerebellar hemisphere likely reflecting acute or subacute ischemic change.    CTA HEAD:  -Occlusion is noted of the distal left superior cerebellar artery.  -Intracranial vessels are otherwise unremarkable.    CTA NECK:  -Severe stenosis at the origin of the left vertebral artery due to atherosclerotic calcification.  -Moderate stenosis of the proximal right subclavian artery due to mixed calcified and noncalcified atherosclerotic plaque.    < from: TTE Echo Complete w/o Contrast w/ Doppler (02.06.24 @ 08:05) >     1. Hyperdynamic global left ventricular systolic function with a   non-hemodynamically significant mid cavitary gradient of <5mmHg. Left   ventricular ejection fraction, by visual estimation, is 70 to 75%. Mild   (Grade I) diastolic dysfunction. No regional wall motion abnormalities.   2. Normal right ventricular size and function.   3. Mildly enlarged left atrium.   4. Mildly sclerotic aortic valve with normal opening.   5. No echocardiographic evidence of pulmonary hypertension.   6. There is no evidence of pericardial effusion.    < end of copied text >      12 Lead ECG (02.02.24 @ 13:08)   Diagnosis Line Sinus rhythm [id number] degree A-V block  Right bundle branch block  Left posterior fascicular block  *** Bifascicular block ***

## 2024-02-07 NOTE — H&P ADULT - HISTORY OF PRESENT ILLNESS
This is a 82 y/o RHD F with PMHx of HLD, HTN, L knee OA presenting for unsteady gait.    Patient states she was in her normal state of health a day prior to presentation. She was outside having lunch with her friend when she started to notice she was off balance that persists after coming back home. For worsening unsteady gait and imbalance she decided  to seek medical help the next day. She denies any HA, diplopia, N/V, dizziness, numbness/tingling, speech difficulties. In the ED, vitals were significant for /72. CTH notable for L cerebellar subacute infarct. 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 NIHSS on admission was 3 for ataxia L sided and LLE. Patient was admitted to stroke unit for further work up.     On 2/6, the patient's Bp was elevated requiring 15mg of hydralazine overnight. CTH was repeated for possible worsening dysmetria, end gaze nystagmus, and NIHSS increase from 0 to 2 (for upper and lower left extremity dysmetria), urgent CTH showed no intracranial hemorrhage or midline shift. Left cerebellar hypodense lesion consistent with evolving subacute infarct.    The patient was evaluated by the PM&R team once medically stable. The patient was found to have functional limitation in terms of muscle strength, endurance, physical mobility, and ability to carry out activities of daily living (self care, transfers, and ambulation). The patient was started on a course of bedside therapy, the pt is motivated and able to start 3 hours of therapy  daily for 6-7 days a week. With PT/OT, pt required Min A w transfers, Mod A w UB dressing, Mod A w LB dressing. Amb 15' using RW requiring Mod A.  The patient was deemed to be a good candidate for admission for acute inpatient rehab. The patient was admitted to acute inpatient rehab on 2/7/24.    This is a 82 y/o RHD F with PMHx of HLD, HTN, L knee OA presenting for unsteady gait.    Patient states she was in her normal state of health a day prior to presentation. She was outside having lunch with her friend when she started to notice she was off balance that persists after coming back home. For worsening unsteady gait and imbalance she decided  to seek medical help the next day. She denies any HA, diplopia, N/V, dizziness, numbness/tingling, speech difficulties. In the ED, vitals were significant for /72. CTH notable for L cerebellar subacute infarct. 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 NIHSS on admission was 3 for ataxia L sided and LLE. Patient was admitted to stroke unit for further work up.     On 2/6, the patient's Bp was elevated requiring 15mg of hydralazine overnight. CTH was repeated for possible worsening dysmetria, end gaze nystagmus, and NIHSS increase from 0 to 2 (for upper and lower left extremity dysmetria), urgent CTH showed no intracranial hemorrhage or midline shift. Left cerebellar hypodense lesion consistent with evolving subacute infarct.    The patient was evaluated by the PM&R team once medically stable. The patient was found to have functional limitation in terms of muscle strength, endurance, physical mobility, and ability to carry out activities of daily living (self care, transfers, and ambulation). The patient was started on a course of bedside therapy, the pt is motivated and able to start 3 hours of therapy  daily for 6-7 days a week. With PT/OT, pt required Min A w transfers, Mod A w UB dressing, Mod A w LB dressing. Amb 15' using RW requiring Mod A. SLP evaluated the patient and recommended regular with thin liquids. The patient was deemed to be a good candidate for admission for acute inpatient rehab. The patient was admitted to acute inpatient rehab on 2/7/24.    This is a 84 y/o RHD F with PMHx of HLD, HTN, L knee OA presenting for unsteady gait.    Patient states she was in her normal state of health a day prior to presentation. She was outside having lunch with her friend when she started to notice she was off balance that persists after coming back home. For worsening unsteady gait and imbalance she decided  to seek medical help the next day. She admits to vomiting, but denies any HA, diplopia, N, dizziness, numbness/tingling, speech difficulties. In the ED, vitals were significant for /72. CTH notable for L cerebellar subacute infarct. 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 NIHSS on admission was 3. Patient was admitted to stroke unit for further work up.     On 2/6, the patient's Bp was elevated requiring 15mg of hydralazine overnight. CTH was repeated for possible worsening dysmetria, end gaze nystagmus, and NIHSS increase from 0 to 2 (for upper and lower left extremity dysmetria), urgent CTH showed no intracranial hemorrhage or midline shift. Left cerebellar hypodense lesion consistent with evolving subacute infarct.    The patient was evaluated by the PM&R team once medically stable. The patient was found to have functional limitation in terms of muscle strength, endurance, physical mobility, and ability to carry out activities of daily living (self care, transfers, and ambulation). The patient was started on a course of bedside therapy, the pt is motivated and able to start 3 hours of therapy  daily for 6-7 days a week. With PT/OT, pt required Min A w transfers, Mod A w UB dressing, Mod A w LB dressing. Amb 15' using RW requiring Mod A. SLP evaluated the patient and recommended regular with thin liquids. The patient was deemed to be a good candidate for admission for acute inpatient rehab. The patient was admitted to acute inpatient rehab on 2/7/24.    This is a 82 y/o RHD F with PMHx of HLD, HTN, L knee OA presenting for unsteady gait.    Patient states she was in her normal state of health a day prior to presentation. She was outside having lunch with her friend when she started to notice she was off balance that persists after coming back home. For worsening unsteady gait and imbalance she decided  to seek medical help the next day. She admits to vomiting, but denies any HA, diplopia, N, dizziness, numbness/tingling, speech difficulties. In the ED, vitals were significant for /72. CTH notable for L cerebellar subacute infarct. 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 NIHSS on admission was 3. Patient was admitted to stroke unit for further work up.     On 2/6, the patient's Bp was elevated requiring 15mg of hydralazine overnight. CTH was repeated for possible worsening dysmetria, end gaze nystagmus, and NIHSS increase from 0 to 2 (for upper and lower left extremity dysmetria), urgent CTH showed no intracranial hemorrhage or midline shift. Left cerebellar hypodense lesion consistent with evolving subacute infarct.    The patient was evaluated by the PM&R team once medically stable. The patient was found to have functional limitation in terms of muscle strength, endurance, physical mobility, and ability to carry out activities of daily living (self care, transfers, and ambulation). The patient was started on a course of bedside therapy, the pt is motivated and able to start 3 hours of therapy  daily for 6-7 days a week. With PT/OT,  pt required Min A w transfers, Mod A w UB dressing, Mod A w LB dressing. Amb 15' using RW requiring Mod A. SLP evaluated the patient and recommended regular with thin liquids. The patient was deemed to be a good candidate for admission for acute inpatient rehab. The patient was admitted to acute inpatient rehab on 2/7/24.

## 2024-02-07 NOTE — H&P ADULT - NSHPSOCIALHISTORY_GEN_ALL_CORE
LS: Lives with daughter in a  with 5 [place] +HR, + 1 flight ~10 [place], to main level with +bathtub  Social history: Quit tobacco smoking > 40 years ago, drinks 1 glass of wine on the weekends, denies any drug use  PLOF: Independent with ADLs, uses a S.C for ambulation at times. +driving LS: Lives with daughter in a  with 5 [address] +HR, + 1 flight ~10 [place], to main level with +bathtub  Social history: Quit tobacco smoking > 40 years ago, drinks 1 glass of wine on the weekends, denies any illicit drug use  PLOF: Independent with ADLs, uses a S.C for ambulation at times. +driving LS: Lives with daughter in a  with 5 [address] +HR, + 1 flight ~10 [place], to main level with +bathtub  Social history: Quit tobacco smoking > 40 years ago, drinks 1 glass of wine on the weekends, denies any illicit drug use  PLOF: Independent with ADLs, uses a S.C for ambulation at times secondary to arthritic LE pain. +driving

## 2024-02-07 NOTE — DISCHARGE NOTE PROVIDER - NSDCMRMEDTOKEN_GEN_ALL_CORE_FT
atorvastatin 20 mg oral tablet: 1 tab(s) orally once a day  carvedilol 20 mg oral capsule, extended release: 1 cap(s) orally once a day  hydroCHLOROthiazide 25 mg oral tablet: 1 tab(s) orally once a day  Norvasc 10 mg oral tablet: 1 tab(s) orally once a day   aspirin 81 mg oral delayed release tablet: 1 tab(s) orally once a day  atorvastatin 80 mg oral tablet: 1 tab(s) orally once a day (at bedtime)  carvedilol 20 mg oral capsule, extended release: 1 cap(s) orally once a day  clopidogrel 75 mg oral tablet: 1 tab(s) orally once a day to be taken until 2/28  hydroCHLOROthiazide 25 mg oral tablet: 1 tab(s) orally once a day  lisinopril 5 mg oral tablet: 1 tab(s) orally once a day (at bedtime)  Norvasc 10 mg oral tablet: 1 tab(s) orally once a day

## 2024-02-07 NOTE — DISCHARGE NOTE PROVIDER - ATTENDING DISCHARGE PHYSICAL EXAMINATION:
Mild LLE weakness with gait instability. Will have patient follow-up with me as outpatient for CTA findings. Continue ASA/Plavix. ILR as outpatient with cards. Stable for discharge to acute reheab.

## 2024-02-07 NOTE — H&P ADULT - NSHPPHYSICALEXAM_GEN_ALL_CORE
General:[ x  ] NAD, Resting Comfortable   [   ] other:                                HEENT: [ x  ] NC/AT, EOMI,  Normal Conjunctivae,   [   ] other:   Cardio: [  x ] RRR, no murmur,   [   ] other:                              Pulm: [ x  ] No Respiratory Distress,  Lungs CTAB,   [   ] other:             Abdomen: [ x  ]ND/NT, Soft,   [   ] other:    : [   ] NO FRANCES CATHETER, [   ] FRANCES CATHETER present [ x ] other: PureWick  MSK: [ x ] No joint swelling, Full ROM   [   ] other:                                         Ext: [ ]No C/C/E, No calf tenderness,   [x  ]other:  b/l LE pitting edema   Skin: [ x  ]intact,   [   ] other: wound                                               Neurological Examination:  Cognitive: [  x  ] AAO x 3,   [  ]  other:     Attention:  [ x  ] intact,   [    ]  other:   Language: [  ] intact  [ x ] Able to name 3 objects and describe function          Memory: [   ] intact,    [ x ]  other:   Able to recall 1/3 objects   Mood/Affect: [   ] wnl  [  x  ]  other: emotional labile (crying)                                                                          Communication: [ x  ]Fluent, no dysarthria [ ] other:   CN II - XII:  [    ] intact,  [  x  ] other: horizontal  nystagmus noted  with fixation                                                                                        Motor:   RUE: 5/5: shoulder abduction, elbow flexion, elbow extension,  finger flexion  LUE: 4+/5: shoulder abduction, 4+/5 elbow flexion, elbow extension, 4+/5 finger flexion  RLE: 5/5 hip flexion, Knee ext/flex, dorsiflexion, and plantarflexion;   LLE: 4+/5: hip flexion, knee ext/flex, dorsiflexion, plantarflexion     Tone: [  x ] wnl,   [    ]  other:  DTRs: [ x ]symmetric, [   ] other:  Coordination:   [    ] intact,   [ x ] other: +FTN, heel to shin on the L                                                                      Sensory: [ x   ] Intact to light touch,   [ x ] other:  No clonus; No spasticity General:[ x  ] NAD, Resting Comfortable   [   ] other:                                HEENT: [ x  ] NC/AT, EOMI,  Normal Conjunctivae,   [   ] other:   Cardio: [  x ] RRR, no murmur,   [   ] other:                              Pulm: [ x  ] No Respiratory Distress,  Lungs CTAB,   [   ] other:             Abdomen: [ x  ]ND/NT, Soft,   [   ] other:    : [   ] NO FRANCES CATHETER, [   ] FRANCES CATHETER present [ x ] other: PureWick  MSK: [ x ] No joint swelling, Full ROM   [   ] other:                                         Ext: [x ]No C/C/E, No calf tenderness,   [ ]other:    Skin: [ x  ]intact,   [   ] other: wound                                               Neurological Examination:  Cognitive: [  x  ] AAO x 3,   [  ]  other:     Attention:  [ x  ] intact   [    ]  other:           Concentration: [ x  ] intact   [    ]  other:   Language: [  ] intact  [ x ] Able to name 3 objects and describe function          Memory: [   ] intact,    [ x ]  other: Impaired, able to recall 1/3 objects   Mood/Affect: [   ] wnl  [  x  ]  other: emotional labile (crying)                                                                          Communication: [ x  ]Fluent, no dysarthria [ ] other:   CN II - XII:  [    ] intact,  [  x  ] other: horizontal  nystagmus noted  with fixation                                                                                        Motor:   RUE: 5/5: shoulder abduction, elbow flexion, elbow extension,  finger flexion  LUE: 4+/5: shoulder abduction, 4+/5 elbow flexion, elbow extension, 4+/5 finger flexion  RLE: 5/5 hip flexion, knee ext, knee flex, ankle dorsiflexion, and ankle  plantarflexion  LLE: 4+/5: hip flexion, knee ext, knee flex, ankle dorsiflexion, and ankle  plantarflexion    Tone: [  x ] wnl,   [    ]  other:  DTRs: [ x ]symmetric, [   ] other:  Coordination:   [    ] intact,   [ x ] other: +FTN, and heel to shin on the L, + L dysdiadokinesia / dysmetria.                                                                     Sensory: [ x  ] Intact to light touch,   [  ] other:    No clonus; No spasticity Vital Signs Last 24 Hrs  T(C): 35.9 (07 Feb 2024 12:00), Max: 37 (07 Feb 2024 04:00)  T(F): 96.6 (07 Feb 2024 12:00), Max: 98.6 (07 Feb 2024 04:00)  HR: 52 (07 Feb 2024 12:00) (52 - 78)  BP: 142/63 (07 Feb 2024 12:00) (113/56 - 152/64)  BP(mean): 90 (07 Feb 2024 12:00) (78 - 107)  RR: 16 (07 Feb 2024 12:00) (16 - 19)  SpO2: 98% (07 Feb 2024 12:00) (94% - 99%)    General:[ x  ] NAD, Resting Comfortable   [   ] other:                                HEENT: [ x  ] NC/AT, EOMI,  Normal Conjunctivae,   [   ] other:   Cardio: [  x ] RRR, no murmur,   [   ] other:                              Pulm: [ x  ] No Respiratory Distress,  Lungs CTAB,   [   ] other:             Abdomen: [ x  ]ND/NT, Soft,   [   ] other:    : [ x  ] NO FRANCES CATHETER, [   ] FRANCES CATHETER present [ x ] other: PureWick  MSK: [ x ] No joint swelling, Full ROM   [   ] other:                                         Ext: [x ]No C/C/E, No calf tenderness,   [ ]other:    Skin: [ x  ]intact,   [   ] other: wound                                               Neurological Examination:  Cognitive: [  x  ] AAO x 3,   [  ]  other:     Attention:  [ x  ] intact   [    ]  other:           Concentration: [ x  ] intact   [    ]  other:   Language: [  ] intact  [ x ] Able to name 3 objects and describe function          Memory: [   ] intact,    [ x ]  other: Impaired, able to recall 1/3 objects   Mood/Affect: [   ] wnl  [  x  ]  other: emotional labile (crying)                                                                          Communication: [ x  ]Fluent, no dysarthria [ ] other:   CN II - XII:  [    ] intact,  [  x  ] other: horizontal  nystagmus noted  with fixation                                                                                        Motor:   RUE: 5/5: shoulder abduction, elbow flexion, elbow extension,  finger flexion  LUE: 4+/5: shoulder abduction, 4+/5 elbow flexion, elbow extension, 4+/5 finger flexion  RLE: 5/5 hip flexion, knee ext, knee flex, ankle dorsiflexion, and ankle  plantarflexion  LLE: 4+/5: hip flexion, knee ext, knee flex, ankle dorsiflexion, and ankle  plantarflexion    Tone: [  x ] wnl,   [    ]  other:  DTRs: [ x ]symmetric, [   ] other:  Coordination:   [    ] intact,   [ x ] other: +FTN, and heel to shin on the L, + L dysdiadokinesia / dysmetria.                                                                     Sensory: [ x  ] Intact to light touch,   [  ] other:    No clonus; No spasticity

## 2024-02-07 NOTE — DISCHARGE NOTE PROVIDER - CARE PROVIDER_API CALL
Charmaine Falcon  Neurology  [address] [place] [id number]  Phone: [phone]  Fax: [phone]  Follow Up Time: 2 weeks   Charmaine Falcon  Neurology  [address] [place] [id number]  Phone: [phone]  Fax: [phone]  Follow Up Time: 2 weeks    Mark Agarwal  Cardiac Electrophysiology  [address] [place] [id number]  Phone: [phone]  Fax: [phone]  Follow Up Time: 2 weeks

## 2024-02-07 NOTE — DISCHARGE NOTE PROVIDER - HOSPITAL COURSE
Hospital course:  This is an 83 year old female with past medical history of HLD, HTN, L knee OA presenting for unsteadiness and inability to ambulate. Patient states she was in her normal state of health yesterday after and had dinner with her friends. Afterwards took her recently prescribed meloxicam for her L knee OA. Shortly after she started feeling off when walking. She reports feeling dizzy and unsteady when attempting to walk. Since yesterday, her symptoms have not improved and thus decided to seek medical help.  In the ED, vitals significant for /72.       During this hospital course, patient had a (ischemic/hemorrhagic) stroke located in (left/right.....) as seen on (MRI/CT).   The stroke etiology is likely secondary to:  [] Atrial fibrillation  [] Small vessel disease from atherosclerotic risk factors  [] Other:  [] Etiology workup still in progress    Patient had the following workup done in house:  CT Head: L cerebellar subacute infarct  MR Head Non Contrast:  CT Angio Head: Severe stenosis at the origin of the left vertebral artery, Moderate stenosis of the proximal right subclavian artery due to mixed calcified and noncalcified atherosclerotic plaque.  CT Angio Neck:  [] Echo  [] Labs  [] Other    Physical exam at discharge:     New medications on discharge:  Labs to be followed up:  Imaging to be done as outpatient:  Further outpatient workup:   Hospital course:  This is an 83 year old female with past medical history of HLD, HTN, L knee OA presenting for unsteadiness and inability to ambulate. Patient states she was in her normal state of health yesterday after and had dinner with her friends. Afterwards took her recently prescribed meloxicam for her L knee OA. Shortly after she started feeling off when walking. She reports feeling dizzy and unsteady when attempting to walk. Since yesterday, her symptoms have not improved and thus decided to seek medical help.  In the ED, vitals significant for /72.       During this hospital course, patient was diagnosed with an ischemic stroke located in the left cerebellar hemisphere as seen on MRI. Unremarkable ECHO, etiology unclear, cardioembolic vs artery to artery embolism in the context of vessel disease.      The stroke etiology is likely secondary to:  [] Atrial fibrillation   x Small vessel disease from atherosclerotic risk factors. Cardioembolic source can't completely be ruled out at this time  [] Other:   [] Etiology workup still in progress    Patient had the following workup done in house:  CT Head: L cerebellar subacute infarct  MR Head Non Contrast: Acute infarcts involving the left cerebellar hemisphere without hemorrhagic transformation  CT Angio Head: Severe stenosis at the origin of the left vertebral artery, Moderate stenosis of the proximal right subclavian artery due to mixed calcified and noncalcified atherosclerotic plaque.  CT Angio Neck:  [] Echo: unremarkable, normal EF.       Physical exam at discharge:   -Mental status: Awake, alert, oriented to person, place, and time. Speech is fluent with intact naming, repetition, and comprehension, no dysarthria. Recent and remote memory intact. Follows commands. Attention/concentration intact. Fund of knowledge appropriate.  -Cranial nerves:   II: Visual fields are full to confrontation.  III, IV, VI: Extraocular movements are intact without nystagmus. Pupils equally round and reactive to light  V:  Facial sensation V1-V3 equal and intact   VII: Face is symmetric with normal eye closure and smile  VIII: Hearing is bilaterally intact to finger rub  IX, X: Uvula is midline and soft palate rises symmetrically  XI: Head turning and shoulder shrug are intact.  XII: Tongue protrudes midline  Motor: Normal bulk and tone. Very mild pronator drift on the LUE. Strength bilateral upper extremity 5/5, bilateral lower extremities 5/5.  Sensation: Intact to light touch bilaterally. No neglect or extinction on double simultaneous testing.  Coordination: Mild dysmetria on finger to nose in the left (fluctuates). Mild L heel to shin dysmetria.     New medications on discharge: Increased atorvastatin dose. ASA 81 indefinitely. Plavix for a total of 21 days. Lisinopril 5mg at bedtime.   Further outpatient workup: ILR to be placed by EP on monday at 4A

## 2024-02-07 NOTE — DISCHARGE NOTE NURSING/CASE MANAGEMENT/SOCIAL WORK - NSDCPEPTSTRK_GEN_ALL_CORE
Call 911 for stroke/Need for follow up after discharge/Prescribed medications/Risk factors for stroke/Stroke education booklet/Stroke support groups for patients, families, and friends/Stroke warning signs and symptoms/Signs and symptoms of stroke No

## 2024-02-07 NOTE — DISCHARGE NOTE PROVIDER - CARE PROVIDERS DIRECT ADDRESSES
,[email or web address]

## 2024-02-07 NOTE — DISCHARGE NOTE PROVIDER - PROVIDER TOKENS
PROVIDER:[TOKEN:[[id number]],FOLLOWUP:[2 weeks]] PROVIDER:[TOKEN:[[id number]],FOLLOWUP:[2 weeks]],PROVIDER:[TOKEN:[[id number]],FOLLOWUP:[2 weeks]]

## 2024-02-08 LAB
ALBUMIN SERPL ELPH-MCNC: 3.5 G/DL — SIGNIFICANT CHANGE UP (ref 3.5–5.2)
ALP SERPL-CCNC: 81 U/L — SIGNIFICANT CHANGE UP (ref 30–115)
ALT FLD-CCNC: 31 U/L — SIGNIFICANT CHANGE UP (ref 0–41)
ANION GAP SERPL CALC-SCNC: 14 MMOL/L — SIGNIFICANT CHANGE UP (ref 7–14)
AST SERPL-CCNC: 26 U/L — SIGNIFICANT CHANGE UP (ref 0–41)
BASOPHILS # BLD AUTO: 0.06 K/UL — SIGNIFICANT CHANGE UP (ref 0–0.2)
BASOPHILS NFR BLD AUTO: 0.7 % — SIGNIFICANT CHANGE UP (ref 0–1)
BILIRUB SERPL-MCNC: 0.8 MG/DL — SIGNIFICANT CHANGE UP (ref 0.2–1.2)
BUN SERPL-MCNC: 34 MG/DL — HIGH (ref 10–20)
CALCIUM SERPL-MCNC: 9.8 MG/DL — SIGNIFICANT CHANGE UP (ref 8.4–10.5)
CHLORIDE SERPL-SCNC: 103 MMOL/L — SIGNIFICANT CHANGE UP (ref 98–110)
CO2 SERPL-SCNC: 22 MMOL/L — SIGNIFICANT CHANGE UP (ref 17–32)
CREAT SERPL-MCNC: 0.8 MG/DL — SIGNIFICANT CHANGE UP (ref 0.7–1.5)
EGFR: 74 ML/MIN/1.73M2 — SIGNIFICANT CHANGE UP
EOSINOPHIL # BLD AUTO: 0.14 K/UL — SIGNIFICANT CHANGE UP (ref 0–0.7)
EOSINOPHIL NFR BLD AUTO: 1.7 % — SIGNIFICANT CHANGE UP (ref 0–8)
GLUCOSE SERPL-MCNC: 93 MG/DL — SIGNIFICANT CHANGE UP (ref 70–99)
HCT VFR BLD CALC: 38.4 % — SIGNIFICANT CHANGE UP (ref 37–47)
HGB BLD-MCNC: 12.8 G/DL — SIGNIFICANT CHANGE UP (ref 12–16)
IMM GRANULOCYTES NFR BLD AUTO: 0.4 % — HIGH (ref 0.1–0.3)
LYMPHOCYTES # BLD AUTO: 2.48 K/UL — SIGNIFICANT CHANGE UP (ref 1.2–3.4)
LYMPHOCYTES # BLD AUTO: 29.2 % — SIGNIFICANT CHANGE UP (ref 20.5–51.1)
MAGNESIUM SERPL-MCNC: 2 MG/DL — SIGNIFICANT CHANGE UP (ref 1.8–2.4)
MCHC RBC-ENTMCNC: 30.4 PG — SIGNIFICANT CHANGE UP (ref 27–31)
MCHC RBC-ENTMCNC: 33.3 G/DL — SIGNIFICANT CHANGE UP (ref 32–37)
MCV RBC AUTO: 91.2 FL — SIGNIFICANT CHANGE UP (ref 81–99)
MONOCYTES # BLD AUTO: 0.83 K/UL — HIGH (ref 0.1–0.6)
MONOCYTES NFR BLD AUTO: 9.8 % — HIGH (ref 1.7–9.3)
NEUTROPHILS # BLD AUTO: 4.94 K/UL — SIGNIFICANT CHANGE UP (ref 1.4–6.5)
NEUTROPHILS NFR BLD AUTO: 58.2 % — SIGNIFICANT CHANGE UP (ref 42.2–75.2)
NRBC # BLD: 0 /100 WBCS — SIGNIFICANT CHANGE UP (ref 0–0)
PLATELET # BLD AUTO: 178 K/UL — SIGNIFICANT CHANGE UP (ref 130–400)
PMV BLD: 11.4 FL — HIGH (ref 7.4–10.4)
POTASSIUM SERPL-MCNC: 3.7 MMOL/L — SIGNIFICANT CHANGE UP (ref 3.5–5)
POTASSIUM SERPL-SCNC: 3.7 MMOL/L — SIGNIFICANT CHANGE UP (ref 3.5–5)
PROT SERPL-MCNC: 5.8 G/DL — LOW (ref 6–8)
RBC # BLD: 4.21 M/UL — SIGNIFICANT CHANGE UP (ref 4.2–5.4)
RBC # FLD: 13.2 % — SIGNIFICANT CHANGE UP (ref 11.5–14.5)
SODIUM SERPL-SCNC: 139 MMOL/L — SIGNIFICANT CHANGE UP (ref 135–146)
WBC # BLD: 8.48 K/UL — SIGNIFICANT CHANGE UP (ref 4.8–10.8)
WBC # FLD AUTO: 8.48 K/UL — SIGNIFICANT CHANGE UP (ref 4.8–10.8)

## 2024-02-08 RX ORDER — TRAMADOL HYDROCHLORIDE 50 MG/1
25 TABLET ORAL EVERY 6 HOURS
Refills: 0 | Status: DISCONTINUED | OUTPATIENT
Start: 2024-02-08 | End: 2024-02-08

## 2024-02-08 RX ADMIN — CLOPIDOGREL BISULFATE 75 MILLIGRAM(S): 75 TABLET, FILM COATED ORAL at 11:48

## 2024-02-08 RX ADMIN — ATORVASTATIN CALCIUM 80 MILLIGRAM(S): 80 TABLET, FILM COATED ORAL at 21:48

## 2024-02-08 RX ADMIN — CARVEDILOL PHOSPHATE 12.5 MILLIGRAM(S): 80 CAPSULE, EXTENDED RELEASE ORAL at 06:34

## 2024-02-08 RX ADMIN — LISINOPRIL 5 MILLIGRAM(S): 2.5 TABLET ORAL at 21:48

## 2024-02-08 RX ADMIN — Medication 650 MILLIGRAM(S): at 11:48

## 2024-02-08 RX ADMIN — FAMOTIDINE 20 MILLIGRAM(S): 10 INJECTION INTRAVENOUS at 06:34

## 2024-02-08 RX ADMIN — ENOXAPARIN SODIUM 40 MILLIGRAM(S): 100 INJECTION SUBCUTANEOUS at 21:48

## 2024-02-08 RX ADMIN — CARVEDILOL PHOSPHATE 12.5 MILLIGRAM(S): 80 CAPSULE, EXTENDED RELEASE ORAL at 17:15

## 2024-02-08 RX ADMIN — POLYETHYLENE GLYCOL 3350 17 GRAM(S): 17 POWDER, FOR SOLUTION ORAL at 11:48

## 2024-02-08 RX ADMIN — FAMOTIDINE 20 MILLIGRAM(S): 10 INJECTION INTRAVENOUS at 17:15

## 2024-02-08 RX ADMIN — AMLODIPINE BESYLATE 10 MILLIGRAM(S): 2.5 TABLET ORAL at 06:34

## 2024-02-08 RX ADMIN — Medication 81 MILLIGRAM(S): at 11:48

## 2024-02-08 NOTE — PROGRESS NOTE ADULT - SUBJECTIVE AND OBJECTIVE BOX
Patient is a 83y old  Female who presents with a chief complaint of Rehab of left cerebellar stroke with left hemiataxia.(nondominant) (07 Feb 2024 14:25)    HPI:  This is a 82 y/o RHD F with PMHx of HLD, HTN, L knee OA presenting for unsteady gait.    Patient states she was in her normal state of health a day prior to presentation. She was outside having lunch with her friend when she started to notice she was off balance that persists after coming back home. For worsening unsteady gait and imbalance she decided  to seek medical help the next day. She admits to vomiting, but denies any HA, diplopia, N, dizziness, numbness/tingling, speech difficulties. In the ED, vitals were significant for /72. CTH notable for L cerebellar subacute infarct. 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 NIHSS on admission was 3. Patient was admitted to stroke unit for further work up.     On 2/6, the patient's Bp was elevated requiring 15mg of hydralazine overnight. CTH was repeated for possible worsening dysmetria, end gaze nystagmus, and NIHSS increase from 0 to 2 (for upper and lower left extremity dysmetria), urgent CTH showed no intracranial hemorrhage or midline shift. Left cerebellar hypodense lesion consistent with evolving subacute infarct.    The patient was evaluated by the PM&R team once medically stable. The patient was found to have functional limitation in terms of muscle strength, endurance, physical mobility, and ability to carry out activities of daily living (self care, transfers, and ambulation). The patient was started on a course of bedside therapy, the pt is motivated and able to start 3 hours of therapy  daily for 6-7 days a week. With PT/OT,  pt required Min A w transfers, Mod A w UB dressing, Mod A w LB dressing. Amb 15' using RW requiring Mod A. SLP evaluated the patient and recommended regular with thin liquids. The patient was deemed to be a good candidate for admission for acute inpatient rehab. The patient was admitted to acute inpatient rehab on 2/7/24.    (07 Feb 2024 14:25)      I examined the patient and reviewed the chart. There have been no significant changes since my history and physical except where documented below.    TODAY'S SUBJECTIVE & REVIEW OF SYMPTOMS:  Patient was seen and examined at bedside this morning  No acute overnight events reported   Denies any acute complaints  Patient is motivated to start therapy  Having regular BM and voiding freely.     Vital signs reviewed and stable    Constitutional:    [x ] WNL           [   ] poor appetite   [   ] insomnia   [ x ] tired   Cardio:                [ x ] WNL           [   ] CP   [  ] BARBOSA   [   ] palpitations               Resp:                   [  x ] WNL           [   ] SOB   [   ] cough   [   ] wheezing   GI:                        [ x  ] WNL           [   ] constipation  [   ] diarrhea   [   ] abdominal pain   [   ] nausea   [   ] emesis                                :                      [   ] WNL           [   ] FRANCES  [   ] dysuria   [   ] difficulty voiding   [x]: Other: PureWick   Endo:                   [ x  ] WNL          [   ] polyuria   [   ] temperature intolerance                 Skin:                     [ x  ] WNL          [   ] pain   [   ] wound  [   ] rash   MSK:                    [  ] WNL          [  ] muscle pain   [ x ] joint pain/ stiffness-b/l knee pain L>R   [   ] muscle tenderness   [   ] swelling   Neuro:                 [   ] WNL          [   ] HA   [   ] change in vision   [   ] tremor   [x  ] LSW weakness  [   ]dysphagia            Cognitive:           [ x ] WNL           [  ]confusion      Psych:                  [  x ] WNL           [   ] hallucinations   [   ]agitation   [   ] delusion   [   ]depression    PHYSICAL EXAM    Vital Signs Last 24 Hrs  T(C): 36.8 (07 Feb 2024 21:35), Max: 36.8 (07 Feb 2024 21:35)  T(F): 98.3 (07 Feb 2024 21:35), Max: 98.3 (07 Feb 2024 21:35)  HR: 67 (07 Feb 2024 21:35) (52 - 67)  BP: 150/65 (07 Feb 2024 21:35) (135/59 - 150/65)  BP(mean): 94 (07 Feb 2024 21:35) (76 - 94)  RR: 20 (07 Feb 2024 21:35) (16 - 20)  SpO2: 97% (07 Feb 2024 20:00) (97% - 98%)    General:[ x  ] NAD, Resting Comfortable   [   ] other:                                HEENT: [ x  ] NC/AT, EOMI,  Normal Conjunctivae,   [   ] other:   Cardio: [  x ] RRR, no murmur,   [   ] other:                              Pulm: [ x  ] No Respiratory Distress,  Lungs CTAB,   [   ] other:             Abdomen: [ x  ]ND/NT, Soft,   [   ] other:    : [ x  ] NO FRANCES CATHETER, [   ] FRANCES CATHETER present [ x ] other: PureWick  MSK: [ x ] No joint swelling, Full ROM   [   ] other:                                         Ext: [x ]No C/C/E, No calf tenderness,   [ ]other:    Skin: [ x  ]intact,   [   ] other: wound                                               Neurological Examination:  Cognitive: [  x  ] AAO x 3,   [  ]  other:     Attention:  [ x  ] intact   [    ]  other:           Concentration: [ x  ] intact   [    ]  other:   Language: [  ] intact  [ x ] Able to name 3 objects and describe function          Memory: [   ] intact,    [ x ]  other: Impaired, able to recall 1/3 objects   Mood/Affect: [   ] wnl  [  x  ]  other: emotional labile (crying)                                                                          Communication: [ x  ]Fluent, no dysarthria [ ] other:   CN II - XII:  [    ] intact,  [  x  ] other: horizontal  nystagmus noted  with fixation                                                                                        Motor:   RUE: 5/5: shoulder abduction, elbow flexion, elbow extension,  finger flexion  LUE: 4+/5: shoulder abduction, 4+/5 elbow flexion, elbow extension, 4+/5 finger flexion  RLE: 5/5 hip flexion, knee ext, knee flex, ankle dorsiflexion, and ankle  plantarflexion  LLE: 4+/5: hip flexion, knee ext, knee flex, ankle dorsiflexion, and ankle  plantarflexion    Tone: [  x ] wnl,   [    ]  other:  DTRs: [ x ]symmetric, [   ] other:  Coordination:   [    ] intact,   [ x ] other: +FTN, and heel to shin on the L, + L dysdiadokinesia / dysmetria.                                                                     Sensory: [ x  ] Intact to light touch,   [  ] other:    No clonus; No spasticity    MEDICATIONS  (STANDING):  amLODIPine   Tablet 10 milliGRAM(s) Oral daily  aspirin enteric coated 81 milliGRAM(s) Oral daily  atorvastatin 80 milliGRAM(s) Oral at bedtime  carvedilol 12.5 milliGRAM(s) Oral every 12 hours  clopidogrel Tablet 75 milliGRAM(s) Oral daily  enoxaparin Injectable 40 milliGRAM(s) SubCutaneous every 24 hours  famotidine    Tablet 20 milliGRAM(s) Oral two times a day  hydrochlorothiazide 25 milliGRAM(s) Oral daily  lisinopril 5 milliGRAM(s) Oral at bedtime  polyethylene glycol 3350 17 Gram(s) Oral daily    MEDICATIONS  (PRN):  acetaminophen     Tablet .. 650 milliGRAM(s) Oral every 6 hours PRN Temp greater or equal to 38C (100.4F), Mild Pain (1 - 3)  melatonin 3 milliGRAM(s) Oral at bedtime PRN Insomnia  senna 2 Tablet(s) Oral at bedtime PRN Constipation        RECENT LABS/IMAGING                                       13.4   9.22  )-----------( 241      ( 07 Feb 2024 06:01 )             39.3     02-07    138  |  102  |  24<H>  ----------------------------<  98  3.6   |  24  |  0.7    Ca    9.9      07 Feb 2024 06:01  Mg     2.0     02-07        Urinalysis Basic - ( 07 Feb 2024 06:01 )    Color: x / Appearance: x / SG: x / pH: x  Gluc: 98 mg/dL / Ketone: x  / Bili: x / Urobili: x   Blood: x / Protein: x / Nitrite: x   Leuk Esterase: x / RBC: x / WBC x   Sq Epi: x / Non Sq Epi: x / Bacteria: x

## 2024-02-08 NOTE — PROGRESS NOTE ADULT - ASSESSMENT
Assessment:  This is a 82 y/o RHD F with PMHx of HLD, HTN, L knee OA presenting for worsening unsteady gait. In the ED, vitals were significant for /72. CTH notable for L cerebellar subacute infarct. 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 NIHSS on admission was 3 for ataxia L sided and LLE. Patient was admitted to stroke unit for further work up. On 2/6, the patient's Bp was elevated requiring 15mg of hydralazine overnight. CTH was repeated for possible worsening dysmetria, end gaze nystagmus, and NIHSS increase from 0 to 2 (for upper and lower left extremity dysmetria), urgent CTH showed no intracranial hemorrhage or midline shift. Left cerebellar hypodense lesion consistent with evolving subacute infarct.    The patient was evaluated by the PM&R team once medically stable. The patient was found to have functional limitation in terms of muscle strength, endurance, physical mobility, and ability to carry out activities of daily living (self care, transfers, and ambulation). The patient was started on a course of bedside therapy, the pt is motivated and able to start 3 hours of therapy  daily for 6-7 days a week. With PT/OT, pt required Min A w transfers, Mod A w UB dressing, Mod A w LB dressing. Amb 15' using RW requiring Mod A.  SLP evaluated the patient and recommended regular with thin liquids.The patient was deemed to be a good candidate for admission for acute inpatient rehab. The patient was admitted to acute inpatient rehab on 2/7/24.     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 Normal EF  NIHSS+2= Ataxia in 2 limbs on admission to rehab  - S/p Loaded Aspirin 325mg and Plavix 300mg  - C/w Aspirin 81mg and Plavix 75mg daily for 90 days  - EP consulted. ILR to be placed likely on Monday  - HOB at 45 degrees, aspiration precautions  - PT/OT/ST/ neuropsych eval    #HTN  - SBP goal: 110-150  - c/w Carvedilol 20 mg extended release daily  - c/w Hctz 25mg daily  - c/w amlodipine 10mg once daily   -c/w lisinopril 5 mg at bedtime   -Will monitor and adjust accordingly     #HLD  - c/w lipitor 80mg daily    #Bilat knee OA/ left knee chronic pain  - no NSAIDS for now  - Tylenol PRN for pain  - Modalities   - consider Voltaren cream and/ or Lidoderm patch and Tylenol      #GI/Bowel Mgmt:/ Constipation  - finally had BM today with relief  - monitor on Miralax, Senna    -FEN : Monitor Na levels    - Diet: DASH/TLC    Precautions / PROPHYLAXIS:      - Falls      - DVT prophylaxis: Lovenox

## 2024-02-08 NOTE — PROGRESS NOTE ADULT - ATTENDING COMMENTS
Patient seen and examined with the resident. We discussed the case. I have directed the care. I edited the note. The patient requires acute rehab with 3 hours of daily therapies at least 5 out of 7 days and close physiatry follow up.  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 Normal EF  NIHSS+2= Ataxia in 2 limbs on admission to rehab  - S/p Loaded Aspirin 325mg and Plavix 300mg  - C/w Aspirin 81mg and Plavix 75mg daily for 90 days  - EP consulted. ILR to be placed likely on Monday  - HOB at 45 degrees, aspiration precautions  - PT/OT/ST/ neuropsych eval    #HTN  - SBP goal: 110-150  - c/w Carvedilol 20 mg extended release daily  - c/w Hctz 25mg daily  - c/w amlodipine 10mg once daily   -c/w lisinopril 5 mg at bedtime   -Will monitor and adjust accordingly     #HLD  - c/w lipitor 80mg daily    #Bilat knee OA/ left knee chronic pain  - no NSAIDS for now  - Tylenol PRN for pain  - Modalities   - consider Voltaren cream and/ or Lidoderm patch and Tylenol

## 2024-02-08 NOTE — PROGRESS NOTE ADULT - ASSESSMENT
Neuropsychology Consult          Treatment Session focused on: Cognitive functioning and Family Contact     Pain: Yes Location: Bilateral shoulders Ratin/10     Orientation: [facility]     Arousal Level: Alert     Behavior: Cooperative, friendly     Affect/Mood Range: [facility]      Needed: No   #: N/A     Attention: [facility]     Insight into illness/deficits: [facility]          Neuropsychology service was consulted to evaluate this 83-year-old right-hand dominant female patient following admission to  for linguistic and cognitive deficits following a left cerebellar CVA. Per chart, noted medical history includes osteoarthritis, HLD, and HTN. Medications include clopidogrel, amlodipine, acetaminophen, atorvastatin, famotidine, and lisinopril.      Patient was seen at bedside today and reported shoulder pain. The patient and her daughter provided relevant social history and collateral report of premorbid functioning. Ms. Behar reported having four children (2 boys and 2 girls). 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 The patient currently lives with her daughter in a private home. Ms. Behar reported that she could perform ADLs and iADLS. The patient and her daughter denied premorbid cognitive difficulties. Ms. Behar denied a history of psychiatric symptoms. She reported quitting smoking cigarettes for over 40 years. Ms. Behar denied drinking alcohol and using drugs. Vision and hearing were unremarkable. The patient and her family hope that she will get stronger and return to baseline functioning due to her recent medical event.      Measure(s) Given:     Patient was evaluated and the following neuropsychological measures were given: Cognistat Active Form, Clock Drawing Test, and Test of Premorbid Functioning           Results:      Attention/Executive Functioning:      Her basic auditory attention was intact, as she recalled a total of 6 numbers in forward sequencing. Her ability to recall numbers in reverse order was impaired (3 digits).     On tasks involving mental math, the patient’s ability to implement working memory and concentration was average.      Learning:      Verbal encoding for a list of words was average (4/4 words recalled in 2 trials).          Memory:      She did not recall any of the 4-list words spontaneously. The patient slightly benefitted from category cues, as she gained 3 words.            Regarding nonverbal memory, patient recalled the gestalt of the two designs, but made mild distortions, suggesting poor attention to detail.           Language: Language abilities suggested intact confrontation naming skills when presented with visual prompts (8/8 picture objects named correctly). On a speech activity, she demonstrated adequate ability to describe a picture containing various details.     On a command task, the patient successfully demonstrated following 2-step verbal commands.     Visuospatial/constructional: Clock drawing demonstrated mild difficulties with planning and executive functioning skills, as the clock exhibited poor spacing. She indicated the correct time. Patient successfully wrote her name.           Patient’s visuo-constructive reasoning and abstract pattern completion was average.           Verbal Reasoning: Patient exhibited average logical thinking /verbal abstraction skills.      Judgement: Patient’s ability to assess judgment and decision-making skills related to situations likely to be encountered in daily life was average.     Apraxia Exam: The patient successfully performed tasks involving motor planning to perform tasks or movements.      Premorbid Functioning: Patient’s premorbid verbal intelligence on a single word, oral reading test was estimated to be in the average range.          Summary:     Patient’s premorbid functioning was estimated within the average range. Patient’s cognitive profile reveals preserved basic language, attention, processing, and learning skills. She demonstrated difficulties encoding and recalling information in list form, likely moderated by weak executive processes, mainly working memory. Additional testing will be provided to assess further language, given it is a concern upon admission. Given the reported cognitive difficulties, mainly memory at baseline suggests that cognitive presentation likely exacerbated by the effects of recent stroke. Prognosis is good, as patient will likely make gains. Patient will be retained on NP service to further evaluate cognition and provide cognitive remediation.          Goals: Monitor cognition, support cognitive recovery and adjustment; cognitive remediation           Plan: Monitor cognition; provide feedback prior to discharge; 2-3 sessions weekly at 30-60 minutes each    Neuropsychology Consult          Treatment Session focused on: Cognitive functioning and Family Contact     Pain: Yes Location: Bilateral shoulders Ratin/10     Orientation: [facility]     Arousal Level: Alert     Behavior: Cooperative, friendly     Affect/Mood Range: [facility]      Needed: No   #: N/A     Attention: [facility]     Insight into illness/deficits: [facility]          Neuropsychology service was consulted to evaluate this 83-year-old right-hand dominant female patient following admission to  for linguistic and cognitive deficits following a left cerebellar CVA. Per chart, noted medical history includes osteoarthritis, HLD, and HTN. Medications include clopidogrel, amlodipine, acetaminophen, atorvastatin, famotidine, and lisinopril.      Patient was seen at bedside today and reported shoulder pain. The patient and her daughter provided relevant social history and collateral report of premorbid functioning. Ms. Behar reported having four children (2 boys and 2 girls). 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 The patient currently lives with her daughter in a private home. Ms. Behar reported that she could perform ADLs and iADLS. The patient and her daughter denied premorbid cognitive difficulties. Ms. Behar denied a history of psychiatric symptoms. She reported quitting smoking cigarettes for over 40 years. Ms. Behar denied drinking alcohol and using drugs. Vision and hearing were unremarkable. The patient and her family hope that she will get stronger and return to baseline functioning due to her recent medical event.      Measure(s) Given:     Patient was evaluated and the following neuropsychological measures were given: Cognistat Active Form, Clock Drawing Test, and Test of Premorbid Functioning           Results:      Attention/Executive Functioning:      Her basic auditory attention was intact, as she recalled a total of 6 numbers in forward sequencing. Her ability to recall numbers in reverse order was impaired (3 digits).     On tasks involving mental math, the patient’s ability to implement working memory and concentration was average.      Learning:      Verbal encoding for a list of words was average (4/4 words recalled in 2 trials).       Memory:      She did not recall any of the 4-list words spontaneously which suggest poor recall. The patient slightly benefitted from category cues, as she gained 3 words.            Regarding nonverbal memory, patient recalled the gestalt of the two designs, but made mild distortions, suggesting poor attention to detail.           Language: Language abilities suggested intact confrontation naming skills when presented with visual prompts (8/8 picture objects named correctly). On a speech activity, she demonstrated adequate ability to describe a picture containing various details.     On a command task, the patient successfully demonstrated following 2-step verbal commands.     Visuospatial/constructional: Clock drawing demonstrated mild difficulties with planning and executive functioning skills, as the clock exhibited poor spacing. She indicated the correct time. Patient successfully wrote her name.           Patient’s visuo-constructive reasoning and abstract pattern completion was average.           Verbal Reasoning: Patient exhibited average logical thinking /verbal abstraction skills.      Judgement: Patient’s ability to assess judgment and decision-making skills related to situations likely to be encountered in daily life was average.     Apraxia Exam: The patient successfully performed tasks involving motor planning to perform tasks or movements.      Premorbid Functioning: Patient’s premorbid verbal intelligence on a single word, oral reading test was estimated to be in the average range.          Summary:     Patient’s premorbid functioning was estimated within the average range. Patient’s cognitive profile reveals preserved basic language, attention, processing, and learning skills. She demonstrated difficulties with executive functioning, encoding and recalling new information which suggest a decline in cognitive functioning. Additional testing will be provided to assess further language, given it is a concern upon admission. Given the reported memory  difficulties likely exacerbated by the effects of recent stroke. Prognosis is good, as patient will likely make gains. Patient will be retained on NP service to further evaluate cognition and provide cognitive remediation.          Goals: Monitor cognition, support cognitive recovery and adjustment; cognitive remediation           Plan: Monitor cognition; provide feedback prior to discharge; 2-3 sessions weekly at 30-60 minutes each

## 2024-02-09 LAB
ANION GAP SERPL CALC-SCNC: 12 MMOL/L — SIGNIFICANT CHANGE UP (ref 7–14)
BUN SERPL-MCNC: 35 MG/DL — HIGH (ref 10–20)
CALCIUM SERPL-MCNC: 9.8 MG/DL — SIGNIFICANT CHANGE UP (ref 8.4–10.4)
CHLORIDE SERPL-SCNC: 102 MMOL/L — SIGNIFICANT CHANGE UP (ref 98–110)
CO2 SERPL-SCNC: 23 MMOL/L — SIGNIFICANT CHANGE UP (ref 17–32)
CREAT SERPL-MCNC: 0.8 MG/DL — SIGNIFICANT CHANGE UP (ref 0.7–1.5)
EGFR: 74 ML/MIN/1.73M2 — SIGNIFICANT CHANGE UP
GLUCOSE SERPL-MCNC: 105 MG/DL — HIGH (ref 70–99)
POTASSIUM SERPL-MCNC: 3.8 MMOL/L — SIGNIFICANT CHANGE UP (ref 3.5–5)
POTASSIUM SERPL-SCNC: 3.8 MMOL/L — SIGNIFICANT CHANGE UP (ref 3.5–5)
SODIUM SERPL-SCNC: 137 MMOL/L — SIGNIFICANT CHANGE UP (ref 135–146)

## 2024-02-09 RX ORDER — SODIUM CHLORIDE 9 MG/ML
500 INJECTION INTRAMUSCULAR; INTRAVENOUS; SUBCUTANEOUS ONCE
Refills: 0 | Status: COMPLETED | OUTPATIENT
Start: 2024-02-09 | End: 2024-02-09

## 2024-02-09 RX ORDER — CARVEDILOL PHOSPHATE 80 MG/1
6.25 CAPSULE, EXTENDED RELEASE ORAL EVERY 12 HOURS
Refills: 0 | Status: DISCONTINUED | OUTPATIENT
Start: 2024-02-10 | End: 2024-02-22

## 2024-02-09 RX ORDER — AMLODIPINE BESYLATE 2.5 MG/1
5 TABLET ORAL DAILY
Refills: 0 | Status: DISCONTINUED | OUTPATIENT
Start: 2024-02-10 | End: 2024-02-12

## 2024-02-09 RX ORDER — SODIUM CHLORIDE 9 MG/ML
500 INJECTION INTRAMUSCULAR; INTRAVENOUS; SUBCUTANEOUS ONCE
Refills: 0 | Status: DISCONTINUED | OUTPATIENT
Start: 2024-02-09 | End: 2024-02-09

## 2024-02-09 RX ORDER — SODIUM CHLORIDE 9 MG/ML
1000 INJECTION, SOLUTION INTRAVENOUS
Refills: 0 | Status: DISCONTINUED | OUTPATIENT
Start: 2024-02-09 | End: 2024-03-02

## 2024-02-09 RX ADMIN — SODIUM CHLORIDE 75 MILLILITER(S): 9 INJECTION, SOLUTION INTRAVENOUS at 20:36

## 2024-02-09 RX ADMIN — CARVEDILOL PHOSPHATE 12.5 MILLIGRAM(S): 80 CAPSULE, EXTENDED RELEASE ORAL at 05:40

## 2024-02-09 RX ADMIN — FAMOTIDINE 20 MILLIGRAM(S): 10 INJECTION INTRAVENOUS at 05:40

## 2024-02-09 RX ADMIN — Medication 81 MILLIGRAM(S): at 10:37

## 2024-02-09 RX ADMIN — AMLODIPINE BESYLATE 10 MILLIGRAM(S): 2.5 TABLET ORAL at 05:40

## 2024-02-09 RX ADMIN — ENOXAPARIN SODIUM 40 MILLIGRAM(S): 100 INJECTION SUBCUTANEOUS at 22:08

## 2024-02-09 RX ADMIN — CARVEDILOL PHOSPHATE 12.5 MILLIGRAM(S): 80 CAPSULE, EXTENDED RELEASE ORAL at 18:29

## 2024-02-09 RX ADMIN — SODIUM CHLORIDE 500 MILLILITER(S): 9 INJECTION INTRAMUSCULAR; INTRAVENOUS; SUBCUTANEOUS at 19:10

## 2024-02-09 RX ADMIN — POLYETHYLENE GLYCOL 3350 17 GRAM(S): 17 POWDER, FOR SOLUTION ORAL at 10:35

## 2024-02-09 RX ADMIN — CLOPIDOGREL BISULFATE 75 MILLIGRAM(S): 75 TABLET, FILM COATED ORAL at 10:37

## 2024-02-09 RX ADMIN — FAMOTIDINE 20 MILLIGRAM(S): 10 INJECTION INTRAVENOUS at 18:29

## 2024-02-09 RX ADMIN — ATORVASTATIN CALCIUM 80 MILLIGRAM(S): 80 TABLET, FILM COATED ORAL at 22:07

## 2024-02-09 NOTE — PROGRESS NOTE ADULT - SUBJECTIVE AND OBJECTIVE BOX
Patient is a 83y old  Female who presents with a chief complaint of Rehab of left cerebellar stroke with left hemiataxia.(nondominant) (07 Feb 2024 14:25)    HPI:  This is a 82 y/o RHD F with PMHx of HLD, HTN, L knee OA presenting for unsteady gait.    Patient states she was in her normal state of health a day prior to presentation. She was outside having lunch with her friend when she started to notice she was off balance that persists after coming back home. For worsening unsteady gait and imbalance she decided  to seek medical help the next day. She admits to vomiting, but denies any HA, diplopia, N, dizziness, numbness/tingling, speech difficulties. In the ED, vitals were significant for /72. CTH notable for L cerebellar subacute infarct. 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 NIHSS on admission was 3. Patient was admitted to stroke unit for further work up.     On 2/6, the patient's Bp was elevated requiring 15mg of hydralazine overnight. CTH was repeated for possible worsening dysmetria, end gaze nystagmus, and NIHSS increase from 0 to 2 (for upper and lower left extremity dysmetria), urgent CTH showed no intracranial hemorrhage or midline shift. Left cerebellar hypodense lesion consistent with evolving subacute infarct.    The patient was evaluated by the PM&R team once medically stable. The patient was found to have functional limitation in terms of muscle strength, endurance, physical mobility, and ability to carry out activities of daily living (self care, transfers, and ambulation). The patient was started on a course of bedside therapy, the pt is motivated and able to start 3 hours of therapy  daily for 6-7 days a week. With PT/OT,  pt required Min A w transfers, Mod A w UB dressing, Mod A w LB dressing. Amb 15' using RW requiring Mod A. SLP evaluated the patient and recommended regular with thin liquids. The patient was deemed to be a good candidate for admission for acute inpatient rehab. The patient was admitted to acute inpatient rehab on 2/7/24.    (07 Feb 2024 14:25)    I examined the patient and reviewed the chart. There have been no significant changes since my history and physical except where documented below.    TODAY'S SUBJECTIVE & REVIEW OF SYMPTOMS:  Patient was seen and examined at bedside this morning  No acute overnight events reported   Reported coughing, sneezing and itchy throat since yesterday Denies any other acute complaints  Patient is participating/tolerating therapies well.  Having regular BM and voiding freely.     Vital signs reviewed and stable  Labs reviewed, oral hydration is encouraged  Will order respiratory panel     Constitutional:    [x ] WNL           [   ] poor appetite   [   ] insomnia   [ x ] tired   Cardio:                [ x ] WNL           [   ] CP   [  ] BARBOSA   [   ] palpitations               Resp:                   [  x ] WNL           [   ] SOB   [   ] cough   [   ] wheezing   GI:                        [ x  ] WNL           [   ] constipation  [   ] diarrhea   [   ] abdominal pain   [   ] nausea   [   ] emesis                                :                      [   ] WNL           [   ] FRANCES  [   ] dysuria   [   ] difficulty voiding   [x]: Other: PureWick   Endo:                   [ x  ] WNL          [   ] polyuria   [   ] temperature intolerance                 Skin:                     [ x  ] WNL          [   ] pain   [   ] wound  [   ] rash   MSK:                    [  ] WNL          [  ] muscle pain   [ x ] joint pain/ stiffness-b/l knee pain L>R   [   ] muscle tenderness   [   ] swelling   Neuro:                 [   ] WNL          [   ] HA   [   ] change in vision   [   ] tremor   [x  ] LSW weakness  [   ]dysphagia            Cognitive:           [ x ] WNL           [  ]confusion      Psych:                  [  x ] WNL           [   ] hallucinations   [   ]agitation   [   ] delusion   [   ]depression    PHYSICAL EXAM    Vital Signs Last 24 Hrs  T(C): 36.7 (09 Feb 2024 05:17), Max: 36.8 (08 Feb 2024 13:26)  T(F): 98 (09 Feb 2024 05:17), Max: 98.2 (08 Feb 2024 13:26)  HR: 75 (09 Feb 2024 05:17) (61 - 75)  BP: 138/63 (09 Feb 2024 05:17) (125/60 - 142/62)  BP(mean): 86 (08 Feb 2024 17:10) (86 - 86)  RR: 19 (09 Feb 2024 05:17) (18 - 19)    General:[ x  ] NAD, Resting Comfortable   [   ] other:                                HEENT: [ x  ] NC/AT, EOMI,  Normal Conjunctivae,   [   ] other:   Cardio: [  x ] RRR, no murmur,   [   ] other:                              Pulm: [ x  ] No Respiratory Distress,  Lungs CTAB,   [   ] other:             Abdomen: [ x  ]ND/NT, Soft,   [   ] other:    : [ x  ] NO FRANCES CATHETER, [   ] FRANCES CATHETER present [ x ] other: PureWick  MSK: [ x ] No joint swelling, Full ROM   [   ] other:                                         Ext: [x ]No C/C/E, No calf tenderness,   [ ]other:    Skin: [ x  ]intact,   [   ] other: wound                                               Neurological Examination:  Cognitive: [  x  ] AAO x 3,   [  ]  other:     Attention:  [ x  ] intact   [    ]  other:           Concentration: [ x  ] intact   [    ]  other:   Language: [  ] intact  [ x ] Able to name 3 objects and describe function          Memory: [   ] intact,    [ x ]  other: Impaired, able to recall 1/3 objects   Mood/Affect: [   ] wnl  [  x  ]  other: emotional labile (crying)                                                                          Communication: [ x  ]Fluent, no dysarthria [ ] other:   CN II - XII:  [    ] intact,  [  x  ] other: horizontal  nystagmus noted  with fixation                                                                                        Motor:   RUE: 5/5: shoulder abduction, elbow flexion, elbow extension,  finger flexion  LUE: 4+/5: shoulder abduction, 4+/5 elbow flexion, elbow extension, 4+/5 finger flexion  RLE: 5/5 hip flexion, knee ext, knee flex, ankle dorsiflexion, and ankle  plantarflexion  LLE: 4+/5: hip flexion, knee ext, knee flex, ankle dorsiflexion, and ankle  plantarflexion    Tone: [  x ] wnl,   [    ]  other:  DTRs: [ x ]symmetric, [   ] other:  Coordination:   [    ] intact,   [ x ] other: +FTN, and heel to shin on the L, + L dysdiadokinesia / dysmetria.                                                                     Sensory: [ x  ] Intact to light touch,   [  ] other:    No clonus; No spasticity    MEDICATIONS  (STANDING):  amLODIPine   Tablet 10 milliGRAM(s) Oral daily  aspirin enteric coated 81 milliGRAM(s) Oral daily  atorvastatin 80 milliGRAM(s) Oral at bedtime  carvedilol 12.5 milliGRAM(s) Oral every 12 hours  clopidogrel Tablet 75 milliGRAM(s) Oral daily  enoxaparin Injectable 40 milliGRAM(s) SubCutaneous every 24 hours  famotidine    Tablet 20 milliGRAM(s) Oral two times a day  hydrochlorothiazide 25 milliGRAM(s) Oral daily  lisinopril 5 milliGRAM(s) Oral at bedtime  polyethylene glycol 3350 17 Gram(s) Oral daily  VOLTAREN  GEL  1%  TOPICAL OINTMENT 1 Application(s) 1 Application(s) Topical three times a day    MEDICATIONS  (PRN):  acetaminophen     Tablet .. 650 milliGRAM(s) Oral every 6 hours PRN Temp greater or equal to 38C (100.4F), Mild Pain (1 - 3)  melatonin 3 milliGRAM(s) Oral at bedtime PRN Insomnia  senna 2 Tablet(s) Oral at bedtime PRN Constipation  traMADol 25 milliGRAM(s) Oral every 6 hours PRN severe pain shoulders      RECENT LABS/IMAGING                                                12.8   8.48  )-----------( 178      ( 08 Feb 2024 07:50 )             38.4     02-09    137  |  102  |  35<H>  ----------------------------<  105<H>  3.8   |  23  |  0.8    Ca    9.8      09 Feb 2024 06:46  Mg     2.0     02-08    TPro  5.8<L>  /  Alb  3.5  /  TBili  0.8  /  DBili  x   /  AST  26  /  ALT  31  /  AlkPhos  81  02-08      Urinalysis Basic - ( 09 Feb 2024 06:46 )    Color: x / Appearance: x / SG: x / pH: x  Gluc: 105 mg/dL / Ketone: x  / Bili: x / Urobili: x   Blood: x / Protein: x / Nitrite: x   Leuk Esterase: x / RBC: x / WBC x   Sq Epi: x / Non Sq Epi: x / Bacteria: x

## 2024-02-09 NOTE — PROGRESS NOTE ADULT - ASSESSMENT
Assessment:  This is a 82 y/o RHD F with PMHx of HLD, HTN, L knee OA presenting for worsening unsteady gait. In the ED, vitals were significant for /72. CTH notable for L cerebellar subacute infarct. 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 NIHSS on admission was 3 for ataxia L sided and LLE. Patient was admitted to stroke unit for further work up. On 2/6, the patient's Bp was elevated requiring 15mg of hydralazine overnight. CTH was repeated for possible worsening dysmetria, end gaze nystagmus, and NIHSS increase from 0 to 2 (for upper and lower left extremity dysmetria), urgent CTH showed no intracranial hemorrhage or midline shift. Left cerebellar hypodense lesion consistent with evolving subacute infarct.    The patient was evaluated by the PM&R team once medically stable. The patient was found to have functional limitation in terms of muscle strength, endurance, physical mobility, and ability to carry out activities of daily living (self care, transfers, and ambulation). The patient was started on a course of bedside therapy, the pt is motivated and able to start 3 hours of therapy  daily for 6-7 days a week. With PT/OT, pt required Min A w transfers, Mod A w UB dressing, Mod A w LB dressing. Amb 15' using RW requiring Mod A.  SLP evaluated the patient and recommended regular with thin liquids.The patient was deemed to be a good candidate for admission for acute inpatient rehab. The patient was admitted to acute inpatient rehab on 2/7/24.     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 Normal EF  NIHSS+2= Ataxia in 2 limbs on admission to rehab  - S/p Loaded Aspirin 325mg and Plavix 300mg  - C/w Aspirin 81mg and Plavix 75mg daily for 90 days  - EP consulted. ILR to be placed likely on Monday  - HOB at 45 degrees, aspiration precautions  - PT/OT/ST/ neuropsych eval    #HTN  - SBP goal: 110-150  - c/w Carvedilol 20 mg extended release daily  - c/w Hctz 25mg daily  - c/w amlodipine 10mg once daily   -c/w lisinopril 5 mg at bedtime   -Will monitor and adjust accordingly     #HLD  - c/w lipitor 80mg daily    #Bilat knee OA/ left knee chronic pain  - no NSAIDS for now  - Tylenol PRN for pain  - Modalities   - consider Voltaren cream and/ or Lidoderm patch and Tylenol      #GI/Bowel Mgmt:/ Constipation  - finally had BM today with relief  - monitor on Miralax, Senna    -FEN : Monitor Na levels    - Diet: DASH/TLC    Precautions / PROPHYLAXIS:      - Falls      - DVT prophylaxis: Lovenox        Patient/Caregiver provided printed discharge information.

## 2024-02-09 NOTE — PROGRESS NOTE ADULT - ATTENDING COMMENTS
Patient seen and examined with the resident. We discussed the case. I have directed the care. I edited the note. The patient requires acute rehab with 3 hours of daily therapies at least 5 out of 7 days and close physiatry follow up. She warrants a viral panel swab based on her symptoms.  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 Normal EF  NIHSS+2= Ataxia in 2 limbs on admission to rehab  - S/p Loaded Aspirin 325mg and Plavix 300mg  - C/w Aspirin 81mg and Plavix 75mg daily for 90 days  - EP consulted. ILR to be placed likely on Monday  - HOB at 45 degrees, aspiration precautions  - PT/OT/ST/ neuropsych eval    #HTN  - SBP goal: 110-150  - c/w Carvedilol 20 mg extended release daily  - c/w Hctz 25mg daily  - c/w amlodipine 10mg once daily   -c/w lisinopril 5 mg at bedtime   -Will monitor and adjust accordingly     #HLD  - c/w lipitor 80mg daily    #Bilat knee OA/ left knee chronic pain  - no NSAIDS for now  - Tylenol PRN for pain  - Modalities   - consider Voltaren cream and/ or Lidoderm patch and Tylenol      #GI/Bowel Mgmt:/ Constipation  - finally had BM today with relief  - monitor on Miralax, Senna    -FEN : Monitor Na levels    - Diet: DASH/TLC

## 2024-02-10 LAB
ANION GAP SERPL CALC-SCNC: 11 MMOL/L — SIGNIFICANT CHANGE UP (ref 7–14)
BASOPHILS # BLD AUTO: 0.03 K/UL — SIGNIFICANT CHANGE UP (ref 0–0.2)
BASOPHILS NFR BLD AUTO: 0.5 % — SIGNIFICANT CHANGE UP (ref 0–1)
BUN SERPL-MCNC: 21 MG/DL — HIGH (ref 10–20)
CALCIUM SERPL-MCNC: 9.2 MG/DL — SIGNIFICANT CHANGE UP (ref 8.4–10.5)
CHLORIDE SERPL-SCNC: 105 MMOL/L — SIGNIFICANT CHANGE UP (ref 98–110)
CO2 SERPL-SCNC: 23 MMOL/L — SIGNIFICANT CHANGE UP (ref 17–32)
CREAT SERPL-MCNC: 0.7 MG/DL — SIGNIFICANT CHANGE UP (ref 0.7–1.5)
EGFR: 86 ML/MIN/1.73M2 — SIGNIFICANT CHANGE UP
EOSINOPHIL # BLD AUTO: 0.07 K/UL — SIGNIFICANT CHANGE UP (ref 0–0.7)
EOSINOPHIL NFR BLD AUTO: 1.2 % — SIGNIFICANT CHANGE UP (ref 0–8)
FLUAV AG NPH QL: SIGNIFICANT CHANGE UP
FLUBV AG NPH QL: SIGNIFICANT CHANGE UP
GLUCOSE SERPL-MCNC: 98 MG/DL — SIGNIFICANT CHANGE UP (ref 70–99)
HCT VFR BLD CALC: 35.8 % — LOW (ref 37–47)
HGB BLD-MCNC: 12.2 G/DL — SIGNIFICANT CHANGE UP (ref 12–16)
IMM GRANULOCYTES NFR BLD AUTO: 0.5 % — HIGH (ref 0.1–0.3)
LYMPHOCYTES # BLD AUTO: 1.4 K/UL — SIGNIFICANT CHANGE UP (ref 1.2–3.4)
LYMPHOCYTES # BLD AUTO: 23.5 % — SIGNIFICANT CHANGE UP (ref 20.5–51.1)
MAGNESIUM SERPL-MCNC: 1.8 MG/DL — SIGNIFICANT CHANGE UP (ref 1.8–2.4)
MCHC RBC-ENTMCNC: 31 PG — SIGNIFICANT CHANGE UP (ref 27–31)
MCHC RBC-ENTMCNC: 34.1 G/DL — SIGNIFICANT CHANGE UP (ref 32–37)
MCV RBC AUTO: 90.9 FL — SIGNIFICANT CHANGE UP (ref 81–99)
MONOCYTES # BLD AUTO: 1.07 K/UL — HIGH (ref 0.1–0.6)
MONOCYTES NFR BLD AUTO: 18 % — HIGH (ref 1.7–9.3)
NEUTROPHILS # BLD AUTO: 3.35 K/UL — SIGNIFICANT CHANGE UP (ref 1.4–6.5)
NEUTROPHILS NFR BLD AUTO: 56.3 % — SIGNIFICANT CHANGE UP (ref 42.2–75.2)
NRBC # BLD: 0 /100 WBCS — SIGNIFICANT CHANGE UP (ref 0–0)
PLATELET # BLD AUTO: 200 K/UL — SIGNIFICANT CHANGE UP (ref 130–400)
PMV BLD: 11.1 FL — HIGH (ref 7.4–10.4)
POTASSIUM SERPL-MCNC: 3.7 MMOL/L — SIGNIFICANT CHANGE UP (ref 3.5–5)
POTASSIUM SERPL-SCNC: 3.7 MMOL/L — SIGNIFICANT CHANGE UP (ref 3.5–5)
RBC # BLD: 3.94 M/UL — LOW (ref 4.2–5.4)
RBC # FLD: 13 % — SIGNIFICANT CHANGE UP (ref 11.5–14.5)
RSV RNA NPH QL NAA+NON-PROBE: SIGNIFICANT CHANGE UP
SARS-COV-2 RNA SPEC QL NAA+PROBE: DETECTED
SODIUM SERPL-SCNC: 139 MMOL/L — SIGNIFICANT CHANGE UP (ref 135–146)
WBC # BLD: 5.95 K/UL — SIGNIFICANT CHANGE UP (ref 4.8–10.8)
WBC # FLD AUTO: 5.95 K/UL — SIGNIFICANT CHANGE UP (ref 4.8–10.8)

## 2024-02-10 RX ADMIN — FAMOTIDINE 20 MILLIGRAM(S): 10 INJECTION INTRAVENOUS at 06:28

## 2024-02-10 RX ADMIN — FAMOTIDINE 20 MILLIGRAM(S): 10 INJECTION INTRAVENOUS at 17:26

## 2024-02-10 RX ADMIN — ATORVASTATIN CALCIUM 80 MILLIGRAM(S): 80 TABLET, FILM COATED ORAL at 22:03

## 2024-02-10 RX ADMIN — ENOXAPARIN SODIUM 40 MILLIGRAM(S): 100 INJECTION SUBCUTANEOUS at 22:03

## 2024-02-10 RX ADMIN — LISINOPRIL 5 MILLIGRAM(S): 2.5 TABLET ORAL at 22:03

## 2024-02-10 RX ADMIN — AMLODIPINE BESYLATE 5 MILLIGRAM(S): 2.5 TABLET ORAL at 06:29

## 2024-02-10 RX ADMIN — CARVEDILOL PHOSPHATE 6.25 MILLIGRAM(S): 80 CAPSULE, EXTENDED RELEASE ORAL at 06:29

## 2024-02-10 RX ADMIN — Medication 81 MILLIGRAM(S): at 11:47

## 2024-02-10 RX ADMIN — CLOPIDOGREL BISULFATE 75 MILLIGRAM(S): 75 TABLET, FILM COATED ORAL at 11:45

## 2024-02-10 RX ADMIN — CARVEDILOL PHOSPHATE 6.25 MILLIGRAM(S): 80 CAPSULE, EXTENDED RELEASE ORAL at 17:26

## 2024-02-10 NOTE — PROGRESS NOTE ADULT - SUBJECTIVE AND OBJECTIVE BOX
Patient is a 83y old  Female who presents with a chief complaint of Rehab of left cerebellar stroke with left hemiataxia.(nondominant) (07 Feb 2024 14:25)    HPI:  This is a 84 y/o RHD F with PMHx of HLD, HTN, L knee OA presenting for unsteady gait.    Patient states she was in her normal state of health a day prior to presentation. She was outside having lunch with her friend when she started to notice she was off balance that persists after coming back home. For worsening unsteady gait and imbalance she decided  to seek medical help the next day. She admits to vomiting, but denies any HA, diplopia, N, dizziness, numbness/tingling, speech difficulties. In the ED, vitals were significant for /72. CTH notable for L cerebellar subacute infarct. 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 NIHSS on admission was 3. Patient was admitted to stroke unit for further work up.     On 2/6, the patient's Bp was elevated requiring 15mg of hydralazine overnight. CTH was repeated for possible worsening dysmetria, end gaze nystagmus, and NIHSS increase from 0 to 2 (for upper and lower left extremity dysmetria), urgent CTH showed no intracranial hemorrhage or midline shift. Left cerebellar hypodense lesion consistent with evolving subacute infarct.    The patient was evaluated by the PM&R team once medically stable. The patient was found to have functional limitation in terms of muscle strength, endurance, physical mobility, and ability to carry out activities of daily living (self care, transfers, and ambulation). The patient was started on a course of bedside therapy, the pt is motivated and able to start 3 hours of therapy  daily for 6-7 days a week. With PT/OT,  pt required Min A w transfers, Mod A w UB dressing, Mod A w LB dressing. Amb 15' using RW requiring Mod A. SLP evaluated the patient and recommended regular with thin liquids. The patient was deemed to be a good candidate for admission for acute inpatient rehab. The patient was admitted to acute inpatient rehab on 2/7/24.    (07 Feb 2024 14:25)    I examined the patient and reviewed the chart. There have been no significant changes since my history and physical except where documented below.    TODAY'S SUBJECTIVE & REVIEW OF SYMPTOMS:  Patient was seen and examined at bedside this morning  No acute overnight events reported   Reported itchy throat, denies any CP, SOB, N/V, fatigue, decreased appetite.   Patient is participating/tolerating therapies well.  Having regular BM and voiding freely.     Vital signs reviewed and stable, no fever  Labs reviewed, no WBC  Respiratory panel came back positive for COVID  On isolation precautions     Constitutional:    [x ] WNL           [   ] poor appetite   [   ] insomnia   [  ] tired   Cardio:                [ x ] WNL           [   ] CP   [  ] BARBOSA   [   ] palpitations               Resp:                   [  x ] WNL           [   ] SOB   [   ] cough   [   ] wheezing   GI:                        [ x  ] WNL           [   ] constipation  [   ] diarrhea   [   ] abdominal pain   [   ] nausea   [   ] emesis                                :                      [   ] WNL           [   ] FRANCES  [   ] dysuria   [   ] difficulty voiding   []: Other:    Endo:                   [ x  ] WNL          [   ] polyuria   [   ] temperature intolerance                 Skin:                     [ x  ] WNL          [   ] pain   [   ] wound  [   ] rash   MSK:                    [  ] WNL          [  ] muscle pain   [ x ] joint pain/ stiffness-b/l knee pain L>R   [   ] muscle tenderness   [   ] swelling   Neuro:                 [   ] WNL          [   ] HA   [   ] change in vision   [   ] tremor   [x  ] LSW weakness  [   ]dysphagia            Cognitive:           [ x ] WNL           [  ]confusion      Psych:                  [  x ] WNL           [   ] hallucinations   [   ]agitation   [   ] delusion   [   ]depression    PHYSICAL EXAM    Vital Signs Last 24 Hrs  T(C): 37.3 (10 Feb 2024 13:08), Max: 37.6 (10 Feb 2024 04:43)  T(F): 99.2 (10 Feb 2024 13:08), Max: 99.6 (10 Feb 2024 04:43)  HR: 72 (10 Feb 2024 13:08) (70 - 72)  BP: 142/62 (10 Feb 2024 13:08) (90/50 - 146/65)  BP(mean): 93 (09 Feb 2024 20:04) (93 - 93)  RR: 18 (10 Feb 2024 13:08) (18 - 18)    General:[ x  ] NAD, Resting Comfortable   [   ] other:                                HEENT: [ x  ] NC/AT, EOMI,  Normal Conjunctivae,   [   ] other:   Cardio: [  x ] RRR, no murmur,   [   ] other:                              Pulm: [ x  ] No Respiratory Distress,  Lungs CTAB,   [   ] other:             Abdomen: [ x  ]ND/NT, Soft,   [   ] other:    : [ x  ] NO FRANCES CATHETER, [   ] FRANCES CATHETER present [ x ] other: PureWick  MSK: [ x ] No joint swelling, Full ROM   [   ] other:                                         Ext: [x ]No C/C/E, No calf tenderness,   [ ]other:    Skin: [ x  ]intact,   [   ] other: wound                                               Neurological Examination:  Cognitive: [  x  ] AAO x 3,   [  ]  other:     Attention:  [ x  ] intact   [    ]  other:           Concentration: [ x  ] intact   [    ]  other:   Language: [  ] intact  [ x ] Able to name 3 objects and describe function          Memory: [   ] intact,    [ x ]  other: Impaired, able to recall 1/3 objects   Mood/Affect: [   ] wnl  [  x  ]  other: emotional labile (crying)                                                                          Communication: [ x  ]Fluent, no dysarthria [ ] other:   CN II - XII:  [    ] intact,  [  x  ] other: horizontal  nystagmus noted  with fixation                                                                                        Motor:   RUE: 5/5: shoulder abduction, elbow flexion, elbow extension,  finger flexion  LUE: 4+/5: shoulder abduction, 4+/5 elbow flexion, elbow extension, 4+/5 finger flexion  RLE: 5/5 hip flexion, knee ext, knee flex, ankle dorsiflexion, and ankle  plantarflexion  LLE: 4+/5: hip flexion, knee ext, knee flex, ankle dorsiflexion, and ankle  plantarflexion    Tone: [  x ] wnl,   [    ]  other:  DTRs: [ x ]symmetric, [   ] other:  Coordination:   [    ] intact,   [ x ] other: +FTN, and heel to shin on the L, + L dysdiadokinesia / dysmetria.                                                                     Sensory: [ x  ] Intact to light touch,   [  ] other:    No clonus; No spasticity    MEDICATIONS  (STANDING):  amLODIPine   Tablet 5 milliGRAM(s) Oral daily  aspirin enteric coated 81 milliGRAM(s) Oral daily  atorvastatin 80 milliGRAM(s) Oral at bedtime  carvedilol 6.25 milliGRAM(s) Oral every 12 hours  clopidogrel Tablet 75 milliGRAM(s) Oral daily  dextrose 5% + sodium chloride 0.45%. 1000 milliLiter(s) (75 mL/Hr) IV Continuous <Continuous>  enoxaparin Injectable 40 milliGRAM(s) SubCutaneous every 24 hours  famotidine    Tablet 20 milliGRAM(s) Oral two times a day  lisinopril 5 milliGRAM(s) Oral at bedtime  polyethylene glycol 3350 17 Gram(s) Oral daily  VOLTAREN  GEL  1%  TOPICAL OINTMENT 1 Application(s) 1 Application(s) Topical three times a day    MEDICATIONS  (PRN):  acetaminophen     Tablet .. 650 milliGRAM(s) Oral every 6 hours PRN Temp greater or equal to 38C (100.4F), Mild Pain (1 - 3)  melatonin 3 milliGRAM(s) Oral at bedtime PRN Insomnia  senna 2 Tablet(s) Oral at bedtime PRN Constipation  traMADol 25 milliGRAM(s) Oral every 6 hours PRN severe pain shoulders      RECENT LABS/IMAGING                          12.2   5.95  )-----------( 200      ( 10 Feb 2024 06:52 )             35.8     02-10    139  |  105  |  21<H>  ----------------------------<  98  3.7   |  23  |  0.7    Ca    9.2      10 Feb 2024 06:52  Mg     1.8     02-10        Urinalysis Basic - ( 10 Feb 2024 06:52 )    Color: x / Appearance: x / SG: x / pH: x  Gluc: 98 mg/dL / Ketone: x  / Bili: x / Urobili: x   Blood: x / Protein: x / Nitrite: x   Leuk Esterase: x / RBC: x / WBC x   Sq Epi: x / Non Sq Epi: x / Bacteria: x

## 2024-02-10 NOTE — PROGRESS NOTE ADULT - ASSESSMENT
Assessment:  This is a 84 y/o RHD F with PMHx of HLD, HTN, L knee OA presenting for worsening unsteady gait. In the ED, vitals were significant for /72. CTH notable for L cerebellar subacute infarct. 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 NIHSS on admission was 3 for ataxia L sided and LLE. Patient was admitted to stroke unit for further work up. On 2/6, the patient's Bp was elevated requiring 15mg of hydralazine overnight. CTH was repeated for possible worsening dysmetria, end gaze nystagmus, and NIHSS increase from 0 to 2 (for upper and lower left extremity dysmetria), urgent CTH showed no intracranial hemorrhage or midline shift. Left cerebellar hypodense lesion consistent with evolving subacute infarct.    The patient was evaluated by the PM&R team once medically stable. The patient was found to have functional limitation in terms of muscle strength, endurance, physical mobility, and ability to carry out activities of daily living (self care, transfers, and ambulation). The patient was started on a course of bedside therapy, the pt is motivated and able to start 3 hours of therapy  daily for 6-7 days a week. With PT/OT, pt required Min A w transfers, Mod A w UB dressing, Mod A w LB dressing. Amb 15' using RW requiring Mod A.  SLP evaluated the patient and recommended regular with thin liquids.The patient was deemed to be a good candidate for admission for acute inpatient rehab. The patient was admitted to acute inpatient rehab on 2/7/24.     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 Normal EF  NIHSS+2= Ataxia in 2 limbs on admission to rehab  - S/p Loaded Aspirin 325mg and Plavix 300mg  - C/w Aspirin 81mg and Plavix 75mg daily for 90 days  - EP consulted. ILR to be placed likely on Monday  - HOB at 45 degrees, aspiration precautions  - PT/OT/ST/ neuropsych eval    #HTN  - SBP goal: 110-150  - c/w Carvedilol 20 mg extended release daily  - c/w Hctz 25mg daily  - c/w amlodipine 10mg once daily   -c/w lisinopril 5 mg at bedtime   -Will monitor and adjust accordingly     #HLD  - c/w lipitor 80mg daily    #SARS-Cov-2 positive 2/10  -No fever, no WBC count  -Isolation precautions  -Monitor     #Bilat knee OA/ left knee chronic pain  - no NSAIDS for now  - Tylenol PRN for pain  - Modalities   - consider Voltaren cream and/ or Lidoderm patch and Tylenol    #GI/Bowel Mgmt:/ Constipation  - finally had BM today with relief  - monitor on Miralax, Senna    -FEN : Monitor Na levels    - Diet: DASH/TLC    Precautions / PROPHYLAXIS:      - Falls      - DVT prophylaxis: Lovenox

## 2024-02-10 NOTE — PROGRESS NOTE ADULT - ATTENDING COMMENTS
Patient seen and examined with the resident. We discussed the case. I have directed the care. I edited the note. The patient requires acute rehab with 3 hours of daily therapies at least 5 out of 7 days and close physiatry follow up.

## 2024-02-11 LAB — GLUCOSE BLDC GLUCOMTR-MCNC: 96 MG/DL — SIGNIFICANT CHANGE UP (ref 70–99)

## 2024-02-11 RX ADMIN — ENOXAPARIN SODIUM 40 MILLIGRAM(S): 100 INJECTION SUBCUTANEOUS at 22:01

## 2024-02-11 RX ADMIN — Medication 81 MILLIGRAM(S): at 12:40

## 2024-02-11 RX ADMIN — CARVEDILOL PHOSPHATE 6.25 MILLIGRAM(S): 80 CAPSULE, EXTENDED RELEASE ORAL at 18:18

## 2024-02-11 RX ADMIN — FAMOTIDINE 20 MILLIGRAM(S): 10 INJECTION INTRAVENOUS at 18:18

## 2024-02-11 RX ADMIN — AMLODIPINE BESYLATE 5 MILLIGRAM(S): 2.5 TABLET ORAL at 05:28

## 2024-02-11 RX ADMIN — LISINOPRIL 5 MILLIGRAM(S): 2.5 TABLET ORAL at 22:00

## 2024-02-11 RX ADMIN — SODIUM CHLORIDE 75 MILLILITER(S): 9 INJECTION, SOLUTION INTRAVENOUS at 10:55

## 2024-02-11 RX ADMIN — FAMOTIDINE 20 MILLIGRAM(S): 10 INJECTION INTRAVENOUS at 05:28

## 2024-02-11 RX ADMIN — ATORVASTATIN CALCIUM 80 MILLIGRAM(S): 80 TABLET, FILM COATED ORAL at 22:00

## 2024-02-11 RX ADMIN — CLOPIDOGREL BISULFATE 75 MILLIGRAM(S): 75 TABLET, FILM COATED ORAL at 12:40

## 2024-02-11 NOTE — PROGRESS NOTE ADULT - ASSESSMENT
Assessment:  This is a 84 y/o RHD F with PMHx of HLD, HTN, L knee OA presenting for worsening unsteady gait. In the ED, vitals were significant for /72. CTH notable for L cerebellar subacute infarct. 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 NIHSS on admission was 3 for ataxia L sided and LLE. Patient was admitted to stroke unit for further work up. On 2/6, the patient's Bp was elevated requiring 15mg of hydralazine overnight. CTH was repeated for possible worsening dysmetria, end gaze nystagmus, and NIHSS increase from 0 to 2 (for upper and lower left extremity dysmetria), urgent CTH showed no intracranial hemorrhage or midline shift. Left cerebellar hypodense lesion consistent with evolving subacute infarct.    The patient was evaluated by the PM&R team once medically stable. The patient was found to have functional limitation in terms of muscle strength, endurance, physical mobility, and ability to carry out activities of daily living (self care, transfers, and ambulation). The patient was started on a course of bedside therapy, the pt is motivated and able to start 3 hours of therapy  daily for 6-7 days a week. With PT/OT, pt required Min A w transfers, Mod A w UB dressing, Mod A w LB dressing. Amb 15' using RW requiring Mod A.  SLP evaluated the patient and recommended regular with thin liquids.The patient was deemed to be a good candidate for admission for acute inpatient rehab. The patient was admitted to acute inpatient rehab on 2/7/24.     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 Normal EF  NIHSS+2= Ataxia in 2 limbs on admission to rehab  - S/p Loaded Aspirin 325mg and Plavix 300mg  - C/w Aspirin 81mg and Plavix 75mg daily for 90 days  - EP consulted. ILR to be placed likely on Monday  - HOB at 45 degrees, aspiration precautions  - PT/OT/ST/ neuropsych eval    #HTN  - SBP goal: 110-150  - c/w Carvedilol 20 mg extended release daily  - c/w Hctz 25mg daily  - c/w amlodipine 10mg once daily   -c/w lisinopril 5 mg at bedtime   -Will monitor and adjust accordingly     #HLD  - c/w lipitor 80mg daily    #SARS-Cov-2 positive 2/10  -No fever, no WBC count  -Isolation precautions  -Monitor   - IVF    Diarrhea r/o C. diff (2/11)  -3 loose stool episodes in AM 2/11  -C diff test ordered  -bowel regimen held    #Bilat knee OA/ left knee chronic pain  - no NSAIDS for now  - Tylenol PRN for pain  - Modalities   - consider Voltaren cream and/ or Lidoderm patch and Tylenol    #GI/Bowel Mgmt:/ Constipation  - finally had BM today with relief  - Miralax, Senna (held 2/2 diarrhea)     -FEN : Monitor Na levels    - Diet: DASH/TLC    Precautions / PROPHYLAXIS:      - Falls      - DVT prophylaxis: Lovenox        Assessment:  This is a 84 y/o right-handed F with PMHx of HLD, HTN, L knee OA presenting for worsening unsteady gait. In the ED, vitals were significant for /72. CTH notable for L cerebellar subacute infarct. 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 NIHSS on admission was 3 for ataxia L sided and LLE. Patient was admitted to stroke unit for further work up. On 2/6, the patient's Bp was elevated requiring 15mg of hydralazine overnight. CTH was repeated for possible worsening dysmetria, end gaze nystagmus, and NIHSS increase from 0 to 2 (for upper and lower left extremity dysmetria), urgent CTH showed no intracranial hemorrhage or midline shift. Left cerebellar hypodense lesion consistent with evolving subacute infarct.    The patient was evaluated by the PM&R team once medically stable. The patient was found to have functional limitation in terms of muscle strength, endurance, physical mobility, and ability to carry out activities of daily living (self care, transfers, and ambulation). The patient was started on a course of bedside therapy, the pt is motivated and able to start 3 hours of therapy  daily for 6-7 days a week. With PT/OT, pt required Min A w transfers, Mod A w UB dressing, Mod A w LB dressing. Amb 15' using RW requiring Mod A.  SLP evaluated the patient and recommended regular with thin liquids.The patient was deemed to be a good candidate for admission for acute inpatient rehab. The patient was admitted to acute inpatient rehab on 2/7/24.     Plan:  #Rehab for left cerebellar stroke with left hemiataxia.(nondominanat), and impaired cognition/ memory  likely due to small vessel dz vs A-A emboli  MRI brain w/out: Acute infarcts involving the left cerebellar hemisphere without hemorrhagic transformation  HbA1c 5.8 , TSH 2.10 and   TTE Mildly enlarged L atria. Normal EF  NIHSS+2= Ataxia in 2 limbs on admission to rehab  - S/p Loaded Aspirin 325mg and Plavix 300mg  - C/w Aspirin 81mg and Plavix 75mg daily for 90 days  - EP consulted. ILR to be placed likely on Monday  - HOB at 45 degrees, aspiration precautions  - PT/OT/ST/ neuropsych eval    #HTN  - SBP goal: 110-150  - c/w carvedilol 20 mg extended release daily  - c/w HCTZ 25 mg daily  - c/w amlodipine 10 mg once daily   -c/w lisinopril 5 mg at bedtime   -Will monitor and adjust accordingly     #HLD  - c/w lipitor 80 mg daily    #SARS-Cov-2 positive 2/10/2024  -No fever, no WBC count  -Isolation precautions  -Monitor   - IVF    Diarrhea r/o C. diff (2/11)  -3 loose stool episodes in AM 2/11/2024  -C diff test ordered  -bowel regimen held    #Bilat knee OA/ left knee chronic pain  - no NSAIDS for now  - Tylenol PRN for pain  - Modalities   - consider Voltaren cream and/ or lidoderm patch and Tylenol    #GI/Bowel Mgmt:/ Constipation  - finally had BM today with relief  - Miralax, senna (held 2/2 diarrhea)     -FEN : Monitor Na levels    - Diet: DASH/TLC    Precautions / PROPHYLAXIS:      - Falls      - DVT prophylaxis: Lovenox

## 2024-02-11 NOTE — PROGRESS NOTE ADULT - SUBJECTIVE AND OBJECTIVE BOX
Patient is a 83y old  Female who presents with a chief complaint of Rehab of left cerebellar stroke with left hemiataxia.(nondominant) (07 Feb 2024 14:25)    HPI:  This is a 82 y/o RHD F with PMHx of HLD, HTN, L knee OA presenting for unsteady gait.    Patient states she was in her normal state of health a day prior to presentation. She was outside having lunch with her friend when she started to notice she was off balance that persists after coming back home. For worsening unsteady gait and imbalance she decided  to seek medical help the next day. She admits to vomiting, but denies any HA, diplopia, N, dizziness, numbness/tingling, speech difficulties. In the ED, vitals were significant for /72. CTH notable for L cerebellar subacute infarct. 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 NIHSS on admission was 3. Patient was admitted to stroke unit for further work up.     On 2/6, the patient's Bp was elevated requiring 15mg of hydralazine overnight. CTH was repeated for possible worsening dysmetria, end gaze nystagmus, and NIHSS increase from 0 to 2 (for upper and lower left extremity dysmetria), urgent CTH showed no intracranial hemorrhage or midline shift. Left cerebellar hypodense lesion consistent with evolving subacute infarct.    The patient was evaluated by the PM&R team once medically stable. The patient was found to have functional limitation in terms of muscle strength, endurance, physical mobility, and ability to carry out activities of daily living (self care, transfers, and ambulation). The patient was started on a course of bedside therapy, the pt is motivated and able to start 3 hours of therapy  daily for 6-7 days a week. With PT/OT,  pt required Min A w transfers, Mod A w UB dressing, Mod A w LB dressing. Amb 15' using RW requiring Mod A. SLP evaluated the patient and recommended regular with thin liquids. The patient was deemed to be a good candidate for admission for acute inpatient rehab. The patient was admitted to acute inpatient rehab on 2/7/24.    (07 Feb 2024 14:25)    I examined the patient and reviewed the chart. There have been no significant changes since my history and physical except where documented below.    TODAY'S SUBJECTIVE & REVIEW OF SYMPTOMS:    No acute events overnight. Patient was seen and examined at bedside this morning. Continues to have itchy throat. Denies any fever, chills, chest pain, N/V, or abd pain. Endorses multiple loose stool episodes today.  Patient is participating/tolerating therapies well. Having regular BM and voiding freely. Vital signs reviewed and stable. Patient COVID positive and placed on isolation.       Constitutional:    [x ] WNL           [   ] poor appetite   [   ] insomnia   [  ] tired   Cardio:                [ x ] WNL           [   ] CP   [  ] BARBOSA   [   ] palpitations               Resp:                   [  x ] WNL           [   ] SOB   [   ] cough   [   ] wheezing   GI:                        [ x  ] WNL           [   ] constipation  [   ] diarrhea   [   ] abdominal pain   [   ] nausea   [   ] emesis                                :                      [   ] WNL           [   ] FRANCES  [   ] dysuria   [   ] difficulty voiding   []: Other:    Endo:                   [ x  ] WNL          [   ] polyuria   [   ] temperature intolerance                 Skin:                     [ x  ] WNL          [   ] pain   [   ] wound  [   ] rash   MSK:                    [  ] WNL          [  ] muscle pain   [ x ] joint pain/ stiffness-b/l knee pain L>R   [   ] muscle tenderness   [   ] swelling   Neuro:                 [   ] WNL          [   ] HA   [   ] change in vision   [   ] tremor   [x  ] LSW weakness  [   ]dysphagia            Cognitive:           [ x ] WNL           [  ]confusion      Psych:                  [  x ] WNL           [   ] hallucinations   [   ]agitation   [   ] delusion   [   ]depression    PHYSICAL EXAM    Vital Signs Last 24 Hrs  T(C): 37.3 (11 Feb 2024 12:36), Max: 37.7 (10 Feb 2024 20:15)  T(F): 99.2 (11 Feb 2024 12:36), Max: 99.8 (10 Feb 2024 20:15)  HR: 64 (11 Feb 2024 12:36) (61 - 78)  BP: 126/62 (11 Feb 2024 12:36) (121/58 - 169/73)  BP(mean): 95 (10 Feb 2024 17:20) (95 - 95)  RR: 18 (11 Feb 2024 12:36) (18 - 18)  SpO2: --    General:[ x  ] NAD, Resting Comfortable   [   ] other:                                HEENT: [ x  ] NC/AT, EOMI,  Normal Conjunctivae,   [   ] other:   Cardio: [  x ] RRR, no murmur,   [   ] other:                              Pulm: [ x  ] No Respiratory Distress,  Lungs CTAB,   [   ] other:             Abdomen: [ x  ]ND/NT, Soft,   [   ] other:    : [ x  ] NO FRANCES CATHETER, [   ] FRANCES CATHETER present [ x ] other: PureWick  MSK: [ x ] No joint swelling, Full ROM   [   ] other:                                         Ext: [x ]No C/C/E, No calf tenderness,   [ ]other:    Skin: [ x  ]intact,   [   ] other: wound                                               Neurological Examination:  Cognitive: [  x  ] AAO x 3,   [  ]  other:     Attention:  [ x  ] intact   [    ]  other:           Concentration: [ x  ] intact   [    ]  other:   Language: [  ] intact  [ x ] Able to name 3 objects and describe function          Memory: [   ] intact,    [ x ]  other: Impaired, able to recall 1/3 objects   Mood/Affect: [   ] wnl  [  x  ]  other: emotional labile (crying)                                                                          Communication: [ x  ]Fluent, no dysarthria [ ] other:   CN II - XII:  [    ] intact,  [  x  ] other: horizontal  nystagmus noted  with fixation                                                                                        Motor:   RUE: 5/5: shoulder abduction, elbow flexion, elbow extension,  finger flexion  LUE: 4+/5: shoulder abduction, 4+/5 elbow flexion, elbow extension, 4+/5 finger flexion  RLE: 5/5 hip flexion, knee ext, knee flex, ankle dorsiflexion, and ankle  plantarflexion  LLE: 4+/5: hip flexion, knee ext, knee flex, ankle dorsiflexion, and ankle  plantarflexion    Tone: [  x ] wnl,   [    ]  other:  DTRs: [ x ]symmetric, [   ] other:  Coordination:   [    ] intact,   [ x ] other: +FTN, and heel to shin on the L, + L dysdiadokinesia / dysmetria.                                                                     Sensory: [ x  ] Intact to light touch,   [  ] other:    No clonus; No spasticity    MEDICATIONS  (STANDING):  amLODIPine   Tablet 5 milliGRAM(s) Oral daily  aspirin enteric coated 81 milliGRAM(s) Oral daily  atorvastatin 80 milliGRAM(s) Oral at bedtime  carvedilol 6.25 milliGRAM(s) Oral every 12 hours  clopidogrel Tablet 75 milliGRAM(s) Oral daily  dextrose 5% + sodium chloride 0.45%. 1000 milliLiter(s) (75 mL/Hr) IV Continuous <Continuous>  enoxaparin Injectable 40 milliGRAM(s) SubCutaneous every 24 hours  famotidine    Tablet 20 milliGRAM(s) Oral two times a day  lisinopril 5 milliGRAM(s) Oral at bedtime  polyethylene glycol 3350 17 Gram(s) Oral daily  VOLTAREN  GEL  1%  TOPICAL OINTMENT 1 Application(s) 1 Application(s) Topical three times a day    MEDICATIONS  (PRN):  acetaminophen     Tablet .. 650 milliGRAM(s) Oral every 6 hours PRN Temp greater or equal to 38C (100.4F), Mild Pain (1 - 3)  melatonin 3 milliGRAM(s) Oral at bedtime PRN Insomnia  senna 2 Tablet(s) Oral at bedtime PRN Constipation  traMADol 25 milliGRAM(s) Oral every 6 hours PRN severe pain shoulders      RECENT LABS/IMAGING                          12.2   5.95  )-----------( 200      ( 10 Feb 2024 06:52 )             35.8     02-10    139  |  105  |  21<H>  ----------------------------<  98  3.7   |  23  |  0.7    Ca    9.2      10 Feb 2024 06:52  Mg     1.8     02-10        Urinalysis Basic - ( 10 Feb 2024 06:52 )    Color: x / Appearance: x / SG: x / pH: x  Gluc: 98 mg/dL / Ketone: x  / Bili: x / Urobili: x   Blood: x / Protein: x / Nitrite: x   Leuk Esterase: x / RBC: x / WBC x   Sq Epi: x / Non Sq Epi: x / Bacteria: x                  Patient is an 83 y old female who presents with a chief complaint of Rehab of left cerebellar stroke with left hemiataxia.(nondominant) (07 Feb 2024 14:25)    HPI:  This is a 82 y/o RHD F with PMHx of HLD, HTN, L knee OA presenting for unsteady gait.    Patient states she was in her normal state of health a day prior to presentation. She was outside having lunch with her friend when she started to notice she was off balance that persists after coming back home. For worsening unsteady gait and imbalance she decided  to seek medical help the next day. She admits to vomiting, but denies any HA, diplopia, N, dizziness, numbness/tingling, speech difficulties. In the ED, vitals were significant for /72. CTH notable for L cerebellar subacute infarct. 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 NIHSS on admission was 3. Patient was admitted to stroke unit for further work up.     On 2/6/2204, the patient's Bp was elevated requiring 15mg of hydralazine overnight. CTH was repeated for possible worsening dysmetria, end gaze nystagmus, and NIHSS increase from 0 to 2 (for upper and lower left extremity dysmetria), urgent CTH showed no intracranial hemorrhage or midline shift. Left cerebellar hypodense lesion consistent with evolving subacute infarct.    The patient was evaluated by the PM&R team once medically stable. The patient was found to have functional limitation in terms of muscle strength, endurance, physical mobility, and ability to carry out activities of daily living (self care, transfers, and ambulation). The patient was started on a course of bedside therapy, the pt is motivated and able to start 3 hours of therapy  daily for 6-7 days a week. With PT/OT,  pt required Min A w transfers, Mod A w UB dressing, Mod A w LB dressing. Amb 15' using RW requiring Mod A. SLP evaluated the patient and recommended regular with thin liquids. The patient was deemed to be a good candidate for admission for acute inpatient rehab. The patient was admitted to acute inpatient rehab on 2/7/24.    (07 Feb 2024 14:25)    I examined the patient and reviewed the chart. There have been no significant changes since the history and physical except where documented below.    TODAY'S SUBJECTIVE & REVIEW OF SYMPTOMS:    No acute events overnight. Patient was seen and examined at bedside this morning. Continues to have itchy throat. Denies any fever, chills, chest pain, N/V, or abd pain. Endorses multiple loose stool episodes today.  Patient is participating/tolerating therapies well. Having regular BM and voiding freely. Vital signs reviewed and stable. Patient COVID-19 positive and placed on isolation.       Constitutional:    [x ] WNL           [   ] poor appetite   [   ] insomnia   [  ] tired   Cardio:                [ x ] WNL           [   ] CP   [  ] BARBOSA   [   ] palpitations               Resp:                   [  x ] WNL           [   ] SOB   [   ] cough   [   ] wheezing   GI:                        [ x  ] WNL           [   ] constipation  [   ] diarrhea   [   ] abdominal pain   [   ] nausea   [   ] emesis                                :                      [   ] WNL           [   ] FRANCES  [   ] dysuria   [   ] difficulty voiding   []: Other:    Endo:                   [ x  ] WNL          [   ] polyuria   [   ] temperature intolerance                 Skin:                     [ x  ] WNL          [   ] pain   [   ] wound  [   ] rash   MSK:                    [  ] WNL          [  ] muscle pain   [ x ] joint pain/ stiffness-b/l knee pain L>R   [   ] muscle tenderness   [   ] swelling   Neuro:                 [   ] WNL          [   ] HA   [   ] change in vision   [   ] tremor   [x  ] LSW weakness  [   ]dysphagia            Cognitive:           [ x ] WNL           [  ]confusion      Psych:                  [  x ] WNL           [   ] hallucinations   [   ]agitation   [   ] delusion   [   ]depression    PHYSICAL EXAM    Vital Signs Last 24 Hrs  T(C): 37.3 (11 Feb 2024 12:36), Max: 37.7 (10 Feb 2024 20:15)  T(F): 99.2 (11 Feb 2024 12:36), Max: 99.8 (10 Feb 2024 20:15)  HR: 64 (11 Feb 2024 12:36) (61 - 78)  BP: 126/62 (11 Feb 2024 12:36) (121/58 - 169/73)  BP(mean): 95 (10 Feb 2024 17:20) (95 - 95)  RR: 18 (11 Feb 2024 12:36) (18 - 18)  SpO2: --    General:[ x  ] NAD, Resting Comfortable   [   ] other:                                HEENT: [ x  ] NC/AT, EOMI,  Normal Conjunctivae,   [   ] other:   Cardio: [  x ] RRR, no murmur,   [   ] other:                              Pulm: [ x  ] No Respiratory Distress,  Lungs CTAB,   [   ] other:             Abdomen: [ x  ]ND/NT, Soft,   [   ] other:    : [ x  ] NO FRANCES CATHETER, [   ] FRANCES CATHETER present [ x ] other: PureWick  MSK: [ x ] No joint swelling, Full ROM   [   ] other:                                         Ext: [x ]No C/C/E, No calf tenderness,   [ ]other:    Skin: [ x  ]intact,   [   ] other: wound                                               Neurological Examination:  Cognitive: [  x  ] AAO x 3,   [  ]  other:     Attention:  [ x  ] intact   [    ]  other:           Concentration: [ x  ] intact   [    ]  other:   Language: [  ] intact  [ x ] Able to name 3 objects and describe function          Memory: [   ] intact,    [ x ]  other: Impaired, able to recall 1/3 objects   Mood/Affect: [   ] wnl  [  x  ]  other: emotional labile (crying)                                                                          Communication: [ x  ]Fluent, no dysarthria [ ] other:   CN II - XII:  [    ] intact,  [  x  ] other: horizontal  nystagmus noted  with fixation                                                                                        Motor:   RUE: 5/5: shoulder abduction, elbow flexion, elbow extension,  finger flexion  LUE: 4+/5: shoulder abduction, 4+/5 elbow flexion, elbow extension, 4+/5 finger flexion  RLE: 5/5 hip flexion, knee ext, knee flex, ankle dorsiflexion, and ankle  plantarflexion  LLE: 4+/5: hip flexion, knee ext, knee flex, ankle dorsiflexion, and ankle  plantarflexion    Tone: [  x ] wnl,   [    ]  other:  DTRs: [ x ]symmetric, [   ] other:  Coordination:   [    ] intact,   [ x ] other: +FTN, and heel to shin on the L, + L dysdiadokinesia / dysmetria.                                                                     Sensory: [ x  ] Intact to light touch,   [  ] other:    No clonus; No spasticity    MEDICATIONS  (STANDING):  amLODIPine   Tablet 5 milliGRAM(s) Oral daily  aspirin enteric coated 81 milliGRAM(s) Oral daily  atorvastatin 80 milliGRAM(s) Oral at bedtime  carvedilol 6.25 milliGRAM(s) Oral every 12 hours  clopidogrel Tablet 75 milliGRAM(s) Oral daily  dextrose 5% + sodium chloride 0.45%. 1000 milliLiter(s) (75 mL/Hr) IV Continuous <Continuous>  enoxaparin Injectable 40 milliGRAM(s) SubCutaneous every 24 hours  famotidine    Tablet 20 milliGRAM(s) Oral two times a day  lisinopril 5 milliGRAM(s) Oral at bedtime  polyethylene glycol 3350 17 Gram(s) Oral daily  VOLTAREN  GEL  1%  TOPICAL OINTMENT 1 Application(s) 1 Application(s) Topical three times a day    MEDICATIONS  (PRN):  acetaminophen     Tablet .. 650 milliGRAM(s) Oral every 6 hours PRN Temp greater or equal to 38C (100.4F), Mild Pain (1 - 3)  melatonin 3 milliGRAM(s) Oral at bedtime PRN Insomnia  senna 2 Tablet(s) Oral at bedtime PRN Constipation  traMADol 25 milliGRAM(s) Oral every 6 hours PRN severe pain shoulders      RECENT LABS/IMAGING                          12.2   5.95  )-----------( 200      ( 10 Feb 2024 06:52 )             35.8     02-10    139  |  105  |  21<H>  ----------------------------<  98  3.7   |  23  |  0.7    Ca    9.2      10 Feb 2024 06:52  Mg     1.8     02-10        Urinalysis Basic - ( 10 Feb 2024 06:52 )    Color: x / Appearance: x / SG: x / pH: x  Gluc: 98 mg/dL / Ketone: x  / Bili: x / Urobili: x   Blood: x / Protein: x / Nitrite: x   Leuk Esterase: x / RBC: x / WBC x   Sq Epi: x / Non Sq Epi: x / Bacteria: x

## 2024-02-11 NOTE — PROGRESS NOTE ADULT - REASON FOR ADMISSION
Rehab of left cerebellar stroke with left hemiataxia.(nondominant) Rehab for left cerebellar stroke with left hemiataxia.(nondominant)

## 2024-02-11 NOTE — PROGRESS NOTE ADULT - ATTENDING COMMENTS
Patient seen and examined with the rehab resident. We discussed the case. I have directed the care. I edited the note. The patient requires acute rehab with 3 hours of daily therapies at least 5 out of 7 days and close physiatry follow up.    Assessment:  This is a 84 y/o right-handed F with PMHx of HLD, HTN, L knee OA presenting for worsening unsteady gait. In the ED, vitals were significant for /72. CTH notable for L cerebellar subacute infarct. 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 NIHSS on admission was 3 for ataxia L sided and LLE. Patient was admitted to stroke unit for further work up. On 2/6, the patient's Bp was elevated requiring 15mg of hydralazine overnight. CTH was repeated for possible worsening dysmetria, end gaze nystagmus, and NIHSS increase from 0 to 2 (for upper and lower left extremity dysmetria), urgent CTH showed no intracranial hemorrhage or midline shift. Left cerebellar hypodense lesion consistent with evolving subacute infarct.    The patient was evaluated by the PM&R team once medically stable. The patient was found to have functional limitation in terms of muscle strength, endurance, physical mobility, and ability to carry out activities of daily living (self care, transfers, and ambulation). The patient was started on a course of bedside therapy, the pt is motivated and able to start 3 hours of therapy  daily for 6-7 days a week. With PT/OT, pt required Min A w transfers, Mod A w UB dressing, Mod A w LB dressing. Amb 15' using RW requiring Mod A.  SLP evaluated the patient and recommended regular with thin liquids.The patient was deemed to be a good candidate for admission for acute inpatient rehab. The patient was admitted to acute inpatient rehab on 2/7/24.     Plan:  #Rehab for left cerebellar stroke with left hemiataxia.(nondominanat), and impaired cognition/ memory  likely due to small vessel dz vs A-A emboli  MRI brain w/out: Acute infarcts involving the left cerebellar hemisphere without hemorrhagic transformation  HbA1c 5.8 , TSH 2.10 and   TTE Mildly enlarged L atria. Normal EF  NIHSS+2= Ataxia in 2 limbs on admission to rehab  - S/p Loaded Aspirin 325mg and Plavix 300mg  - C/w Aspirin 81mg and Plavix 75mg daily for 90 days  - EP consulted. ILR to be placed likely on Monday  - HOB at 45 degrees, aspiration precautions  - PT/OT/ST/ neuropsych eval    #HTN  - SBP goal: 110-150  - c/w carvedilol 20 mg extended release daily  - c/w HCTZ 25 mg daily  - c/w amlodipine 10 mg once daily   -c/w lisinopril 5 mg at bedtime   -Will monitor and adjust accordingly     #HLD  - c/w lipitor 80 mg daily    #SARS-Cov-2 positive 2/10/2024  -No fever, no WBC count  -Isolation precautions  -Monitor   - IVF    Diarrhea r/o C. diff (2/11)  -3 loose stool episodes in AM 2/11/2024  -C diff test ordered  -bowel regimen held    #Bilat knee OA/ left knee chronic pain  - no NSAIDS for now  - Tylenol PRN for pain  - Modalities   - consider Voltaren cream and/ or lidoderm patch and Tylenol    #GI/Bowel Mgmt:/ Constipation  - finally had BM today with relief  - Miralax, senna (held 2/2 diarrhea)     -FEN : Monitor Na levels    - Diet: DASH/TLC    Precautions / PROPHYLAXIS:      - Falls      - DVT prophylaxis: Lovenox

## 2024-02-12 LAB
ALBUMIN SERPL ELPH-MCNC: 3.3 G/DL — LOW (ref 3.5–5.2)
ALP SERPL-CCNC: 83 U/L — SIGNIFICANT CHANGE UP (ref 30–115)
ALT FLD-CCNC: 36 U/L — SIGNIFICANT CHANGE UP (ref 0–41)
ANION GAP SERPL CALC-SCNC: 9 MMOL/L — SIGNIFICANT CHANGE UP (ref 7–14)
AST SERPL-CCNC: 27 U/L — SIGNIFICANT CHANGE UP (ref 0–41)
BASOPHILS # BLD AUTO: 0.02 K/UL — SIGNIFICANT CHANGE UP (ref 0–0.2)
BASOPHILS NFR BLD AUTO: 0.4 % — SIGNIFICANT CHANGE UP (ref 0–1)
BILIRUB SERPL-MCNC: 0.3 MG/DL — SIGNIFICANT CHANGE UP (ref 0.2–1.2)
BUN SERPL-MCNC: 12 MG/DL — SIGNIFICANT CHANGE UP (ref 10–20)
CALCIUM SERPL-MCNC: 9.2 MG/DL — SIGNIFICANT CHANGE UP (ref 8.4–10.5)
CHLORIDE SERPL-SCNC: 106 MMOL/L — SIGNIFICANT CHANGE UP (ref 98–110)
CO2 SERPL-SCNC: 24 MMOL/L — SIGNIFICANT CHANGE UP (ref 17–32)
CREAT SERPL-MCNC: 0.6 MG/DL — LOW (ref 0.7–1.5)
EGFR: 90 ML/MIN/1.73M2 — SIGNIFICANT CHANGE UP
EOSINOPHIL # BLD AUTO: 0.2 K/UL — SIGNIFICANT CHANGE UP (ref 0–0.7)
EOSINOPHIL NFR BLD AUTO: 3.7 % — SIGNIFICANT CHANGE UP (ref 0–8)
GLUCOSE SERPL-MCNC: 95 MG/DL — SIGNIFICANT CHANGE UP (ref 70–99)
HCT VFR BLD CALC: 37.3 % — SIGNIFICANT CHANGE UP (ref 37–47)
HGB BLD-MCNC: 12.8 G/DL — SIGNIFICANT CHANGE UP (ref 12–16)
IMM GRANULOCYTES NFR BLD AUTO: 0.4 % — HIGH (ref 0.1–0.3)
LYMPHOCYTES # BLD AUTO: 2.58 K/UL — SIGNIFICANT CHANGE UP (ref 1.2–3.4)
LYMPHOCYTES # BLD AUTO: 47.3 % — SIGNIFICANT CHANGE UP (ref 20.5–51.1)
MAGNESIUM SERPL-MCNC: 1.6 MG/DL — LOW (ref 1.8–2.4)
MCHC RBC-ENTMCNC: 31 PG — SIGNIFICANT CHANGE UP (ref 27–31)
MCHC RBC-ENTMCNC: 34.3 G/DL — SIGNIFICANT CHANGE UP (ref 32–37)
MCV RBC AUTO: 90.3 FL — SIGNIFICANT CHANGE UP (ref 81–99)
MONOCYTES # BLD AUTO: 0.69 K/UL — HIGH (ref 0.1–0.6)
MONOCYTES NFR BLD AUTO: 12.7 % — HIGH (ref 1.7–9.3)
NEUTROPHILS # BLD AUTO: 1.94 K/UL — SIGNIFICANT CHANGE UP (ref 1.4–6.5)
NEUTROPHILS NFR BLD AUTO: 35.5 % — LOW (ref 42.2–75.2)
NRBC # BLD: 0 /100 WBCS — SIGNIFICANT CHANGE UP (ref 0–0)
PLATELET # BLD AUTO: 201 K/UL — SIGNIFICANT CHANGE UP (ref 130–400)
PMV BLD: 10.7 FL — HIGH (ref 7.4–10.4)
POTASSIUM SERPL-MCNC: 3.9 MMOL/L — SIGNIFICANT CHANGE UP (ref 3.5–5)
POTASSIUM SERPL-SCNC: 3.9 MMOL/L — SIGNIFICANT CHANGE UP (ref 3.5–5)
PROT SERPL-MCNC: 5.6 G/DL — LOW (ref 6–8)
RBC # BLD: 4.13 M/UL — LOW (ref 4.2–5.4)
RBC # FLD: 12.9 % — SIGNIFICANT CHANGE UP (ref 11.5–14.5)
SODIUM SERPL-SCNC: 139 MMOL/L — SIGNIFICANT CHANGE UP (ref 135–146)
WBC # BLD: 5.45 K/UL — SIGNIFICANT CHANGE UP (ref 4.8–10.8)
WBC # FLD AUTO: 5.45 K/UL — SIGNIFICANT CHANGE UP (ref 4.8–10.8)

## 2024-02-12 RX ORDER — MAGNESIUM SULFATE 500 MG/ML
2 VIAL (ML) INJECTION ONCE
Refills: 0 | Status: COMPLETED | OUTPATIENT
Start: 2024-02-12 | End: 2024-02-12

## 2024-02-12 RX ORDER — AMLODIPINE BESYLATE 2.5 MG/1
10 TABLET ORAL DAILY
Refills: 0 | Status: DISCONTINUED | OUTPATIENT
Start: 2024-02-13 | End: 2024-02-22

## 2024-02-12 RX ORDER — MAGNESIUM OXIDE 400 MG ORAL TABLET 241.3 MG
400 TABLET ORAL
Refills: 0 | Status: COMPLETED | OUTPATIENT
Start: 2024-02-12 | End: 2024-02-19

## 2024-02-12 RX ADMIN — FAMOTIDINE 20 MILLIGRAM(S): 10 INJECTION INTRAVENOUS at 05:51

## 2024-02-12 RX ADMIN — MAGNESIUM OXIDE 400 MG ORAL TABLET 400 MILLIGRAM(S): 241.3 TABLET ORAL at 18:15

## 2024-02-12 RX ADMIN — FAMOTIDINE 20 MILLIGRAM(S): 10 INJECTION INTRAVENOUS at 18:15

## 2024-02-12 RX ADMIN — CARVEDILOL PHOSPHATE 6.25 MILLIGRAM(S): 80 CAPSULE, EXTENDED RELEASE ORAL at 18:18

## 2024-02-12 RX ADMIN — ATORVASTATIN CALCIUM 80 MILLIGRAM(S): 80 TABLET, FILM COATED ORAL at 21:47

## 2024-02-12 RX ADMIN — ENOXAPARIN SODIUM 40 MILLIGRAM(S): 100 INJECTION SUBCUTANEOUS at 21:47

## 2024-02-12 RX ADMIN — MAGNESIUM OXIDE 400 MG ORAL TABLET 400 MILLIGRAM(S): 241.3 TABLET ORAL at 12:47

## 2024-02-12 RX ADMIN — Medication 25 GRAM(S): at 18:04

## 2024-02-12 RX ADMIN — AMLODIPINE BESYLATE 5 MILLIGRAM(S): 2.5 TABLET ORAL at 05:51

## 2024-02-12 RX ADMIN — CARVEDILOL PHOSPHATE 6.25 MILLIGRAM(S): 80 CAPSULE, EXTENDED RELEASE ORAL at 05:51

## 2024-02-12 RX ADMIN — CLOPIDOGREL BISULFATE 75 MILLIGRAM(S): 75 TABLET, FILM COATED ORAL at 12:47

## 2024-02-12 RX ADMIN — LISINOPRIL 5 MILLIGRAM(S): 2.5 TABLET ORAL at 21:47

## 2024-02-12 RX ADMIN — Medication 81 MILLIGRAM(S): at 12:47

## 2024-02-12 NOTE — PROGRESS NOTE ADULT - ASSESSMENT
Assessment:  This is a 82 y/o right-handed F with PMHx of HLD, HTN, L knee OA presenting for worsening unsteady gait. In the ED, vitals were significant for /72. CTH notable for L cerebellar subacute infarct. 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 NIHSS on admission was 3 for ataxia L sided and LLE. Patient was admitted to stroke unit for further work up. On 2/6, the patient's Bp was elevated requiring 15mg of hydralazine overnight. CTH was repeated for possible worsening dysmetria, end gaze nystagmus, and NIHSS increase from 0 to 2 (for upper and lower left extremity dysmetria), urgent CTH showed no intracranial hemorrhage or midline shift. Left cerebellar hypodense lesion consistent with evolving subacute infarct.    The patient was evaluated by the PM&R team once medically stable. The patient was found to have functional limitation in terms of muscle strength, endurance, physical mobility, and ability to carry out activities of daily living (self care, transfers, and ambulation). The patient was started on a course of bedside therapy, the pt is motivated and able to start 3 hours of therapy  daily for 6-7 days a week. With PT/OT, pt required Min A w transfers, Mod A w UB dressing, Mod A w LB dressing. Amb 15' using RW requiring Mod A.  SLP evaluated the patient and recommended regular with thin liquids.The patient was deemed to be a good candidate for admission for acute inpatient rehab. The patient was admitted to acute inpatient rehab on 2/7/24.     Plan:  #Rehab for left cerebellar stroke with left hemiataxia.(nondominanat), and impaired cognition/ memory  likely due to small vessel dz vs A-A emboli  MRI brain w/out: Acute infarcts involving the left cerebellar hemisphere without hemorrhagic transformation  HbA1c 5.8 , TSH 2.10 and   TTE Mildly enlarged L atria. Normal EF  NIHSS+2= Ataxia in 2 limbs on admission to rehab  - S/p Loaded Aspirin 325mg and Plavix 300mg  - C/w Aspirin 81mg and Plavix 75mg daily for 90 days  - EP consulted. ILR to be placed likely on Monday  - HOB at 45 degrees, aspiration precautions  - PT/OT/ST/ neuropsych eval    #HTN  - SBP goal: 110-150  - c/w carvedilol 20 mg extended release daily  - c/w HCTZ 25 mg daily  - c/w amlodipine 10 mg once daily   -c/w lisinopril 5 mg at bedtime   -Will monitor and adjust accordingly     #HLD  - c/w lipitor 80 mg daily    #SARS-Cov-2 positive 2/10/2024  -No fever, no WBC count  -Isolation precautions  -Monitor   - IVF    Diarrhea (2/11)-resolved  -3 loose stool episodes in AM 2/11/2024  -bowel regimen held    #Bilat knee OA/ left knee chronic pain  - no NSAIDS for now  - Tylenol PRN for pain  - Modalities   - consider Voltaren cream and/ or lidoderm patch and Tylenol    #GI/Bowel Mgmt:/ Constipation  - finally had BM today with relief  - Miralax, senna (held 2/2 diarrhea)     -FEN : Monitor Na levels    - Diet: DASH/TLC    Precautions / PROPHYLAXIS:      - Falls      - DVT prophylaxis: Lovenox

## 2024-02-12 NOTE — PROGRESS NOTE ADULT - SUBJECTIVE AND OBJECTIVE BOX
Patient is an 83 y old female who presents with a chief complaint of Rehab of left cerebellar stroke with left hemiataxia.(nondominant) (07 Feb 2024 14:25)    HPI:  This is a 82 y/o RHD F with PMHx of HLD, HTN, L knee OA presenting for unsteady gait.    Patient states she was in her normal state of health a day prior to presentation. She was outside having lunch with her friend when she started to notice she was off balance that persists after coming back home. For worsening unsteady gait and imbalance she decided  to seek medical help the next day. She admits to vomiting, but denies any HA, diplopia, N, dizziness, numbness/tingling, speech difficulties. In the ED, vitals were significant for /72. CTH notable for L cerebellar subacute infarct. 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 NIHSS on admission was 3. Patient was admitted to stroke unit for further work up.     On 2/6/2204, the patient's Bp was elevated requiring 15mg of hydralazine overnight. CTH was repeated for possible worsening dysmetria, end gaze nystagmus, and NIHSS increase from 0 to 2 (for upper and lower left extremity dysmetria), urgent CTH showed no intracranial hemorrhage or midline shift. Left cerebellar hypodense lesion consistent with evolving subacute infarct.    The patient was evaluated by the PM&R team once medically stable. The patient was found to have functional limitation in terms of muscle strength, endurance, physical mobility, and ability to carry out activities of daily living (self care, transfers, and ambulation). The patient was started on a course of bedside therapy, the pt is motivated and able to start 3 hours of therapy  daily for 6-7 days a week. With PT/OT,  pt required Min A w transfers, Mod A w UB dressing, Mod A w LB dressing. Amb 15' using RW requiring Mod A. SLP evaluated the patient and recommended regular with thin liquids. The patient was deemed to be a good candidate for admission for acute inpatient rehab. The patient was admitted to acute inpatient rehab on 2/7/24.    (07 Feb 2024 14:25)    I examined the patient and reviewed the chart. There have been no significant changes since the history and physical except where documented below.    TODAY'S SUBJECTIVE & REVIEW OF SYMPTOMS:  Patient was seen and examined at bedside this morning  No acute overnight events reported   She reported mild intermittent dry cough at times, denies any other acute complaints, denies any N/V, fever/chills, CP, SOB  Patient is participating/tolerating therapies well.  Having regular BM and voiding freely.     Vital signs reviewed and stable  Patient COVID-19 positive and remains on isolation.       Constitutional:    [x ] WNL           [   ] poor appetite   [   ] insomnia   [  ] tired   Cardio:                [ x ] WNL           [   ] CP   [  ] BARBOSA   [   ] palpitations               Resp:                   [   ] WNL           [   ] SOB   [ x ] cough-intermittent   [   ] wheezing   GI:                        [ x  ] WNL           [   ] constipation  [   ] diarrhea   [   ] abdominal pain   [   ] nausea   [   ] emesis                                :                      [   ] WNL           [   ] FRANCES  [   ] dysuria   [   ] difficulty voiding   []: Other:    Endo:                   [ x  ] WNL          [   ] polyuria   [   ] temperature intolerance                 Skin:                     [ x  ] WNL          [   ] pain   [   ] wound  [   ] rash   MSK:                    [  ] WNL          [  ] muscle pain   [ x ] joint pain/ stiffness-b/l knee pain L>R   [   ] muscle tenderness   [   ] swelling   Neuro:                 [   ] WNL          [   ] HA   [   ] change in vision   [   ] tremor   [x  ] LSW weakness  [   ]dysphagia            Cognitive:           [ x ] WNL           [  ]confusion      Psych:                  [  x ] WNL           [   ] hallucinations   [   ]agitation   [   ] delusion   [   ]depression    PHYSICAL EXAM  Vital Signs Last 24 Hrs  T(C): 37 (11 Feb 2024 21:56), Max: 37.3 (11 Feb 2024 12:36)  T(F): 98.6 (11 Feb 2024 21:56), Max: 99.2 (11 Feb 2024 12:36)  HR: 63 (12 Feb 2024 05:22) (63 - 68)  BP: 146/67 (12 Feb 2024 05:22) (126/60 - 181/74)  RR: 18 (11 Feb 2024 21:56) (18 - 18)    General:[ x  ] NAD, Resting Comfortable   [   ] other:                                HEENT: [ x  ] NC/AT, EOMI,  Normal Conjunctivae,   [   ] other:   Cardio: [  x ] RRR, no murmur,   [   ] other:                              Pulm: [ x  ] No Respiratory Distress,  Lungs CTAB,   [   ] other:             Abdomen: [ x  ]ND/NT, Soft,   [   ] other:    : [ x  ] NO FRANCES CATHETER, [   ] FRANCES CATHETER present [ x ] other: PureWick  MSK: [ x ] No joint swelling, Full ROM   [   ] other:                                         Ext: [x ]No C/C/E, No calf tenderness,   [ ]other:    Skin: [ x  ]intact,   [   ] other: wound                                               Neurological Examination:  Cognitive: [  x  ] AAO x 3,   [  ]  other:     Attention:  [ x  ] intact   [    ]  other:           Concentration: [ x  ] intact   [    ]  other:   Language: [  ] intact  [ x ] Able to name 3 objects and describe function          Memory: [   ] intact,    [ x ]  other: Impaired, able to recall 1/3 objects   Mood/Affect: [   ] wnl  [  x  ]  other: emotional labile (crying)                                                                          Communication: [ x  ]Fluent, no dysarthria [ ] other:   CN II - XII:  [    ] intact,  [  x  ] other: horizontal  nystagmus noted  with fixation                                                                                        Motor:   RUE: 5/5: shoulder abduction, elbow flexion, elbow extension,  finger flexion  LUE: 4+/5: shoulder abduction, 4+/5 elbow flexion, elbow extension, 4+/5 finger flexion  RLE: 5/5 hip flexion, knee ext, knee flex, ankle dorsiflexion, and ankle  plantarflexion  LLE: 4+/5: hip flexion, knee ext, knee flex, ankle dorsiflexion, and ankle  plantarflexion    Tone: [  x ] wnl,   [    ]  other:  DTRs: [ x ]symmetric, [   ] other:  Coordination:   [    ] intact,   [ x ] other: +FTN, and heel to shin on the L, + L dysdiadokinesia / dysmetria.                                                                     Sensory: [ x  ] Intact to light touch,   [  ] other:    No clonus; No spasticity    MEDICATIONS  (STANDING):  amLODIPine   Tablet 5 milliGRAM(s) Oral daily  aspirin enteric coated 81 milliGRAM(s) Oral daily  atorvastatin 80 milliGRAM(s) Oral at bedtime  carvedilol 6.25 milliGRAM(s) Oral every 12 hours  clopidogrel Tablet 75 milliGRAM(s) Oral daily  dextrose 5% + sodium chloride 0.45%. 1000 milliLiter(s) (75 mL/Hr) IV Continuous <Continuous>  enoxaparin Injectable 40 milliGRAM(s) SubCutaneous every 24 hours  famotidine    Tablet 20 milliGRAM(s) Oral two times a day  lisinopril 5 milliGRAM(s) Oral at bedtime  VOLTAREN  GEL  1%  TOPICAL OINTMENT 1 Application(s) 1 Application(s) Topical three times a day    MEDICATIONS  (PRN):  acetaminophen     Tablet .. 650 milliGRAM(s) Oral every 6 hours PRN Temp greater or equal to 38C (100.4F), Mild Pain (1 - 3)  melatonin 3 milliGRAM(s) Oral at bedtime PRN Insomnia  traMADol 25 milliGRAM(s) Oral every 6 hours PRN severe pain shoulders      RECENT LABS/IMAGING                                    12.8   5.45  )-----------( 201      ( 12 Feb 2024 07:14 )             37.3     02-12    139  |  106  |  12  ----------------------------<  95  3.9   |  24  |  0.6<L>    Ca    9.2      12 Feb 2024 07:14  Mg     1.6     02-12    TPro  5.6<L>  /  Alb  3.3<L>  /  TBili  0.3  /  DBili  x   /  AST  27  /  ALT  36  /  AlkPhos  83  02-12      Urinalysis Basic - ( 12 Feb 2024 07:14 )    Color: x / Appearance: x / SG: x / pH: x  Gluc: 95 mg/dL / Ketone: x  / Bili: x / Urobili: x   Blood: x / Protein: x / Nitrite: x   Leuk Esterase: x / RBC: x / WBC x   Sq Epi: x / Non Sq Epi: x / Bacteria: x

## 2024-02-12 NOTE — PROGRESS NOTE ADULT - ATTENDING COMMENTS
Patient seen and examined with the resident. We discussed the case. I have directed the care. I edited the note. The patient requires acute rehab with 3 hours of daily therapies at least 5 out of 7 days and close physiatry follow up.  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 Normal EF  NIHSS+2= Ataxia in 2 limbs on admission to rehab  - S/p Loaded Aspirin 325mg and Plavix 300mg  - C/w Aspirin 81mg and Plavix 75mg daily for 90 days  - EP consulted. ILR to be placed likely on Monday  - HOB at 45 degrees, aspiration precautions  - PT/OT/ST/ neuropsych eval    #HTN  - SBP goal: 110-150  - c/w carvedilol 20 mg extended release daily  - c/w HCTZ 25 mg daily  - c/w amlodipine 10 mg once daily   -c/w lisinopril 5 mg at bedtime   -Will monitor and adjust accordingly     #HLD  - c/w lipitor 80 mg daily    #SARS-Cov-2 positive 2/10/2024  -No fever, no WBC count  -Isolation precautions  -Monitor   - IVF    Diarrhea (2/11)-resolved  -3 loose stool episodes in AM 2/11/2024  -bowel regimen held    #Bilat knee OA/ left knee chronic pain  - no NSAIDS for now  - Tylenol PRN for pain  - Modalities   - consider Voltaren cream and/ or lidoderm patch and Tylenol    #GI/Bowel Mgmt:/ Constipation  - finally had BM today with relief  - Miralax, senna (held 2/2 diarrhea)

## 2024-02-13 RX ADMIN — MAGNESIUM OXIDE 400 MG ORAL TABLET 400 MILLIGRAM(S): 241.3 TABLET ORAL at 08:03

## 2024-02-13 RX ADMIN — MAGNESIUM OXIDE 400 MG ORAL TABLET 400 MILLIGRAM(S): 241.3 TABLET ORAL at 12:30

## 2024-02-13 RX ADMIN — FAMOTIDINE 20 MILLIGRAM(S): 10 INJECTION INTRAVENOUS at 18:13

## 2024-02-13 RX ADMIN — AMLODIPINE BESYLATE 10 MILLIGRAM(S): 2.5 TABLET ORAL at 06:08

## 2024-02-13 RX ADMIN — ENOXAPARIN SODIUM 40 MILLIGRAM(S): 100 INJECTION SUBCUTANEOUS at 21:35

## 2024-02-13 RX ADMIN — FAMOTIDINE 20 MILLIGRAM(S): 10 INJECTION INTRAVENOUS at 06:08

## 2024-02-13 RX ADMIN — CLOPIDOGREL BISULFATE 75 MILLIGRAM(S): 75 TABLET, FILM COATED ORAL at 12:30

## 2024-02-13 RX ADMIN — CARVEDILOL PHOSPHATE 6.25 MILLIGRAM(S): 80 CAPSULE, EXTENDED RELEASE ORAL at 18:14

## 2024-02-13 RX ADMIN — ATORVASTATIN CALCIUM 80 MILLIGRAM(S): 80 TABLET, FILM COATED ORAL at 21:35

## 2024-02-13 RX ADMIN — CARVEDILOL PHOSPHATE 6.25 MILLIGRAM(S): 80 CAPSULE, EXTENDED RELEASE ORAL at 06:08

## 2024-02-13 RX ADMIN — Medication 81 MILLIGRAM(S): at 12:31

## 2024-02-13 RX ADMIN — LISINOPRIL 5 MILLIGRAM(S): 2.5 TABLET ORAL at 21:35

## 2024-02-13 RX ADMIN — MAGNESIUM OXIDE 400 MG ORAL TABLET 400 MILLIGRAM(S): 241.3 TABLET ORAL at 18:14

## 2024-02-13 NOTE — PROGRESS NOTE ADULT - ASSESSMENT
Assessment:  This is a 82 y/o right-handed F with PMHx of HLD, HTN, L knee OA presenting for worsening unsteady gait. In the ED, vitals were significant for /72. CTH notable for L cerebellar subacute infarct. 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 NIHSS on admission was 3 for ataxia L sided and LLE. Patient was admitted to stroke unit for further work up. On 2/6, the patient's Bp was elevated requiring 15mg of hydralazine overnight. CTH was repeated for possible worsening dysmetria, end gaze nystagmus, and NIHSS increase from 0 to 2 (for upper and lower left extremity dysmetria), urgent CTH showed no intracranial hemorrhage or midline shift. Left cerebellar hypodense lesion consistent with evolving subacute infarct.    The patient was evaluated by the PM&R team once medically stable. The patient was found to have functional limitation in terms of muscle strength, endurance, physical mobility, and ability to carry out activities of daily living (self care, transfers, and ambulation). The patient was started on a course of bedside therapy, the pt is motivated and able to start 3 hours of therapy  daily for 6-7 days a week. With PT/OT, pt required Min A w transfers, Mod A w UB dressing, Mod A w LB dressing. Amb 15' using RW requiring Mod A.  SLP evaluated the patient and recommended regular with thin liquids.The patient was deemed to be a good candidate for admission for acute inpatient rehab. The patient was admitted to acute inpatient rehab on 2/7/24.     Plan:  #Rehab for left cerebellar stroke with left hemiataxia.(nondominanat), and impaired cognition/ memory  likely due to small vessel dz vs A-A emboli  MRI brain w/out: Acute infarcts involving the left cerebellar hemisphere without hemorrhagic transformation  HbA1c 5.8 , TSH 2.10 and   TTE Mildly enlarged L atria. Normal EF  NIHSS+2= Ataxia in 2 limbs on admission to rehab  - S/p Loaded Aspirin 325mg and Plavix 300mg  - C/w Aspirin 81mg and Plavix 75mg daily for 90 days  - F/u EP for ILR placement  - HOB at 45 degrees, aspiration precautions  - PT/OT/ST/ neuropsych eval    #HTN  - SBP goal: 110-150  - c/w carvedilol 6.25 mg q12hrs  - c/w amlodipine 10 mg once daily   -c/w lisinopril 5 mg at bedtime   -Will monitor and adjust accordingly     #HLD  - c/w lipitor 80 mg daily    #SARS-Cov-2 positive 2/10/2024  -No fever, no WBC count  -Isolation precautions  -Monitor     Diarrhea (2/11)-resolved  -3 loose stool episodes in AM 2/11/2024  -bowel regimen held    #Bilat knee OA/ left knee chronic pain  - no NSAIDS for now  - Tylenol PRN for pain  - Modalities   - consider Voltaren cream and/ or lidoderm patch and Tylenol    #GI/Bowel Mgmt:/ Constipation  - finally had BM today with relief  - Miralax, senna (held 2/2 diarrhea)     -FEN : Monitor Na levels    - Diet: DASH/TLC    Precautions / PROPHYLAXIS:      - Falls      - DVT prophylaxis: Lovenox        Assessment:  This is a 82 y/o right-handed F with PMHx of HLD, HTN, L knee OA presenting for worsening unsteady gait. In the ED, vitals were significant for /72. CTH notable for L cerebellar subacute infarct. 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 NIHSS on admission was 3 for ataxia L sided and LLE. Patient was admitted to stroke unit for further work up. On 2/6, the patient's Bp was elevated requiring 15mg of hydralazine overnight. CTH was repeated for possible worsening dysmetria, end gaze nystagmus, and NIHSS increase from 0 to 2 (for upper and lower left extremity dysmetria), urgent CTH showed no intracranial hemorrhage or midline shift. Left cerebellar hypodense lesion consistent with evolving subacute infarct.    The patient was evaluated by the PM&R team once medically stable. The patient was found to have functional limitation in terms of muscle strength, endurance, physical mobility, and ability to carry out activities of daily living (self care, transfers, and ambulation). The patient was started on a course of bedside therapy, the pt is motivated and able to start 3 hours of therapy  daily for 6-7 days a week. With PT/OT, pt required Min A w transfers, Mod A w UB dressing, Mod A w LB dressing. Amb 15' using RW requiring Mod A.  SLP evaluated the patient and recommended regular with thin liquids.The patient was deemed to be a good candidate for admission for acute inpatient rehab. The patient was admitted to acute inpatient rehab on 2/7/24.     Plan:  #Rehab for left cerebellar stroke with left hemiataxia.(nondominanat), and impaired cognition/ memory  likely due to small vessel dz vs A-A emboli  MRI brain w/out: Acute infarcts involving the left cerebellar hemisphere without hemorrhagic transformation  HbA1c 5.8 , TSH 2.10 and   TTE Mildly enlarged L atria. Normal EF  NIHSS+2= Ataxia in 2 limbs on admission to rehab  - S/p Loaded Aspirin 325mg and Plavix 300mg  - C/w Aspirin 81mg and Plavix 75mg daily for 90 days  - F/u EP for ILR placement  - HOB at 45 degrees, aspiration precautions  - PT/OT/ST/ neuropsych eval    #HTN  Home meds: Carvedilol 20 mg extended release PO daily, HCTZ 25 mg PO daily, Amlodipine 10 mg PO daily  - SBP goal: 110-150  - c/w carvedilol 6.25 mg q12hrs  - c/w amlodipine 10 mg once daily   -c/w lisinopril 5 mg at bedtime   -Will monitor and adjust accordingly     #HLD  - c/w lipitor 80 mg daily    #SARS-Cov-2 positive 2/10/2024  -No fever, no WBC count  -Isolation precautions  -Monitor     Diarrhea (2/11)-resolved  -3 loose stool episodes in AM 2/11/2024  -bowel regimen held    #Bilat knee OA/ left knee chronic pain  - no NSAIDS for now  - Tylenol PRN for pain  - Modalities   - consider Voltaren cream and/ or lidoderm patch and Tylenol    #GI/Bowel Mgmt:/ Constipation  - finally had BM today with relief  - Miralax, senna (held 2/2 diarrhea)     -FEN : Monitor Na levels    - Diet: DASH/TLC    Precautions / PROPHYLAXIS:      - Falls      - DVT prophylaxis: Lovenox

## 2024-02-13 NOTE — PROGRESS NOTE ADULT - SUBJECTIVE AND OBJECTIVE BOX
Patient is an 83 y old female who presents with a chief complaint of Rehab of left cerebellar stroke with left hemiataxia.(nondominant) (07 Feb 2024 14:25)    HPI:  This is a 82 y/o RHD F with PMHx of HLD, HTN, L knee OA presenting for unsteady gait.    Patient states she was in her normal state of health a day prior to presentation. She was outside having lunch with her friend when she started to notice she was off balance that persists after coming back home. For worsening unsteady gait and imbalance she decided  to seek medical help the next day. She admits to vomiting, but denies any HA, diplopia, N, dizziness, numbness/tingling, speech difficulties. In the ED, vitals were significant for /72. CTH notable for L cerebellar subacute infarct. 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 NIHSS on admission was 3. Patient was admitted to stroke unit for further work up.     On 2/6/2204, the patient's Bp was elevated requiring 15mg of hydralazine overnight. CTH was repeated for possible worsening dysmetria, end gaze nystagmus, and NIHSS increase from 0 to 2 (for upper and lower left extremity dysmetria), urgent CTH showed no intracranial hemorrhage or midline shift. Left cerebellar hypodense lesion consistent with evolving subacute infarct.    The patient was evaluated by the PM&R team once medically stable. The patient was found to have functional limitation in terms of muscle strength, endurance, physical mobility, and ability to carry out activities of daily living (self care, transfers, and ambulation). The patient was started on a course of bedside therapy, the pt is motivated and able to start 3 hours of therapy  daily for 6-7 days a week. With PT/OT,  pt required Min A w transfers, Mod A w UB dressing, Mod A w LB dressing. Amb 15' using RW requiring Mod A. SLP evaluated the patient and recommended regular with thin liquids. The patient was deemed to be a good candidate for admission for acute inpatient rehab. The patient was admitted to acute inpatient rehab on 2/7/24.    (07 Feb 2024 14:25)    I examined the patient and reviewed the chart. There have been no significant changes since the history and physical except where documented below.    TODAY'S SUBJECTIVE & REVIEW OF SYMPTOMS:  Patient was seen and examined at bedside this morning  No acute overnight events reported   Denies any acute complaints, reported watery diarrhea has resolved.  Patient is participating/tolerating therapies well.  Having regular BM and voiding freely.     Vital signs reviewed and stable  Patient COVID-19 positive and remains on isolation. She feels well and has not developed any symptoms.    Constitutional:    [x ] WNL           [   ] poor appetite   [   ] insomnia   [  ] tired   Cardio:                [ x ] WNL           [   ] CP   [  ] BARBOSA   [   ] palpitations               Resp:                   [   ] WNL           [   ] SOB   [ x ] cough-intermittent-improving   [   ] wheezing   GI:                        [ x  ] WNL           [   ] constipation  [   ] diarrhea   [   ] abdominal pain   [   ] nausea   [   ] emesis                                :                      [   ] WNL           [   ] FRANCES  [   ] dysuria   [   ] difficulty voiding   []: Other:    Endo:                   [ x  ] WNL          [   ] polyuria   [   ] temperature intolerance                 Skin:                     [ x  ] WNL          [   ] pain   [   ] wound  [   ] rash   MSK:                    [  ] WNL          [  ] muscle pain   [ x ] joint pain/ stiffness-b/l knee pain L>R -stable  [   ] muscle tenderness   [   ] swelling   Neuro:                 [   ] WNL          [   ] HA   [   ] change in vision   [   ] tremor   [x  ] LSW weakness  [   ]dysphagia            Cognitive:           [ x ] WNL           [  ]confusion      Psych:                  [  x ] WNL           [   ] hallucinations   [   ]agitation   [   ] delusion   [   ]depression    PHYSICAL EXAM  Vital Signs Last 24 Hrs  T(C): 35.7 (13 Feb 2024 05:15), Max: 36.7 (12 Feb 2024 12:55)  T(F): 96.3 (13 Feb 2024 05:15), Max: 98 (12 Feb 2024 12:55)  HR: 60 (13 Feb 2024 05:15) (60 - 69)  BP: 169/77 (13 Feb 2024 05:15) (139/69 - 169/77)  RR: 18 (13 Feb 2024 05:15) (18 - 20)    General:[ x  ] NAD, Resting Comfortable   [   ] other:                                HEENT: [ x  ] NC/AT, EOMI,  Normal Conjunctivae,   [   ] other:   Cardio: [  x ] RRR, no murmur,   [   ] other:                              Pulm: [ x  ] No Respiratory Distress,  Lungs CTAB,   [   ] other:             Abdomen: [ x  ]ND/NT, Soft,   [   ] other:    : [ x  ] NO FRANCES CATHETER, [   ] FRANCES CATHETER present [ x ] other: PureWick  MSK: [ x ] No joint swelling, Full ROM   [   ] other:                                         Ext: [x ]No C/C/E, No calf tenderness,   [ ]other:    Skin: [ x  ]intact,   [   ] other: wound                                               Neurological Examination:  Cognitive: [  x  ] AAO x 3,   [  ]  other:     Attention:  [ x  ] intact   [    ]  other:           Concentration: [ x  ] intact   [    ]  other:   Language: [  ] intact  [ x ] Able to name 3 objects and describe function          Memory: [   ] intact,    [ x ]  other: Impaired, able to recall 1/3 objects   Mood/Affect: [   ] wnl  [  x  ]  other: emotional labile (crying)                                                                          Communication: [ x  ]Fluent, no dysarthria [ ] other:   CN II - XII:  [    ] intact,  [  x  ] other: horizontal  nystagmus noted  with fixation                                                                                        Motor:   RUE: 5/5: shoulder abduction, elbow flexion, elbow extension,  finger flexion  LUE: 4+/5: shoulder abduction, 4+/5 elbow flexion, elbow extension, 4+/5 finger flexion  RLE: 5/5 hip flexion, knee ext, knee flex, ankle dorsiflexion, and ankle  plantarflexion  LLE: 4+/5: hip flexion, knee ext, knee flex, ankle dorsiflexion, and ankle  plantarflexion    Tone: [  x ] wnl,   [    ]  other:  DTRs: [ x ]symmetric, [   ] other:  Coordination:   [    ] intact,   [ x ] other: +FTN, and heel to shin on the L, + L dysdiadokinesia / dysmetria.                                                                     Sensory: [ x  ] Intact to light touch,   [  ] other:    No clonus; No spasticity    MEDICATIONS  (STANDING):  amLODIPine   Tablet 10 milliGRAM(s) Oral daily  aspirin enteric coated 81 milliGRAM(s) Oral daily  atorvastatin 80 milliGRAM(s) Oral at bedtime  carvedilol 6.25 milliGRAM(s) Oral every 12 hours  clopidogrel Tablet 75 milliGRAM(s) Oral daily  dextrose 5% + sodium chloride 0.45%. 1000 milliLiter(s) (75 mL/Hr) IV Continuous <Continuous>  enoxaparin Injectable 40 milliGRAM(s) SubCutaneous every 24 hours  famotidine    Tablet 20 milliGRAM(s) Oral two times a day  lisinopril 5 milliGRAM(s) Oral at bedtime  magnesium oxide 400 milliGRAM(s) Oral three times a day with meals  VOLTAREN  GEL  1%  TOPICAL OINTMENT 1 Application(s) 1 Application(s) Topical three times a day    MEDICATIONS  (PRN):  acetaminophen     Tablet .. 650 milliGRAM(s) Oral every 6 hours PRN Temp greater or equal to 38C (100.4F), Mild Pain (1 - 3)  melatonin 3 milliGRAM(s) Oral at bedtime PRN Insomnia  traMADol 25 milliGRAM(s) Oral every 6 hours PRN severe pain shoulders      RECENT LABS/IMAGING                                      12.8   5.45  )-----------( 201      ( 12 Feb 2024 07:14 )             37.3     02-12    139  |  106  |  12  ----------------------------<  95  3.9   |  24  |  0.6<L>    Ca    9.2      12 Feb 2024 07:14  Mg     1.6     02-12    TPro  5.6<L>  /  Alb  3.3<L>  /  TBili  0.3  /  DBili  x   /  AST  27  /  ALT  36  /  AlkPhos  83  02-12      Urinalysis Basic - ( 12 Feb 2024 07:14 )    Color: x / Appearance: x / SG: x / pH: x  Gluc: 95 mg/dL / Ketone: x  / Bili: x / Urobili: x   Blood: x / Protein: x / Nitrite: x   Leuk Esterase: x / RBC: x / WBC x   Sq Epi: x / Non Sq Epi: x / Bacteria: x      POCT Blood Glucose.: 96 mg/dL (02-11-24 @ 21:04)                Patient is an 83 y old female who presents with a chief complaint of Rehab of left cerebellar stroke with left hemiataxia.(nondominant) (07 Feb 2024 14:25)    HPI:  This is a 82 y/o RHD F with PMHx of HLD, HTN, L knee OA presenting for unsteady gait.    Patient states she was in her normal state of health a day prior to presentation. She was outside having lunch with her friend when she started to notice she was off balance that persists after coming back home. For worsening unsteady gait and imbalance she decided  to seek medical help the next day. She admits to vomiting, but denies any HA, diplopia, N, dizziness, numbness/tingling, speech difficulties. In the ED, vitals were significant for /72. CTH notable for L cerebellar subacute infarct. 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 NIHSS on admission was 3. Patient was admitted to stroke unit for further work up.     On 2/6/2204, the patient's Bp was elevated requiring 15mg of hydralazine overnight. CTH was repeated for possible worsening dysmetria, end gaze nystagmus, and NIHSS increase from 0 to 2 (for upper and lower left extremity dysmetria), urgent CTH showed no intracranial hemorrhage or midline shift. Left cerebellar hypodense lesion consistent with evolving subacute infarct.    The patient was evaluated by the PM&R team once medically stable. The patient was found to have functional limitation in terms of muscle strength, endurance, physical mobility, and ability to carry out activities of daily living (self care, transfers, and ambulation). The patient was started on a course of bedside therapy, the pt is motivated and able to start 3 hours of therapy  daily for 6-7 days a week. With PT/OT,  pt required Min A w transfers, Mod A w UB dressing, Mod A w LB dressing. Amb 15' using RW requiring Mod A. SLP evaluated the patient and recommended regular with thin liquids. The patient was deemed to be a good candidate for admission for acute inpatient rehab. The patient was admitted to acute inpatient rehab on 2/7/24.    (07 Feb 2024 14:25)    I examined the patient and reviewed the chart. There have been no significant changes since the history and physical except where documented below.    TODAY'S SUBJECTIVE & REVIEW OF SYMPTOMS:  Patient was seen and examined at bedside this morning  No acute overnight events reported   Denies any acute complaints, reported watery diarrhea has resolved.  Patient is participating/tolerating therapies well.  Having regular BM and voiding freely.     Vital signs reviewed and stable  Yesterday, Amlodipine was increased  back to 10 mg  daily  since after changing  dose bp in pm time was high ( more than 150 systolic , coreg was reduced to 6.25 mg q12h  to prevent hypotension  Patient COVID-19 positive and remains on isolation. She feels well and has not developed any symptoms.    Constitutional:    [x ] WNL           [   ] poor appetite   [   ] insomnia   [  ] tired   Cardio:                [ x ] WNL           [   ] CP   [  ] BARBOSA   [   ] palpitations               Resp:                   [   ] WNL           [   ] SOB   [ x ] cough-intermittent-improving   [   ] wheezing   GI:                        [ x  ] WNL           [   ] constipation  [   ] diarrhea   [   ] abdominal pain   [   ] nausea   [   ] emesis                                :                      [   ] WNL           [   ] FRANCES  [   ] dysuria   [   ] difficulty voiding   []: Other:    Endo:                   [ x  ] WNL          [   ] polyuria   [   ] temperature intolerance                 Skin:                     [ x  ] WNL          [   ] pain   [   ] wound  [   ] rash   MSK:                    [  ] WNL          [  ] muscle pain   [ x ] joint pain/ stiffness-b/l knee pain L>R -stable  [   ] muscle tenderness   [   ] swelling   Neuro:                 [   ] WNL          [   ] HA   [   ] change in vision   [   ] tremor   [x  ] LSW weakness  [   ]dysphagia            Cognitive:           [ x ] WNL           [  ]confusion      Psych:                  [  x ] WNL           [   ] hallucinations   [   ]agitation   [   ] delusion   [   ]depression    PHYSICAL EXAM  Vital Signs Last 24 Hrs  T(C): 35.7 (13 Feb 2024 05:15), Max: 36.7 (12 Feb 2024 12:55)  T(F): 96.3 (13 Feb 2024 05:15), Max: 98 (12 Feb 2024 12:55)  HR: 60 (13 Feb 2024 05:15) (60 - 69)  BP: 169/77 (13 Feb 2024 05:15) (139/69 - 169/77)  RR: 18 (13 Feb 2024 05:15) (18 - 20)    General:[ x  ] NAD, Resting Comfortable   [   ] other:                                HEENT: [ x  ] NC/AT, EOMI,  Normal Conjunctivae,   [   ] other:   Cardio: [  x ] RRR, no murmur,   [   ] other:                              Pulm: [ x  ] No Respiratory Distress,  Lungs CTAB,   [   ] other:             Abdomen: [ x  ]ND/NT, Soft,   [   ] other:    : [ x  ] NO FRANCES CATHETER, [   ] FRANCES CATHETER present [ x ] other: PureWick  MSK: [ x ] No joint swelling, Full ROM   [   ] other:                                         Ext: [x ]No C/C/E, No calf tenderness,   [ ]other:    Skin: [ x  ]intact,   [   ] other: wound                                               Neurological Examination:  Cognitive: [  x  ] AAO x 3,   [  ]  other:     Attention:  [ x  ] intact   [    ]  other:           Concentration: [ x  ] intact   [    ]  other:   Language: [  ] intact  [ x ] Able to name 3 objects and describe function          Memory: [   ] intact,    [ x ]  other: Impaired, able to recall 1/3 objects   Mood/Affect: [   ] wnl  [  x  ]  other: emotional labile (crying)                                                                          Communication: [ x  ]Fluent, no dysarthria [ ] other:   CN II - XII:  [    ] intact,  [  x  ] other: horizontal  nystagmus noted  with fixation                                                                                        Motor:   RUE: 5/5: shoulder abduction, elbow flexion, elbow extension,  finger flexion  LUE: 4+/5: shoulder abduction, 4+/5 elbow flexion, elbow extension, 4+/5 finger flexion  RLE: 5/5 hip flexion, knee ext, knee flex, ankle dorsiflexion, and ankle  plantarflexion  LLE: 4+/5: hip flexion, knee ext, knee flex, ankle dorsiflexion, and ankle  plantarflexion    Tone: [  x ] wnl,   [    ]  other:  DTRs: [ x ]symmetric, [   ] other:  Coordination:   [    ] intact,   [ x ] other: +FTN, and heel to shin on the L, + L dysdiadokinesia / dysmetria.                                                                     Sensory: [ x  ] Intact to light touch,   [  ] other:    No clonus; No spasticity    MEDICATIONS  (STANDING):  amLODIPine   Tablet 10 milliGRAM(s) Oral daily  aspirin enteric coated 81 milliGRAM(s) Oral daily  atorvastatin 80 milliGRAM(s) Oral at bedtime  carvedilol 6.25 milliGRAM(s) Oral every 12 hours  clopidogrel Tablet 75 milliGRAM(s) Oral daily  dextrose 5% + sodium chloride 0.45%. 1000 milliLiter(s) (75 mL/Hr) IV Continuous <Continuous>  enoxaparin Injectable 40 milliGRAM(s) SubCutaneous every 24 hours  famotidine    Tablet 20 milliGRAM(s) Oral two times a day  lisinopril 5 milliGRAM(s) Oral at bedtime  magnesium oxide 400 milliGRAM(s) Oral three times a day with meals  VOLTAREN  GEL  1%  TOPICAL OINTMENT 1 Application(s) 1 Application(s) Topical three times a day    MEDICATIONS  (PRN):  acetaminophen     Tablet .. 650 milliGRAM(s) Oral every 6 hours PRN Temp greater or equal to 38C (100.4F), Mild Pain (1 - 3)  melatonin 3 milliGRAM(s) Oral at bedtime PRN Insomnia  traMADol 25 milliGRAM(s) Oral every 6 hours PRN severe pain shoulders      RECENT LABS/IMAGING                                      12.8   5.45  )-----------( 201      ( 12 Feb 2024 07:14 )             37.3     02-12    139  |  106  |  12  ----------------------------<  95  3.9   |  24  |  0.6<L>    Ca    9.2      12 Feb 2024 07:14  Mg     1.6     02-12    TPro  5.6<L>  /  Alb  3.3<L>  /  TBili  0.3  /  DBili  x   /  AST  27  /  ALT  36  /  AlkPhos  83  02-12      Urinalysis Basic - ( 12 Feb 2024 07:14 )    Color: x / Appearance: x / SG: x / pH: x  Gluc: 95 mg/dL / Ketone: x  / Bili: x / Urobili: x   Blood: x / Protein: x / Nitrite: x   Leuk Esterase: x / RBC: x / WBC x   Sq Epi: x / Non Sq Epi: x / Bacteria: x      POCT Blood Glucose.: 96 mg/dL (02-11-24 @ 21:04)

## 2024-02-13 NOTE — PROGRESS NOTE ADULT - ATTENDING COMMENTS
Patient seen and examined with the resident. We discussed the case. I have directed the care. I edited the note. The patient requires acute rehab with 3 hours of daily therapies at least 5 out of 7 days and close physiatry follow up.  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 Normal EF  NIHSS+2= Ataxia in 2 limbs on admission to rehab  - S/p Loaded Aspirin 325mg and Plavix 300mg  - C/w Aspirin 81mg and Plavix 75mg daily for 90 days  - F/u EP for ILR placement  - HOB at 45 degrees, aspiration precautions  - PT/OT/ST/ neuropsych eval    #HTN  Home meds: Carvedilol 20 mg extended release PO daily, HCTZ 25 mg PO daily, Amlodipine 10 mg PO daily  - SBP goal: 110-150  - c/w carvedilol 6.25 mg q12hrs  - c/w amlodipine 10 mg once daily   -c/w lisinopril 5 mg at bedtime   -Will monitor and adjust accordingly     #HLD  - c/w lipitor 80 mg daily    #SARS-Cov-2 positive 2/10/2024  -No fever, no WBC count  -Isolation precautions  -Monitor     Diarrhea (2/11)-resolved  -3 loose stool episodes in AM 2/11/2024  -bowel regimen held    #Bilat knee OA/ left knee chronic pain  - no NSAIDS for now  - Tylenol PRN for pain  - Modalities   - consider Voltaren cream and/ or lidoderm patch and Tylenol Patient seen and examined with the resident. We discussed the case. I have directed the care. I edited the note. The patient requires acute rehab with 3 hours of daily therapies at least 5 out of 7 days and close physiatry follow up.  #Rehab for left cerebellar stroke with left hemiataxia.(nondominant), and impaired cognition/ memory  likely due to small vessel dz vs A-A emboli  MRI brain w/out: Acute infarcts involving the left cerebellar hemisphere without hemorrhagic transformation  HbA1c 5.8 , TSH 2.10 and   TTE Mildly enlarged L atria. Normal EF  NIHSS+2= Ataxia in 2 limbs on admission to rehab  - S/p Loaded Aspirin 325mg and Plavix 300mg  - C/w Aspirin 81mg and Plavix 75mg daily for 90 days  - F/u EP for ILR placement  - HOB at 45 degrees, aspiration precautions  - PT/OT/ST/ neuropsych eval    #HTN  Home meds: Carvedilol 20 mg extended release PO daily, HCTZ 25 mg PO daily, Amlodipine 10 mg PO daily  - SBP goal: 110-150  - c/w carvedilol 6.25 mg q12hrs  - c/w amlodipine 10 mg once daily   -c/w lisinopril 5 mg at bedtime   -Will monitor and adjust accordingly     #HLD  - c/w lipitor 80 mg daily    #SARS-Cov-2 positive 2/10/2024  -No fever, no WBC count  -Isolation precautions  -Monitor     Diarrhea (2/11)-resolved  -3 loose stool episodes in AM 2/11/2024  -bowel regimen held    #Bilat knee OA/ left knee chronic pain  - no NSAIDS for now  - Tylenol PRN for pain  - Modalities   - consider Voltaren cream and/ or lidoderm patch and Tylenol

## 2024-02-14 DIAGNOSIS — M17.12 UNILATERAL PRIMARY OSTEOARTHRITIS, LEFT KNEE: ICD-10-CM

## 2024-02-14 DIAGNOSIS — R27.8 OTHER LACK OF COORDINATION: ICD-10-CM

## 2024-02-14 DIAGNOSIS — R29.701 NIHSS SCORE 1: ICD-10-CM

## 2024-02-14 DIAGNOSIS — I63.432 CEREBRAL INFARCTION DUE TO EMBOLISM OF LEFT POSTERIOR CEREBRAL ARTERY: ICD-10-CM

## 2024-02-14 DIAGNOSIS — I10 ESSENTIAL (PRIMARY) HYPERTENSION: ICD-10-CM

## 2024-02-14 DIAGNOSIS — E78.5 HYPERLIPIDEMIA, UNSPECIFIED: ICD-10-CM

## 2024-02-14 RX ADMIN — Medication 81 MILLIGRAM(S): at 12:34

## 2024-02-14 RX ADMIN — AMLODIPINE BESYLATE 10 MILLIGRAM(S): 2.5 TABLET ORAL at 06:11

## 2024-02-14 RX ADMIN — CARVEDILOL PHOSPHATE 6.25 MILLIGRAM(S): 80 CAPSULE, EXTENDED RELEASE ORAL at 06:11

## 2024-02-14 RX ADMIN — CARVEDILOL PHOSPHATE 6.25 MILLIGRAM(S): 80 CAPSULE, EXTENDED RELEASE ORAL at 18:22

## 2024-02-14 RX ADMIN — FAMOTIDINE 20 MILLIGRAM(S): 10 INJECTION INTRAVENOUS at 06:11

## 2024-02-14 RX ADMIN — ATORVASTATIN CALCIUM 80 MILLIGRAM(S): 80 TABLET, FILM COATED ORAL at 21:50

## 2024-02-14 RX ADMIN — LISINOPRIL 5 MILLIGRAM(S): 2.5 TABLET ORAL at 21:50

## 2024-02-14 RX ADMIN — MAGNESIUM OXIDE 400 MG ORAL TABLET 400 MILLIGRAM(S): 241.3 TABLET ORAL at 07:52

## 2024-02-14 RX ADMIN — MAGNESIUM OXIDE 400 MG ORAL TABLET 400 MILLIGRAM(S): 241.3 TABLET ORAL at 18:21

## 2024-02-14 RX ADMIN — MAGNESIUM OXIDE 400 MG ORAL TABLET 400 MILLIGRAM(S): 241.3 TABLET ORAL at 12:34

## 2024-02-14 RX ADMIN — ENOXAPARIN SODIUM 40 MILLIGRAM(S): 100 INJECTION SUBCUTANEOUS at 21:50

## 2024-02-14 RX ADMIN — FAMOTIDINE 20 MILLIGRAM(S): 10 INJECTION INTRAVENOUS at 18:21

## 2024-02-14 RX ADMIN — CLOPIDOGREL BISULFATE 75 MILLIGRAM(S): 75 TABLET, FILM COATED ORAL at 12:34

## 2024-02-14 NOTE — PROGRESS NOTE ADULT - ASSESSMENT
Assessment:  This is a 82 y/o right-handed F with PMHx of HLD, HTN, L knee OA presenting for worsening unsteady gait. In the ED, vitals were significant for /72. CTH notable for L cerebellar subacute infarct. 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 NIHSS on admission was 3 for ataxia L sided and LLE. Patient was admitted to stroke unit for further work up. On 2/6, the patient's Bp was elevated requiring 15mg of hydralazine overnight. CTH was repeated for possible worsening dysmetria, end gaze nystagmus, and NIHSS increase from 0 to 2 (for upper and lower left extremity dysmetria), urgent CTH showed no intracranial hemorrhage or midline shift. Left cerebellar hypodense lesion consistent with evolving subacute infarct.    The patient was evaluated by the PM&R team once medically stable. The patient was found to have functional limitation in terms of muscle strength, endurance, physical mobility, and ability to carry out activities of daily living (self care, transfers, and ambulation). The patient was started on a course of bedside therapy, the pt is motivated and able to start 3 hours of therapy  daily for 6-7 days a week. With PT/OT, pt required Min A w transfers, Mod A w UB dressing, Mod A w LB dressing. Amb 15' using RW requiring Mod A.  SLP evaluated the patient and recommended regular with thin liquids.The patient was deemed to be a good candidate for admission for acute inpatient rehab. The patient was admitted to acute inpatient rehab on 2/7/24.     Plan:  #Rehab for left cerebellar stroke with left hemiataxia.(nondominanat), and impaired cognition/ memory  likely due to small vessel dz vs A-A emboli  MRI brain w/out: Acute infarcts involving the left cerebellar hemisphere without hemorrhagic transformation  HbA1c 5.8 , TSH 2.10 and   TTE Mildly enlarged L atria. Normal EF  NIHSS+2= Ataxia in 2 limbs on admission to rehab  - S/p Loaded Aspirin 325mg and Plavix 300mg  - C/w Aspirin 81mg and Plavix 75mg daily for 90 days  - F/u EP for ILR placement  - HOB at 45 degrees, aspiration precautions  - PT/OT/ST/ neuropsych eval    #HTN  Home meds: Carvedilol 20 mg extended release PO daily, HCTZ 25 mg PO daily, Amlodipine 10 mg PO daily  - SBP goal: 110-150  - At the present time, no need to restart HCTZ 25 mg   - c/w carvedilol 6.25 mg q12hrs  - c/w amlodipine 10 mg once daily   -c/w lisinopril 5 mg at bedtime   -Will monitor and adjust accordingly     #HLD  - c/w lipitor 80 mg daily    #SARS-Cov-2 positive 2/10/2024  -No fever, no WBC count  -Isolation precautions  -Monitor     Diarrhea (2/11)-resolved  -3 loose stool episodes in AM 2/11/2024  -bowel regimen held    #Bilat knee OA/ left knee chronic pain  - no NSAIDS for now  - Tylenol PRN for pain  - Modalities   - consider Voltaren cream and/ or lidoderm patch and Tylenol    #GI/Bowel Mgmt:/ Constipation  - finally had BM today with relief  - Miralax, senna (held 2/2 diarrhea)     -FEN : Monitor Na levels    - Diet: DASH/TLC    Precautions / PROPHYLAXIS:      - Falls      - DVT prophylaxis: Lovenox

## 2024-02-14 NOTE — PROGRESS NOTE ADULT - ATTENDING COMMENTS
Patient seen and examined with the resident. We discussed the case. I have directed the care. I edited the note. The patient requires acute rehab with 3 hours of daily therapies at least 5 out of 7 days and close physiatry follow up. I participated in the rehab interdisciplinary team meeting; the patient progressed to ambulate 20 ft RW partial assist. She has some anxiety and emotional lability. She denies depression. She isn't interested in taking a serotonin reuptake inhibitor. She can benefit from continued acute rehab. If she test neg for COVID on day 7 and 8 she would be able to come off quarantine.  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 Normal EF  NIHSS+2= Ataxia in 2 limbs on admission to rehab  - S/p Loaded Aspirin 325mg and Plavix 300mg  - C/w Aspirin 81mg and Plavix 75mg daily for 90 days  - F/u EP for ILR placement  - HOB at 45 degrees, aspiration precautions  - PT/OT/ST/ neuropsych eval    #HTN  Home meds: Carvedilol 20 mg extended release PO daily, HCTZ 25 mg PO daily, Amlodipine 10 mg PO daily  - SBP goal: 110-150  - At the present time, no need to restart HCTZ 25 mg   - c/w carvedilol 6.25 mg q12hrs  - c/w amlodipine 10 mg once daily   -c/w lisinopril 5 mg at bedtime   -Will monitor and adjust accordingly     #HLD  - c/w lipitor 80 mg daily    #SARS-Cov-2 positive 2/10/2024  -No fever, no WBC count  -Isolation precautions  -Monitor     Diarrhea (2/11)-resolved  -3 loose stool episodes in AM 2/11/2024  -bowel regimen held    #Bilat knee OA/ left knee chronic pain  - no NSAIDS for now  - Tylenol PRN for pain  - Modalities   - consider Voltaren cream and/ or lidoderm patch and Tylenol    #GI/Bowel Mgmt:/ Constipation  - finally had BM today with relief  - Miralax, senna (held 2/2 diarrhea)     -FEN : Monitor Na levels

## 2024-02-14 NOTE — PROGRESS NOTE ADULT - SUBJECTIVE AND OBJECTIVE BOX
Patient is an 83 y old female who presents with a chief complaint of Rehab of left cerebellar stroke with left hemiataxia.(nondominant) (07 Feb 2024 14:25)    HPI:  This is a 84 y/o RHD F with PMHx of HLD, HTN, L knee OA presenting for unsteady gait.    Patient states she was in her normal state of health a day prior to presentation. She was outside having lunch with her friend when she started to notice she was off balance that persists after coming back home. For worsening unsteady gait and imbalance she decided  to seek medical help the next day. She admits to vomiting, but denies any HA, diplopia, N, dizziness, numbness/tingling, speech difficulties. In the ED, vitals were significant for /72. CTH notable for L cerebellar subacute infarct. 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 NIHSS on admission was 3. Patient was admitted to stroke unit for further work up.     On 2/6/2204, the patient's Bp was elevated requiring 15mg of hydralazine overnight. CTH was repeated for possible worsening dysmetria, end gaze nystagmus, and NIHSS increase from 0 to 2 (for upper and lower left extremity dysmetria), urgent CTH showed no intracranial hemorrhage or midline shift. Left cerebellar hypodense lesion consistent with evolving subacute infarct.    The patient was evaluated by the PM&R team once medically stable. The patient was found to have functional limitation in terms of muscle strength, endurance, physical mobility, and ability to carry out activities of daily living (self care, transfers, and ambulation). The patient was started on a course of bedside therapy, the pt is motivated and able to start 3 hours of therapy  daily for 6-7 days a week. With PT/OT,  pt required Min A w transfers, Mod A w UB dressing, Mod A w LB dressing. Amb 15' using RW requiring Mod A. SLP evaluated the patient and recommended regular with thin liquids. The patient was deemed to be a good candidate for admission for acute inpatient rehab. The patient was admitted to acute inpatient rehab on 2/7/24.    (07 Feb 2024 14:25)    I examined the patient and reviewed the chart. There have been no significant changes since the history and physical except where documented below.    TODAY'S SUBJECTIVE & REVIEW OF SYMPTOMS:  Patient was seen and examined at bedside this morning.  No acute overnight events. Reported watery diarrhea has resolved. No new complaints offered.   Patient in good spirits and participating/tolerating therapies well. Voiding spontaneously and has regular BM. Vital signs stable.    Vital signs reviewed and stable  Patient COVID-19 positive and remains on isolation. She feels well and has not developed any symptoms.    Constitutional:    [x ] WNL           [   ] poor appetite   [   ] insomnia   [  ] tired   Cardio:                [ x ] WNL           [   ] CP   [  ] BARBOSA   [   ] palpitations               Resp:                   [ x  ] WNL           [   ] SOB   [  ] cough  [   ] wheezing   GI:                        [ x  ] WNL           [   ] constipation  [   ] diarrhea   [   ] abdominal pain   [   ] nausea   [   ] emesis                                :                      [   ] WNL           [   ] FRANCES  [   ] dysuria   [   ] difficulty voiding   []: Other:    Endo:                   [ x  ] WNL          [   ] polyuria   [   ] temperature intolerance                 Skin:                     [ x  ] WNL          [   ] pain   [   ] wound  [   ] rash   MSK:                    [  ] WNL          [  ] muscle pain   [ x ] joint pain/ stiffness-b/l knee pain L>R -stable  [   ] muscle tenderness   [   ] swelling   Neuro:                 [   ] WNL          [   ] HA   [   ] change in vision   [   ] tremor   [x  ] LSW weakness  [   ]dysphagia            Cognitive:           [ x ] WNL           [  ]confusion      Psych:                  [  x ] WNL           [   ] hallucinations   [   ]agitation   [   ] delusion   [   ]depression    PHYSICAL EXAM  Vital Signs Last 24 Hrs  T(C): 35.8 (14 Feb 2024 04:44), Max: 36.3 (13 Feb 2024 13:41)  T(F): 96.4 (14 Feb 2024 04:44), Max: 97.4 (13 Feb 2024 13:41)  HR: 60 (14 Feb 2024 04:44) (58 - 70)  BP: 133/79 (14 Feb 2024 04:44) (116/57 - 138/66)  BP(mean): 98 (13 Feb 2024 18:10) (98 - 98)  RR: 18 (14 Feb 2024 04:44) (18 - 18)  SpO2: 97% (13 Feb 2024 18:10) (97% - 97%)    Parameters below as of 13 Feb 2024 18:10  Patient On (Oxygen Delivery Method): room air    General:[ x  ] NAD, Resting Comfortable   [   ] other:                                HEENT: [ x  ] NC/AT, EOMI,  Normal Conjunctivae,   [   ] other:   Cardio: [  x ] RRR, no murmur,   [   ] other:                              Pulm: [ x  ] No Respiratory Distress,  Lungs CTAB,   [   ] other:             Abdomen: [ x  ]ND/NT, Soft,   [   ] other:    : [ x  ] NO FRANCES CATHETER, [   ] FRANCES CATHETER present [ x ] other: PureWick  MSK: [ x ] No joint swelling, Full ROM   [   ] other:                                         Ext: [x ]No C/C/E, No calf tenderness,   [ ]other:    Skin: [ x  ]intact,   [   ] other: wound                                               Neurological Examination:  Cognitive: [  x  ] AAO x 3,   [  ]  other:     Attention:  [ x  ] intact   [    ]  other:           Concentration: [ x  ] intact   [    ]  other:   Language: [  ] intact  [ x ] Able to name 3 objects and describe function          Memory: [   ] intact,    [ x ]  other: Impaired, able to recall 1/3 objects   Mood/Affect: [   ] wnl  [  x  ]  other: emotional labile (crying)                                                                          Communication: [ x  ]Fluent, no dysarthria [ ] other:   CN II - XII:  [    ] intact,  [  x  ] other: horizontal  nystagmus noted  with fixation                                                                                        Motor:   RUE: 5/5: shoulder abduction, elbow flexion, elbow extension,  finger flexion  LUE: 4+/5: shoulder abduction, 4+/5 elbow flexion, elbow extension, 4+/5 finger flexion  RLE: 5/5 hip flexion, knee ext, knee flex, ankle dorsiflexion, and ankle  plantarflexion  LLE: 4+/5: hip flexion, knee ext, knee flex, ankle dorsiflexion, and ankle  plantarflexion    Tone: [  x ] wnl,   [    ]  other:  DTRs: [ x ]symmetric, [   ] other:  Coordination:   [    ] intact,   [ x ] other: +FTN, and heel to shin on the L, + L dysdiadokinesia / dysmetria.                                                                     Sensory: [ x  ] Intact to light touch,   [  ] other:    No clonus; No spasticity    MEDICATIONS  (STANDING):  amLODIPine   Tablet 10 milliGRAM(s) Oral daily  aspirin enteric coated 81 milliGRAM(s) Oral daily  atorvastatin 80 milliGRAM(s) Oral at bedtime  carvedilol 6.25 milliGRAM(s) Oral every 12 hours  clopidogrel Tablet 75 milliGRAM(s) Oral daily  dextrose 5% + sodium chloride 0.45%. 1000 milliLiter(s) (75 mL/Hr) IV Continuous <Continuous>  enoxaparin Injectable 40 milliGRAM(s) SubCutaneous every 24 hours  famotidine    Tablet 20 milliGRAM(s) Oral two times a day  lisinopril 5 milliGRAM(s) Oral at bedtime  magnesium oxide 400 milliGRAM(s) Oral three times a day with meals  VOLTAREN  GEL  1%  TOPICAL OINTMENT 1 Application(s) 1 Application(s) Topical three times a day    MEDICATIONS  (PRN):  acetaminophen     Tablet .. 650 milliGRAM(s) Oral every 6 hours PRN Temp greater or equal to 38C (100.4F), Mild Pain (1 - 3)  melatonin 3 milliGRAM(s) Oral at bedtime PRN Insomnia  traMADol 25 milliGRAM(s) Oral every 6 hours PRN severe pain shoulders      RECENT LABS/IMAGING                                    12.8   5.45  )-----------( 201      ( 12 Feb 2024 07:14 )             37.3     02-12    139  |  106  |  12  ----------------------------<  95  3.9   |  24  |  0.6<L>    Ca    9.2      12 Feb 2024 07:14  Mg     1.6     02-12    TPro  5.6<L>  /  Alb  3.3<L>  /  TBili  0.3  /  DBili  x   /  AST  27  /  ALT  36  /  AlkPhos  83  02-12      Urinalysis Basic - ( 12 Feb 2024 07:14 )    Color: x / Appearance: x / SG: x / pH: x  Gluc: 95 mg/dL / Ketone: x  / Bili: x / Urobili: x   Blood: x / Protein: x / Nitrite: x   Leuk Esterase: x / RBC: x / WBC x   Sq Epi: x / Non Sq Epi: x / Bacteria: x    POCT Blood Glucose.: 96 mg/dL (02-11-24 @ 21:04)

## 2024-02-14 NOTE — PROGRESS NOTE ADULT - TIME BILLING
Negative mammo - repeat 1 year. Patient seen and examined with the resident. We discussed the case. I have directed the care. I edited the note. The patient requires acute rehab with 3 hours of daily therapies at least 5 out of 7 days and close physiatry follow up. I participated in the rehab interdisciplinary team meeting; the patient progressed to ambulate 20 ft RW partial assist. She has some anxiety and emotional lability. She denies depression. She isn't interested in taking a serotonin reuptake inhibitor. She can benefit from continued acute rehab. If she test neg for COVID on day 7 and 8 she would be able to come off quarantine.

## 2024-02-15 RX ORDER — TRAMADOL HYDROCHLORIDE 50 MG/1
25 TABLET ORAL EVERY 6 HOURS
Refills: 0 | Status: DISCONTINUED | OUTPATIENT
Start: 2024-02-16 | End: 2024-02-16

## 2024-02-15 RX ADMIN — LISINOPRIL 5 MILLIGRAM(S): 2.5 TABLET ORAL at 21:43

## 2024-02-15 RX ADMIN — FAMOTIDINE 20 MILLIGRAM(S): 10 INJECTION INTRAVENOUS at 06:02

## 2024-02-15 RX ADMIN — AMLODIPINE BESYLATE 10 MILLIGRAM(S): 2.5 TABLET ORAL at 06:03

## 2024-02-15 RX ADMIN — FAMOTIDINE 20 MILLIGRAM(S): 10 INJECTION INTRAVENOUS at 17:30

## 2024-02-15 RX ADMIN — ENOXAPARIN SODIUM 40 MILLIGRAM(S): 100 INJECTION SUBCUTANEOUS at 21:43

## 2024-02-15 RX ADMIN — ATORVASTATIN CALCIUM 80 MILLIGRAM(S): 80 TABLET, FILM COATED ORAL at 21:43

## 2024-02-15 RX ADMIN — CLOPIDOGREL BISULFATE 75 MILLIGRAM(S): 75 TABLET, FILM COATED ORAL at 12:36

## 2024-02-15 RX ADMIN — CARVEDILOL PHOSPHATE 6.25 MILLIGRAM(S): 80 CAPSULE, EXTENDED RELEASE ORAL at 17:30

## 2024-02-15 RX ADMIN — MAGNESIUM OXIDE 400 MG ORAL TABLET 400 MILLIGRAM(S): 241.3 TABLET ORAL at 08:13

## 2024-02-15 RX ADMIN — MAGNESIUM OXIDE 400 MG ORAL TABLET 400 MILLIGRAM(S): 241.3 TABLET ORAL at 12:36

## 2024-02-15 RX ADMIN — CARVEDILOL PHOSPHATE 6.25 MILLIGRAM(S): 80 CAPSULE, EXTENDED RELEASE ORAL at 06:02

## 2024-02-15 RX ADMIN — MAGNESIUM OXIDE 400 MG ORAL TABLET 400 MILLIGRAM(S): 241.3 TABLET ORAL at 17:30

## 2024-02-15 RX ADMIN — Medication 81 MILLIGRAM(S): at 12:36

## 2024-02-15 NOTE — PROGRESS NOTE ADULT - SUBJECTIVE AND OBJECTIVE BOX
Patient is an 83 y old female who presents with a chief complaint of Rehab of left cerebellar stroke with left hemiataxia.(nondominant) (07 Feb 2024 14:25)    HPI:  This is a 84 y/o RHD F with PMHx of HLD, HTN, L knee OA presenting for unsteady gait.    Patient states she was in her normal state of health a day prior to presentation. She was outside having lunch with her friend when she started to notice she was off balance that persists after coming back home. For worsening unsteady gait and imbalance she decided  to seek medical help the next day. She admits to vomiting, but denies any HA, diplopia, N, dizziness, numbness/tingling, speech difficulties. In the ED, vitals were significant for /72. CTH notable for L cerebellar subacute infarct. 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 NIHSS on admission was 3. Patient was admitted to stroke unit for further work up.     On 2/6/2204, the patient's Bp was elevated requiring 15mg of hydralazine overnight. CTH was repeated for possible worsening dysmetria, end gaze nystagmus, and NIHSS increase from 0 to 2 (for upper and lower left extremity dysmetria), urgent CTH showed no intracranial hemorrhage or midline shift. Left cerebellar hypodense lesion consistent with evolving subacute infarct.    The patient was evaluated by the PM&R team once medically stable. The patient was found to have functional limitation in terms of muscle strength, endurance, physical mobility, and ability to carry out activities of daily living (self care, transfers, and ambulation). The patient was started on a course of bedside therapy, the pt is motivated and able to start 3 hours of therapy  daily for 6-7 days a week. With PT/OT,  pt required Min A w transfers, Mod A w UB dressing, Mod A w LB dressing. Amb 15' using RW requiring Mod A. SLP evaluated the patient and recommended regular with thin liquids. The patient was deemed to be a good candidate for admission for acute inpatient rehab. The patient was admitted to acute inpatient rehab on 2/7/24.    (07 Feb 2024 14:25)      TODAY'S SUBJECTIVE & REVIEW OF SYMPTOMS:  Patient was seen and examined at bedside this morning.  No acute overnight events. Reported watery diarrhea has resolved. No new complaints offered.   Patient in good spirits and participating/tolerating therapies well. Voiding spontaneously and has regular BM.     Vital signs reviewed, for elevated Bp, home medication HCTZ will be resumed in a lower dose 12.5 mg and titrate up gradually if needed  Patient COVID-19 positive and remains on isolation. She feels well and has not developed any symptoms.    Constitutional:    [x ] WNL           [   ] poor appetite   [   ] insomnia   [  ] tired   Cardio:                [ x ] WNL           [   ] CP   [  ] BARBOSA   [   ] palpitations               Resp:                   [ x  ] WNL           [   ] SOB   [  ] cough  [   ] wheezing   GI:                        [ x  ] WNL           [   ] constipation  [   ] diarrhea   [   ] abdominal pain   [   ] nausea   [   ] emesis                                :                      [   ] WNL           [   ] FRANCES  [   ] dysuria   [   ] difficulty voiding   []: Other:    Endo:                   [ x  ] WNL          [   ] polyuria   [   ] temperature intolerance                 Skin:                     [ x  ] WNL          [   ] pain   [   ] wound  [   ] rash   MSK:                    [  ] WNL          [  ] muscle pain   [ x ] joint pain/ stiffness-b/l knee pain L>R -stable  [   ] muscle tenderness   [   ] swelling   Neuro:                 [   ] WNL          [   ] HA   [   ] change in vision   [   ] tremor   [x  ] LSW weakness  [   ]dysphagia            Cognitive:           [ x ] WNL           [  ]confusion      Psych:                  [  x ] WNL           [   ] hallucinations   [   ]agitation   [   ] delusion   [   ]depression    PHYSICAL EXAM  Vital Signs Last 24 Hrs  T(C): 36.1 (15 Feb 2024 05:22), Max: 36.9 (14 Feb 2024 22:24)  T(F): 97 (15 Feb 2024 05:22), Max: 98.4 (14 Feb 2024 22:24)  HR: 65 (15 Feb 2024 06:43) (64 - 75)  BP: 176/75 (15 Feb 2024 06:43) (130/71 - 176/75)  BP(mean): 90 (14 Feb 2024 18:15) (90 - 90)  RR: 20 (15 Feb 2024 05:22) (18 - 20)    General:[ x  ] NAD, Resting Comfortable   [   ] other:                                HEENT: [ x  ] NC/AT, EOMI,  Normal Conjunctivae,   [   ] other:   Cardio: [  x ] RRR, no murmur,   [   ] other:                              Pulm: [ x  ] No Respiratory Distress,  Lungs CTAB,   [   ] other:             Abdomen: [ x  ]ND/NT, Soft,   [   ] other:    : [ x  ] NO FRANCES CATHETER, [   ] FRANCES CATHETER present [ x ] other: PureWick  MSK: [ x ] No joint swelling, Full ROM   [   ] other:                                         Ext: [x ]No C/C/E, No calf tenderness,   [ ]other:    Skin: [ x  ]intact,   [   ] other: wound                                               Neurological Examination:  Cognitive: [  x  ] AAO x 3,   [  ]  other:     Attention:  [ x  ] intact   [    ]  other:           Concentration: [ x  ] intact   [    ]  other:   Language: [  ] intact  [ x ] Able to name 3 objects and describe function          Memory: [   ] intact,    [ x ]  other: Impaired, able to recall 1/3 objects   Mood/Affect: [   ] wnl  [  x  ]  other: emotional labile (crying)                                                                          Communication: [ x  ]Fluent, no dysarthria [ ] other:   CN II - XII:  [    ] intact,  [  x  ] other: horizontal  nystagmus noted  with fixation                                                                                        Motor:   RUE: 5/5: shoulder abduction, elbow flexion, elbow extension,  finger flexion  LUE: 4+/5: shoulder abduction, 4+/5 elbow flexion, elbow extension, 4+/5 finger flexion  RLE: 5/5 hip flexion, knee ext, knee flex, ankle dorsiflexion, and ankle  plantarflexion  LLE: 4+/5: hip flexion, knee ext, knee flex, ankle dorsiflexion, and ankle  plantarflexion    Tone: [  x ] wnl,   [    ]  other:  DTRs: [ x ]symmetric, [   ] other:  Coordination:   [    ] intact,   [ x ] other: +FTN, and heel to shin on the L, + L dysdiadokinesia / dysmetria.                                                                     Sensory: [ x  ] Intact to light touch,   [  ] other:    No clonus; No spasticity    MEDICATIONS  (STANDING):  amLODIPine   Tablet 10 milliGRAM(s) Oral daily  aspirin enteric coated 81 milliGRAM(s) Oral daily  atorvastatin 80 milliGRAM(s) Oral at bedtime  carvedilol 6.25 milliGRAM(s) Oral every 12 hours  clopidogrel Tablet 75 milliGRAM(s) Oral daily  dextrose 5% + sodium chloride 0.45%. 1000 milliLiter(s) (75 mL/Hr) IV Continuous <Continuous>  enoxaparin Injectable 40 milliGRAM(s) SubCutaneous every 24 hours  famotidine    Tablet 20 milliGRAM(s) Oral two times a day  hydrochlorothiazide 12.5 milliGRAM(s) Oral daily  lisinopril 5 milliGRAM(s) Oral at bedtime  magnesium oxide 400 milliGRAM(s) Oral three times a day with meals  VOLTAREN  GEL  1%  TOPICAL OINTMENT 1 Application(s) 1 Application(s) Topical three times a day    MEDICATIONS  (PRN):  acetaminophen     Tablet .. 650 milliGRAM(s) Oral every 6 hours PRN Temp greater or equal to 38C (100.4F), Mild Pain (1 - 3)  melatonin 3 milliGRAM(s) Oral at bedtime PRN Insomnia  traMADol 25 milliGRAM(s) Oral every 6 hours PRN severe pain shoulders      RECENT LABS/IMAGING                                  12.8   5.45  )-----------( 201      ( 12 Feb 2024 07:14 )             37.3     02-12    139  |  106  |  12  ----------------------------<  95  3.9   |  24  |  0.6<L>    Ca    9.2      12 Feb 2024 07:14  Mg     1.6     02-12    TPro  5.6<L>  /  Alb  3.3<L>  /  TBili  0.3  /  DBili  x   /  AST  27  /  ALT  36  /  AlkPhos  83  02-12      Urinalysis Basic - ( 12 Feb 2024 07:14 )    Color: x / Appearance: x / SG: x / pH: x  Gluc: 95 mg/dL / Ketone: x  / Bili: x / Urobili: x   Blood: x / Protein: x / Nitrite: x   Leuk Esterase: x / RBC: x / WBC x   Sq Epi: x / Non Sq Epi: x / Bacteria: x    POCT Blood Glucose.: 96 mg/dL (02-11-24 @ 21:04)                Patient is an 83 y old female who presents with a chief complaint of Rehab of left cerebellar stroke with left hemiataxia.(nondominant) (07 Feb 2024 14:25)    HPI:  This is a 82 y/o RHD F with PMHx of HLD, HTN, L knee OA presenting for unsteady gait.    Patient states she was in her normal state of health a day prior to presentation. She was outside having lunch with her friend when she started to notice she was off balance that persists after coming back home. For worsening unsteady gait and imbalance she decided  to seek medical help the next day. She admits to vomiting, but denies any HA, diplopia, N, dizziness, numbness/tingling, speech difficulties. In the ED, vitals were significant for /72. CTH notable for L cerebellar subacute infarct. 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 NIHSS on admission was 3. Patient was admitted to stroke unit for further work up.     On 2/6/2204, the patient's Bp was elevated requiring 15mg of hydralazine overnight. CTH was repeated for possible worsening dysmetria, end gaze nystagmus, and NIHSS increase from 0 to 2 (for upper and lower left extremity dysmetria), urgent CTH showed no intracranial hemorrhage or midline shift. Left cerebellar hypodense lesion consistent with evolving subacute infarct.    The patient was evaluated by the PM&R team once medically stable. The patient was found to have functional limitation in terms of muscle strength, endurance, physical mobility, and ability to carry out activities of daily living (self care, transfers, and ambulation). The patient was started on a course of bedside therapy, the pt is motivated and able to start 3 hours of therapy  daily for 6-7 days a week. With PT/OT,  pt required Min A w transfers, Mod A w UB dressing, Mod A w LB dressing. Amb 15' using RW requiring Mod A. SLP evaluated the patient and recommended regular with thin liquids. The patient was deemed to be a good candidate for admission for acute inpatient rehab. The patient was admitted to acute inpatient rehab on 2/7/24.    (07 Feb 2024 14:25)      TODAY'S SUBJECTIVE & REVIEW OF SYMPTOMS:  Patient was seen and examined at bedside this morning.  No acute overnight events. Reported watery diarrhea has resolved. No new complaints offered.   Patient in good spirits and participating/tolerating therapies well. Voiding spontaneously and has regular BM.     Vital signs reviewed, for elevated Bp, home medication HCTZ will be resumed in a lower dose 12.5 mg and titrate up gradually if needed  Patient COVID-19 positive and remains on isolation. She feels well and has not developed any symptoms.  CLOF: PA for transfers, PA for UB dressing, Mod A for LB dressing. Ambulates 20ft with RW requiring PA, negotiated 1 steps w/2HR requiring Max A    Constitutional:    [x ] WNL           [   ] poor appetite   [   ] insomnia   [  ] tired   Cardio:                [ x ] WNL           [   ] CP   [  ] BARBOSA   [   ] palpitations               Resp:                   [ x  ] WNL           [   ] SOB   [  ] cough  [   ] wheezing   GI:                        [ x  ] WNL           [   ] constipation  [   ] diarrhea   [   ] abdominal pain   [   ] nausea   [   ] emesis                                :                      [   ] WNL           [   ] FRANCES  [   ] dysuria   [   ] difficulty voiding   []: Other:    Endo:                   [ x  ] WNL          [   ] polyuria   [   ] temperature intolerance                 Skin:                     [ x  ] WNL          [   ] pain   [   ] wound  [   ] rash   MSK:                    [  ] WNL          [  ] muscle pain   [ x ] joint pain/ stiffness-b/l knee pain L>R -stable  [   ] muscle tenderness   [   ] swelling   Neuro:                 [   ] WNL          [   ] HA   [   ] change in vision   [   ] tremor   [x  ] LSW weakness  [   ]dysphagia            Cognitive:           [ x ] WNL           [  ]confusion      Psych:                  [  x ] WNL           [   ] hallucinations   [   ]agitation   [   ] delusion   [   ]depression    PHYSICAL EXAM  Vital Signs Last 24 Hrs  T(C): 36.1 (15 Feb 2024 05:22), Max: 36.9 (14 Feb 2024 22:24)  T(F): 97 (15 Feb 2024 05:22), Max: 98.4 (14 Feb 2024 22:24)  HR: 65 (15 Feb 2024 06:43) (64 - 75)  BP: 176/75 (15 Feb 2024 06:43) (130/71 - 176/75)  BP(mean): 90 (14 Feb 2024 18:15) (90 - 90)  RR: 20 (15 Feb 2024 05:22) (18 - 20)    General:[ x  ] NAD, Resting Comfortable   [   ] other:                                HEENT: [ x  ] NC/AT, EOMI,  Normal Conjunctivae,   [   ] other:   Cardio: [  x ] RRR, no murmur,   [   ] other:                              Pulm: [ x  ] No Respiratory Distress,  Lungs CTAB,   [   ] other:             Abdomen: [ x  ]ND/NT, Soft,   [   ] other:    : [ x  ] NO FRANCES CATHETER, [   ] FRANCES CATHETER present [ x ] other: PureWick  MSK: [ x ] No joint swelling, Full ROM   [   ] other:                                         Ext: [x ]No C/C/E, No calf tenderness,   [ ]other:    Skin: [ x  ]intact,   [   ] other: wound                                               Neurological Examination:  Cognitive: [  x  ] AAO x 3,   [  ]  other:     Attention:  [ x  ] intact   [    ]  other:           Concentration: [ x  ] intact   [    ]  other:   Language: [  ] intact  [ x ] Able to name 3 objects and describe function          Memory: [   ] intact,    [ x ]  other: Impaired, able to recall 1/3 objects   Mood/Affect: [   ] wnl  [  x  ]  other: emotional labile (crying)                                                                          Communication: [ x  ]Fluent, no dysarthria [ ] other:   CN II - XII:  [    ] intact,  [  x  ] other: horizontal  nystagmus noted  with fixation                                                                                        Motor:   RUE: 5/5: shoulder abduction, elbow flexion, elbow extension,  finger flexion  LUE: 4+/5: shoulder abduction, 4+/5 elbow flexion, elbow extension, 4+/5 finger flexion  RLE: 5/5 hip flexion, knee ext, knee flex, ankle dorsiflexion, and ankle  plantarflexion  LLE: 4+/5: hip flexion, knee ext, knee flex, ankle dorsiflexion, and ankle  plantarflexion    Tone: [  x ] wnl,   [    ]  other:  DTRs: [ x ]symmetric, [   ] other:  Coordination:   [    ] intact,   [ x ] other: +FTN, and heel to shin on the L, + L dysdiadokinesia / dysmetria.                                                                     Sensory: [ x  ] Intact to light touch,   [  ] other:    No clonus; No spasticity    MEDICATIONS  (STANDING):  amLODIPine   Tablet 10 milliGRAM(s) Oral daily  aspirin enteric coated 81 milliGRAM(s) Oral daily  atorvastatin 80 milliGRAM(s) Oral at bedtime  carvedilol 6.25 milliGRAM(s) Oral every 12 hours  clopidogrel Tablet 75 milliGRAM(s) Oral daily  dextrose 5% + sodium chloride 0.45%. 1000 milliLiter(s) (75 mL/Hr) IV Continuous <Continuous>  enoxaparin Injectable 40 milliGRAM(s) SubCutaneous every 24 hours  famotidine    Tablet 20 milliGRAM(s) Oral two times a day  hydrochlorothiazide 12.5 milliGRAM(s) Oral daily  lisinopril 5 milliGRAM(s) Oral at bedtime  magnesium oxide 400 milliGRAM(s) Oral three times a day with meals  VOLTAREN  GEL  1%  TOPICAL OINTMENT 1 Application(s) 1 Application(s) Topical three times a day    MEDICATIONS  (PRN):  acetaminophen     Tablet .. 650 milliGRAM(s) Oral every 6 hours PRN Temp greater or equal to 38C (100.4F), Mild Pain (1 - 3)  melatonin 3 milliGRAM(s) Oral at bedtime PRN Insomnia  traMADol 25 milliGRAM(s) Oral every 6 hours PRN severe pain shoulders      RECENT LABS/IMAGING                                  12.8   5.45  )-----------( 201      ( 12 Feb 2024 07:14 )             37.3     02-12    139  |  106  |  12  ----------------------------<  95  3.9   |  24  |  0.6<L>    Ca    9.2      12 Feb 2024 07:14  Mg     1.6     02-12    TPro  5.6<L>  /  Alb  3.3<L>  /  TBili  0.3  /  DBili  x   /  AST  27  /  ALT  36  /  AlkPhos  83  02-12      Urinalysis Basic - ( 12 Feb 2024 07:14 )    Color: x / Appearance: x / SG: x / pH: x  Gluc: 95 mg/dL / Ketone: x  / Bili: x / Urobili: x   Blood: x / Protein: x / Nitrite: x   Leuk Esterase: x / RBC: x / WBC x   Sq Epi: x / Non Sq Epi: x / Bacteria: x    POCT Blood Glucose.: 96 mg/dL (02-11-24 @ 21:04)

## 2024-02-15 NOTE — PROGRESS NOTE ADULT - ASSESSMENT
Neuropsychology Follow-Up       Treatment Session Focused on Mood    Pain: Denied Location:  NA Ratin/10     Intervention: NA   Patient reported GI discomfort described as “minimal, not even pain;” nursing notified   Orientation: WFL    Arousal Level: Alert   Behavior: Cooperative, pleasant   Affect Range:  WFL, euthymic and mood congruent      Needed:  No  #: N/A   Attention: WFL    Insight into illness/deficits: Good     Patient was seen for a follow up session to address mood/adjustment to continued hospital stay given tearfulness at times reported by therapy providers in other disciplines.  Patient appeared to be in good spirits, and reported her mood as “great.”  She admitted to tearfulness early on in admission, but noted that her mood has been much improved as of late and she continues to make gains in therapy.  Progress and goals were reviewed.  Patient described some frustration with being on isolation and GI discomfort noted above, but stated she remains positive.  She demonstrated much future-oritented thinking and described several protective factors (e.g., spending time and enjoy meals with family and friends; her own and family members’ pets).  Support provided with additional discussion today reviewing relaxation techniques/visualization.     The plan is to continue to support cognition and mood /adjustment     Goals: ? Facilitate Positive Coping ; cognitive assessment/remediation   Plan: 1-2 times a week 30-60 minutes  Oriented - self; Oriented - place; Oriented - time

## 2024-02-15 NOTE — PROGRESS NOTE ADULT - ASSESSMENT
Assessment:  This is a 84 y/o right-handed F with PMHx of HLD, HTN, L knee OA presenting for worsening unsteady gait. In the ED, vitals were significant for /72. CTH notable for L cerebellar subacute infarct. 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 NIHSS on admission was 3 for ataxia L sided and LLE. Patient was admitted to stroke unit for further work up. On 2/6, the patient's Bp was elevated requiring 15mg of hydralazine overnight. CTH was repeated for possible worsening dysmetria, end gaze nystagmus, and NIHSS increase from 0 to 2 (for upper and lower left extremity dysmetria), urgent CTH showed no intracranial hemorrhage or midline shift. Left cerebellar hypodense lesion consistent with evolving subacute infarct.    The patient was evaluated by the PM&R team once medically stable. The patient was found to have functional limitation in terms of muscle strength, endurance, physical mobility, and ability to carry out activities of daily living (self care, transfers, and ambulation). The patient was started on a course of bedside therapy, the pt is motivated and able to start 3 hours of therapy  daily for 6-7 days a week. With PT/OT, pt required Min A w transfers, Mod A w UB dressing, Mod A w LB dressing. Amb 15' using RW requiring Mod A.  SLP evaluated the patient and recommended regular with thin liquids.The patient was deemed to be a good candidate for admission for acute inpatient rehab. The patient was admitted to acute inpatient rehab on 2/7/24.     Plan:  #Rehab for left cerebellar stroke with left hemiataxia.(nondominanat), and impaired cognition/ memory  likely due to small vessel dz vs A-A emboli  MRI brain w/out: Acute infarcts involving the left cerebellar hemisphere without hemorrhagic transformation  HbA1c 5.8 , TSH 2.10 and   TTE Mildly enlarged L atria. Normal EF  NIHSS+2= Ataxia in 2 limbs on admission to rehab  - S/p Loaded Aspirin 325mg and Plavix 300mg  - C/w Aspirin 81mg and Plavix 75mg daily for 90 days  - F/u EP for ILR placement  - HOB at 45 degrees, aspiration precautions  - PT/OT/ST/ neuropsych eval    #HTN  Home meds: Carvedilol 20 mg extended release PO daily, HCTZ 25 mg PO daily, Amlodipine 10 mg PO daily  - SBP goal: 110-150  - Start HCTZ 12.5 mg once daily  - c/w carvedilol 6.25 mg q12hrs  - c/w amlodipine 10 mg once daily   -c/w lisinopril 5 mg at bedtime   -Will monitor and adjust accordingly     #HLD  - c/w lipitor 80 mg daily    #SARS-Cov-2 positive 2/10/2024  -No fever, no WBC count  -Isolation precautions  -Monitor     Diarrhea (2/11)-resolved  -3 loose stool episodes in AM 2/11/2024  -bowel regimen held    #Bilat knee OA/ left knee chronic pain  - no NSAIDS for now  - Tylenol PRN for pain  - Modalities   - consider Voltaren cream and/ or lidoderm patch and Tylenol    #GI/Bowel Mgmt:/ Constipation  - finally had BM today with relief  - Miralax, senna (held 2/2 diarrhea)     -FEN : Monitor Na levels    - Diet: DASH/TLC    Precautions / PROPHYLAXIS:      - Falls      - DVT prophylaxis: Lovenox

## 2024-02-15 NOTE — PROGRESS NOTE ADULT - ATTENDING COMMENTS
Rehab of  left cerebellar stroke with left hemiataxia.(nondominanat), and impaired cognition/ memory  likely due to small vessel dz vs A-A emboli. Patient seen and examined with the resident. The treatment plan discussed. Agree with the above.

## 2024-02-16 LAB
ANION GAP SERPL CALC-SCNC: 8 MMOL/L — SIGNIFICANT CHANGE UP (ref 7–14)
BASOPHILS # BLD AUTO: 0.03 K/UL — SIGNIFICANT CHANGE UP (ref 0–0.2)
BASOPHILS NFR BLD AUTO: 0.5 % — SIGNIFICANT CHANGE UP (ref 0–1)
BUN SERPL-MCNC: 10 MG/DL — SIGNIFICANT CHANGE UP (ref 10–20)
CALCIUM SERPL-MCNC: 9.3 MG/DL — SIGNIFICANT CHANGE UP (ref 8.4–10.5)
CHLORIDE SERPL-SCNC: 107 MMOL/L — SIGNIFICANT CHANGE UP (ref 98–110)
CO2 SERPL-SCNC: 24 MMOL/L — SIGNIFICANT CHANGE UP (ref 17–32)
CREAT SERPL-MCNC: 0.6 MG/DL — LOW (ref 0.7–1.5)
EGFR: 90 ML/MIN/1.73M2 — SIGNIFICANT CHANGE UP
EOSINOPHIL # BLD AUTO: 0.61 K/UL — SIGNIFICANT CHANGE UP (ref 0–0.7)
EOSINOPHIL NFR BLD AUTO: 9.3 % — HIGH (ref 0–8)
GLUCOSE SERPL-MCNC: 96 MG/DL — SIGNIFICANT CHANGE UP (ref 70–99)
HCT VFR BLD CALC: 34.8 % — LOW (ref 37–47)
HGB BLD-MCNC: 12.1 G/DL — SIGNIFICANT CHANGE UP (ref 12–16)
IMM GRANULOCYTES NFR BLD AUTO: 0.5 % — HIGH (ref 0.1–0.3)
LYMPHOCYTES # BLD AUTO: 2.47 K/UL — SIGNIFICANT CHANGE UP (ref 1.2–3.4)
LYMPHOCYTES # BLD AUTO: 37.8 % — SIGNIFICANT CHANGE UP (ref 20.5–51.1)
MAGNESIUM SERPL-MCNC: 2.1 MG/DL — SIGNIFICANT CHANGE UP (ref 1.8–2.4)
MCHC RBC-ENTMCNC: 31.3 PG — HIGH (ref 27–31)
MCHC RBC-ENTMCNC: 34.8 G/DL — SIGNIFICANT CHANGE UP (ref 32–37)
MCV RBC AUTO: 89.9 FL — SIGNIFICANT CHANGE UP (ref 81–99)
MONOCYTES # BLD AUTO: 0.47 K/UL — SIGNIFICANT CHANGE UP (ref 0.1–0.6)
MONOCYTES NFR BLD AUTO: 7.2 % — SIGNIFICANT CHANGE UP (ref 1.7–9.3)
NEUTROPHILS # BLD AUTO: 2.92 K/UL — SIGNIFICANT CHANGE UP (ref 1.4–6.5)
NEUTROPHILS NFR BLD AUTO: 44.7 % — SIGNIFICANT CHANGE UP (ref 42.2–75.2)
NRBC # BLD: 0 /100 WBCS — SIGNIFICANT CHANGE UP (ref 0–0)
PLATELET # BLD AUTO: 226 K/UL — SIGNIFICANT CHANGE UP (ref 130–400)
PMV BLD: 10.3 FL — SIGNIFICANT CHANGE UP (ref 7.4–10.4)
POTASSIUM SERPL-MCNC: 4.2 MMOL/L — SIGNIFICANT CHANGE UP (ref 3.5–5)
POTASSIUM SERPL-SCNC: 4.2 MMOL/L — SIGNIFICANT CHANGE UP (ref 3.5–5)
RBC # BLD: 3.87 M/UL — LOW (ref 4.2–5.4)
RBC # FLD: 12.8 % — SIGNIFICANT CHANGE UP (ref 11.5–14.5)
SARS-COV-2 RNA SPEC QL NAA+PROBE: DETECTED
SODIUM SERPL-SCNC: 139 MMOL/L — SIGNIFICANT CHANGE UP (ref 135–146)
WBC # BLD: 6.53 K/UL — SIGNIFICANT CHANGE UP (ref 4.8–10.8)
WBC # FLD AUTO: 6.53 K/UL — SIGNIFICANT CHANGE UP (ref 4.8–10.8)

## 2024-02-16 RX ADMIN — ATORVASTATIN CALCIUM 80 MILLIGRAM(S): 80 TABLET, FILM COATED ORAL at 22:56

## 2024-02-16 RX ADMIN — MAGNESIUM OXIDE 400 MG ORAL TABLET 400 MILLIGRAM(S): 241.3 TABLET ORAL at 18:22

## 2024-02-16 RX ADMIN — FAMOTIDINE 20 MILLIGRAM(S): 10 INJECTION INTRAVENOUS at 05:08

## 2024-02-16 RX ADMIN — CLOPIDOGREL BISULFATE 75 MILLIGRAM(S): 75 TABLET, FILM COATED ORAL at 11:19

## 2024-02-16 RX ADMIN — CARVEDILOL PHOSPHATE 6.25 MILLIGRAM(S): 80 CAPSULE, EXTENDED RELEASE ORAL at 18:22

## 2024-02-16 RX ADMIN — Medication 81 MILLIGRAM(S): at 11:19

## 2024-02-16 RX ADMIN — ENOXAPARIN SODIUM 40 MILLIGRAM(S): 100 INJECTION SUBCUTANEOUS at 22:57

## 2024-02-16 RX ADMIN — AMLODIPINE BESYLATE 10 MILLIGRAM(S): 2.5 TABLET ORAL at 05:08

## 2024-02-16 RX ADMIN — LISINOPRIL 5 MILLIGRAM(S): 2.5 TABLET ORAL at 22:57

## 2024-02-16 RX ADMIN — MAGNESIUM OXIDE 400 MG ORAL TABLET 400 MILLIGRAM(S): 241.3 TABLET ORAL at 08:01

## 2024-02-16 RX ADMIN — CARVEDILOL PHOSPHATE 6.25 MILLIGRAM(S): 80 CAPSULE, EXTENDED RELEASE ORAL at 05:08

## 2024-02-16 RX ADMIN — FAMOTIDINE 20 MILLIGRAM(S): 10 INJECTION INTRAVENOUS at 18:22

## 2024-02-16 RX ADMIN — MAGNESIUM OXIDE 400 MG ORAL TABLET 400 MILLIGRAM(S): 241.3 TABLET ORAL at 11:20

## 2024-02-16 NOTE — PROGRESS NOTE ADULT - SUBJECTIVE AND OBJECTIVE BOX
Patient is an 83 y old female who presents with a chief complaint of Rehab of left cerebellar stroke with left hemiataxia.(nondominant) (07 Feb 2024 14:25)    HPI:  This is a 84 y/o RHD F with PMHx of HLD, HTN, L knee OA presenting for unsteady gait.    Patient states she was in her normal state of health a day prior to presentation. She was outside having lunch with her friend when she started to notice she was off balance that persists after coming back home. For worsening unsteady gait and imbalance she decided  to seek medical help the next day. She admits to vomiting, but denies any HA, diplopia, N, dizziness, numbness/tingling, speech difficulties. In the ED, vitals were significant for /72. CTH notable for L cerebellar subacute infarct. 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 NIHSS on admission was 3. Patient was admitted to stroke unit for further work up.     On 2/6/2204, the patient's Bp was elevated requiring 15mg of hydralazine overnight. CTH was repeated for possible worsening dysmetria, end gaze nystagmus, and NIHSS increase from 0 to 2 (for upper and lower left extremity dysmetria), urgent CTH showed no intracranial hemorrhage or midline shift. Left cerebellar hypodense lesion consistent with evolving subacute infarct.    The patient was evaluated by the PM&R team once medically stable. The patient was found to have functional limitation in terms of muscle strength, endurance, physical mobility, and ability to carry out activities of daily living (self care, transfers, and ambulation). The patient was started on a course of bedside therapy, the pt is motivated and able to start 3 hours of therapy  daily for 6-7 days a week. With PT/OT,  pt required Min A w transfers, Mod A w UB dressing, Mod A w LB dressing. Amb 15' using RW requiring Mod A. SLP evaluated the patient and recommended regular with thin liquids. The patient was deemed to be a good candidate for admission for acute inpatient rehab. The patient was admitted to acute inpatient rehab on 2/7/24.    (07 Feb 2024 14:25)      TODAY'S SUBJECTIVE & REVIEW OF SYMPTOMS:  Patient was seen and examined at bedside this morning. No acute overnight events. No new complaints offered this AM. Patient in good spirits and participating/tolerating therapies well. Voiding spontaneously and has regular BMs. Asymptomatic from COVID. COVID swab ordered to assess if she can be removed off of isolation. Vitals reviewed and stable. Blood pressures in last 24 hrs have improved with recent adjustments in blood pressure meds.    CLOF: PA for transfers, PA for UB dressing, Mod A for LB dressing. Ambulates 20ft with RW requiring PA, negotiated 1 steps w/2HR requiring Max A    Review of Systems:   Constitutional:    [x ] WNL           [   ] poor appetite   [   ] insomnia   [  ] tired   Cardio:                [ x ] WNL           [   ] CP   [  ] BARBOSA   [   ] palpitations               Resp:                   [ x  ] WNL           [   ] SOB   [  ] cough  [   ] wheezing   GI:                        [ x  ] WNL           [   ] constipation  [   ] diarrhea   [   ] abdominal pain   [   ] nausea   [   ] emesis                                :                      [   ] WNL           [   ] FRANCES  [   ] dysuria   [   ] difficulty voiding   []: Other:    Endo:                   [ x  ] WNL          [   ] polyuria   [   ] temperature intolerance                 Skin:                     [ x  ] WNL          [   ] pain   [   ] wound  [   ] rash   MSK:                    [  ] WNL          [  ] muscle pain   [ x ] joint pain/ stiffness-b/l knee pain L>R -stable  [   ] muscle tenderness   [   ] swelling   Neuro:                 [   ] WNL          [   ] HA   [   ] change in vision   [   ] tremor   [x  ] LSW weakness  [   ]dysphagia            Cognitive:           [ x ] WNL           [  ]confusion      Psych:                  [  x ] WNL           [   ] hallucinations   [   ]agitation   [   ] delusion   [   ]depression    PHYSICAL EXAM  Vital Signs Last 24 Hrs  T(C): 36.7 (16 Feb 2024 04:36), Max: 36.9 (15 Feb 2024 21:35)  T(F): 98 (16 Feb 2024 04:36), Max: 98.4 (15 Feb 2024 21:35)  HR: 68 (16 Feb 2024 04:36) (58 - 72)  BP: 137/65 (16 Feb 2024 04:36) (129/67 - 146/70)  BP(mean): 100 (15 Feb 2024 21:35) (100 - 100)  RR: 19 (16 Feb 2024 04:36) (19 - 20)  SpO2: 95% (16 Feb 2024 04:36) (95% - 95%)      General:[ x  ] NAD, Resting Comfortable   [   ] other:                                HEENT: [ x  ] NC/AT, EOMI,  Normal Conjunctivae,   [   ] other:   Cardio: [  x ] RRR, no murmur,   [   ] other:                              Pulm: [ x  ] No Respiratory Distress,  Lungs CTAB,   [   ] other:             Abdomen: [ x  ]ND/NT, Soft,   [   ] other:    : [ x  ] NO FRANCES CATHETER, [   ] FRANCES CATHETER present [ x ] other: PureWick  MSK: [ x ] No joint swelling, Full ROM   [   ] other:                                         Ext: [x ]No C/C/E, No calf tenderness,   [ ]other:    Skin: [ x  ]intact,   [   ] other: wound                                               Neurological Examination:  Cognitive: [  x  ] AAO x 3,   [  ]  other:     Attention:  [ x  ] intact   [    ]  other:           Concentration: [ x  ] intact   [    ]  other:   Language: [  ] intact  [ x ] Able to name 3 objects and describe function          Memory: [   ] intact,    [ x ]  other: Impaired, able to recall 1/3 objects   Mood/Affect: [   ] wnl  [  x  ]  other: emotional labile (crying)                                                                          Communication: [ x  ]Fluent, no dysarthria [ ] other:   CN II - XII:  [    ] intact,  [  x  ] other: horizontal  nystagmus noted  with fixation                                                                                        Motor:   RUE: 5/5: shoulder abduction, elbow flexion, elbow extension,  finger flexion  LUE: 4+/5: shoulder abduction, 4+/5 elbow flexion, elbow extension, 4+/5 finger flexion  RLE: 5/5 hip flexion, knee ext, knee flex, ankle dorsiflexion, and ankle  plantarflexion  LLE: 4+/5: hip flexion, knee ext, knee flex, ankle dorsiflexion, and ankle  plantarflexion    Tone: [  x ] wnl,   [    ]  other:  DTRs: [ x ]symmetric, [   ] other:  Coordination:   [    ] intact,   [ x ] other: +FTN, and heel to shin on the L, + L dysdiadokinesia / dysmetria.                                                                     Sensory: [ x  ] Intact to light touch,   [  ] other:    No clonus; No spasticity    MEDICATIONS  (STANDING):  amLODIPine   Tablet 10 milliGRAM(s) Oral daily  aspirin enteric coated 81 milliGRAM(s) Oral daily  atorvastatin 80 milliGRAM(s) Oral at bedtime  carvedilol 6.25 milliGRAM(s) Oral every 12 hours  clopidogrel Tablet 75 milliGRAM(s) Oral daily  dextrose 5% + sodium chloride 0.45%. 1000 milliLiter(s) (75 mL/Hr) IV Continuous <Continuous>  enoxaparin Injectable 40 milliGRAM(s) SubCutaneous every 24 hours  famotidine    Tablet 20 milliGRAM(s) Oral two times a day  hydrochlorothiazide 12.5 milliGRAM(s) Oral daily  lisinopril 5 milliGRAM(s) Oral at bedtime  magnesium oxide 400 milliGRAM(s) Oral three times a day with meals  VOLTAREN  GEL  1%  TOPICAL OINTMENT 1 Application(s) 1 Application(s) Topical three times a day    MEDICATIONS  (PRN):  acetaminophen     Tablet .. 650 milliGRAM(s) Oral every 6 hours PRN Temp greater or equal to 38C (100.4F), Mild Pain (1 - 3)  melatonin 3 milliGRAM(s) Oral at bedtime PRN Insomnia  traMADol 25 milliGRAM(s) Oral every 6 hours PRN severe pain shoulders      RECENT LABS/IMAGING                                  12.8   5.45  )-----------( 201      ( 12 Feb 2024 07:14 )             37.3     02-12    139  |  106  |  12  ----------------------------<  95  3.9   |  24  |  0.6<L>    Ca    9.2      12 Feb 2024 07:14  Mg     1.6     02-12    TPro  5.6<L>  /  Alb  3.3<L>  /  TBili  0.3  /  DBili  x   /  AST  27  /  ALT  36  /  AlkPhos  83  02-12      Urinalysis Basic - ( 12 Feb 2024 07:14 )    Color: x / Appearance: x / SG: x / pH: x  Gluc: 95 mg/dL / Ketone: x  / Bili: x / Urobili: x   Blood: x / Protein: x / Nitrite: x   Leuk Esterase: x / RBC: x / WBC x   Sq Epi: x / Non Sq Epi: x / Bacteria: x    POCT Blood Glucose.: 96 mg/dL (02-11-24 @ 21:04)

## 2024-02-16 NOTE — PROGRESS NOTE ADULT - ASSESSMENT
Assessment:  This is a 82 y/o right-handed F with PMHx of HLD, HTN, L knee OA presenting for worsening unsteady gait. In the ED, vitals were significant for /72. CTH notable for L cerebellar subacute infarct. 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 NIHSS on admission was 3 for ataxia L sided and LLE. Patient was admitted to stroke unit for further work up. On 2/6, the patient's Bp was elevated requiring 15mg of hydralazine overnight. CTH was repeated for possible worsening dysmetria, end gaze nystagmus, and NIHSS increase from 0 to 2 (for upper and lower left extremity dysmetria), urgent CTH showed no intracranial hemorrhage or midline shift. Left cerebellar hypodense lesion consistent with evolving subacute infarct.    The patient was evaluated by the PM&R team once medically stable. The patient was found to have functional limitation in terms of muscle strength, endurance, physical mobility, and ability to carry out activities of daily living (self care, transfers, and ambulation). The patient was started on a course of bedside therapy, the pt is motivated and able to start 3 hours of therapy  daily for 6-7 days a week. With PT/OT, pt required Min A w transfers, Mod A w UB dressing, Mod A w LB dressing. Amb 15' using RW requiring Mod A.  SLP evaluated the patient and recommended regular with thin liquids.The patient was deemed to be a good candidate for admission for acute inpatient rehab. The patient was admitted to acute inpatient rehab on 2/7/24.     Plan:  #Rehab for left cerebellar stroke with left hemiataxia.(nondominanat), and impaired cognition/ memory  likely due to small vessel dz vs A-A emboli  MRI brain w/out: Acute infarcts involving the left cerebellar hemisphere without hemorrhagic transformation  HbA1c 5.8 , TSH 2.10 and   TTE Mildly enlarged L atria. Normal EF  NIHSS+2= Ataxia in 2 limbs on admission to rehab  - S/p Loaded Aspirin 325mg and Plavix 300mg  - C/w Aspirin 81mg and Plavix 75mg daily for 90 days  - F/u EP for ILR placement  - HOB at 45 degrees, aspiration precautions  - PT/OT/ST/ neuropsych eval    #HTN  Home meds: Carvedilol 20 mg extended release PO daily, HCTZ 25 mg PO daily, Amlodipine 10 mg PO daily  - SBP goal: 110-150  - Start HCTZ 12.5 mg once daily  - c/w carvedilol 6.25 mg q12hrs  - c/w amlodipine 10 mg once daily   -c/w lisinopril 5 mg at bedtime   -Will monitor and adjust accordingly     #HLD  - c/w lipitor 80 mg daily    #SARS-Cov-2 positive 2/10/2024  -No fever, no WBC count  -Isolation precautions  -Monitor   -re-test ordered 2/16 and 2/17 to assess if she can be removed from isolation    #Diarrhea (2/11)-resolved  -3 loose stool episodes in AM 2/11/2024  -bowel regimen held    #Bilat knee OA/ left knee chronic pain  - no NSAIDS for now  - Tylenol PRN for pain  - Modalities   - consider Voltaren cream and/ or lidoderm patch and Tylenol    #GI/Bowel Mgmt:/ Constipation  - finally had BM today with relief  - Miralax, senna (held 2/2 diarrhea)     -FEN : Monitor Na levels    - Diet: DASH/TLC    Precautions / PROPHYLAXIS:      - Falls      - DVT prophylaxis: Lovenox

## 2024-02-16 NOTE — PROGRESS NOTE ADULT - ATTENDING COMMENTS
Patient seen and examined with the resident. We discussed the case. I have directed the care. I edited the note. The patient requires acute rehab with 3 hours of daily therapies at least 5 out of 7 days and close physiatry follow up. COVID testing today and tomorrow; if she is negative on both days she can come off quarantine on Sunday.  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 Normal EF  NIHSS+2= Ataxia in 2 limbs on admission to rehab  - S/p Loaded Aspirin 325mg and Plavix 300mg  - C/w Aspirin 81mg and Plavix 75mg daily for 90 days  - F/u EP for ILR placement  - HOB at 45 degrees, aspiration precautions  - PT/OT/ST/ neuropsych eval    #HTN  Home meds: Carvedilol 20 mg extended release PO daily, HCTZ 25 mg PO daily, Amlodipine 10 mg PO daily  - SBP goal: 110-150  - Start HCTZ 12.5 mg once daily  - c/w carvedilol 6.25 mg q12hrs  - c/w amlodipine 10 mg once daily   -c/w lisinopril 5 mg at bedtime   -Will monitor and adjust accordingly     #HLD  - c/w lipitor 80 mg daily    #SARS-Cov-2 positive 2/10/2024  -No fever, no WBC count  -Isolation precautions  -Monitor   -re-test ordered 2/16 and 2/17 to assess if she can be removed from isolation    #Diarrhea (2/11)-resolved  -3 loose stool episodes in AM 2/11/2024  -bowel regimen held    #Bilat knee OA/ left knee chronic pain  - no NSAIDS for now  - Tylenol PRN for pain  - Modalities   - consider Voltaren cream and/ or lidoderm patch and Tylenol    #GI/Bowel Mgmt:/ Constipation  - finally had BM today with relief  - Miralax, senna (held 2/2 diarrhea

## 2024-02-17 RX ADMIN — ATORVASTATIN CALCIUM 80 MILLIGRAM(S): 80 TABLET, FILM COATED ORAL at 21:42

## 2024-02-17 RX ADMIN — CARVEDILOL PHOSPHATE 6.25 MILLIGRAM(S): 80 CAPSULE, EXTENDED RELEASE ORAL at 17:05

## 2024-02-17 RX ADMIN — FAMOTIDINE 20 MILLIGRAM(S): 10 INJECTION INTRAVENOUS at 17:05

## 2024-02-17 RX ADMIN — MAGNESIUM OXIDE 400 MG ORAL TABLET 400 MILLIGRAM(S): 241.3 TABLET ORAL at 08:22

## 2024-02-17 RX ADMIN — MAGNESIUM OXIDE 400 MG ORAL TABLET 400 MILLIGRAM(S): 241.3 TABLET ORAL at 17:05

## 2024-02-17 RX ADMIN — LISINOPRIL 5 MILLIGRAM(S): 2.5 TABLET ORAL at 21:42

## 2024-02-17 RX ADMIN — MAGNESIUM OXIDE 400 MG ORAL TABLET 400 MILLIGRAM(S): 241.3 TABLET ORAL at 13:11

## 2024-02-17 RX ADMIN — FAMOTIDINE 20 MILLIGRAM(S): 10 INJECTION INTRAVENOUS at 06:40

## 2024-02-17 RX ADMIN — ENOXAPARIN SODIUM 40 MILLIGRAM(S): 100 INJECTION SUBCUTANEOUS at 21:42

## 2024-02-17 RX ADMIN — CLOPIDOGREL BISULFATE 75 MILLIGRAM(S): 75 TABLET, FILM COATED ORAL at 13:11

## 2024-02-17 RX ADMIN — Medication 81 MILLIGRAM(S): at 13:10

## 2024-02-17 NOTE — PROGRESS NOTE ADULT - SUBJECTIVE AND OBJECTIVE BOX
Patient is an 83 y old female who presents with a chief complaint of Rehab of left cerebellar stroke with left hemiataxia.(nondominant) (07 Feb 2024 14:25)    HPI:  This is a 84 y/o RHD F with PMHx of HLD, HTN, L knee OA presenting for unsteady gait.    Patient states she was in her normal state of health a day prior to presentation. She was outside having lunch with her friend when she started to notice she was off balance that persists after coming back home. For worsening unsteady gait and imbalance she decided  to seek medical help the next day. She admits to vomiting, but denies any HA, diplopia, N, dizziness, numbness/tingling, speech difficulties. In the ED, vitals were significant for /72. CTH notable for L cerebellar subacute infarct. 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 NIHSS on admission was 3. Patient was admitted to stroke unit for further work up.     On 2/6/2204, the patient's Bp was elevated requiring 15mg of hydralazine overnight. CTH was repeated for possible worsening dysmetria, end gaze nystagmus, and NIHSS increase from 0 to 2 (for upper and lower left extremity dysmetria), urgent CTH showed no intracranial hemorrhage or midline shift. Left cerebellar hypodense lesion consistent with evolving subacute infarct.    The patient was evaluated by the PM&R team once medically stable. The patient was found to have functional limitation in terms of muscle strength, endurance, physical mobility, and ability to carry out activities of daily living (self care, transfers, and ambulation). The patient was started on a course of bedside therapy, the pt is motivated and able to start 3 hours of therapy  daily for 6-7 days a week. With PT/OT,  pt required Min A w transfers, Mod A w UB dressing, Mod A w LB dressing. Amb 15' using RW requiring Mod A. SLP evaluated the patient and recommended regular with thin liquids. The patient was deemed to be a good candidate for admission for acute inpatient rehab. The patient was admitted to acute inpatient rehab on 2/7/24.    (07 Feb 2024 14:25)      TODAY'S SUBJECTIVE & REVIEW OF SYMPTOMS:  Patient was seen and examined at bedside this morning. No acute overnight events. No new complaints offered this AM. Patient in good spirits and participating/tolerating therapies well. Voiding spontaneously and has regular BMs. Asymptomatic from COVID.     Vitals reviewed and stable.   COVID swab was done on 2/16, came back positive. Patient remains on isolation.      CLOF: PA for transfers, PA for UB dressing, Mod A for LB dressing. Ambulates 20ft with RW requiring PA, negotiated 1 steps w/2HR requiring Max A    Review of Systems:   Constitutional:    [x ] WNL           [   ] poor appetite   [   ] insomnia   [  ] tired   Cardio:                [ x ] WNL           [   ] CP   [  ] BARBOSA   [   ] palpitations               Resp:                   [ x  ] WNL           [   ] SOB   [  ] cough  [   ] wheezing   GI:                        [ x  ] WNL           [   ] constipation  [   ] diarrhea   [   ] abdominal pain   [   ] nausea   [   ] emesis                                :                      [   ] WNL           [   ] FRANCES  [   ] dysuria   [   ] difficulty voiding   []: Other:    Endo:                   [ x  ] WNL          [   ] polyuria   [   ] temperature intolerance                 Skin:                     [ x  ] WNL          [   ] pain   [   ] wound  [   ] rash   MSK:                    [  ] WNL          [  ] muscle pain   [ x ] joint pain/ stiffness-b/l knee pain L>R -stable  [   ] muscle tenderness   [   ] swelling   Neuro:                 [   ] WNL          [   ] HA   [   ] change in vision   [   ] tremor   [x  ] LSW weakness  [   ]dysphagia            Cognitive:           [ x ] WNL           [  ]confusion      Psych:                  [  x ] WNL           [   ] hallucinations   [   ]agitation   [   ] delusion   [   ]depression    PHYSICAL EXAM  Vital Signs Last 24 Hrs  T(C): 36.9 (17 Feb 2024 05:48), Max: 36.9 (17 Feb 2024 05:48)  T(F): 98.5 (17 Feb 2024 05:48), Max: 98.5 (17 Feb 2024 05:48)  HR: 60 (17 Feb 2024 05:48) (60 - 72)  BP: 114/60 (17 Feb 2024 05:48) (114/60 - 143/65)  RR: 18 (17 Feb 2024 05:48) (18 - 18)    General:[ x  ] NAD, Resting Comfortable   [   ] other:                                HEENT: [ x  ] NC/AT, EOMI,  Normal Conjunctivae,   [   ] other:   Cardio: [  x ] RRR, no murmur,   [   ] other:                              Pulm: [ x  ] No Respiratory Distress,  Lungs CTAB,   [   ] other:             Abdomen: [ x  ]ND/NT, Soft,   [   ] other:    : [ x  ] NO FRANCES CATHETER, [   ] FRANCES CATHETER present [ x ] other: PureWick  MSK: [ x ] No joint swelling, Full ROM   [   ] other:                                         Ext: [x ]No C/C/E, No calf tenderness,   [ ]other:    Skin: [ x  ]intact,   [   ] other: wound                                               Neurological Examination:  Cognitive: [  x  ] AAO x 3,   [  ]  other:     Attention:  [ x  ] intact   [    ]  other:           Concentration: [ x  ] intact   [    ]  other:   Language: [  ] intact  [ x ] Able to name 3 objects and describe function          Memory: [   ] intact,    [ x ]  other: Impaired, able to recall 1/3 objects   Mood/Affect: [   ] wnl  [  x  ]  other: emotional labile (crying)                                                                          Communication: [ x  ]Fluent, no dysarthria [ ] other:   CN II - XII:  [    ] intact,  [  x  ] other: horizontal  nystagmus noted  with fixation                                                                                        Motor:   RUE: 5/5: shoulder abduction, elbow flexion, elbow extension,  finger flexion  LUE: 4+/5: shoulder abduction, 4+/5 elbow flexion, elbow extension, 4+/5 finger flexion  RLE: 5/5 hip flexion, knee ext, knee flex, ankle dorsiflexion, and ankle  plantarflexion  LLE: 4+/5: hip flexion, knee ext, knee flex, ankle dorsiflexion, and ankle  plantarflexion    Tone: [  x ] wnl,   [    ]  other:  DTRs: [ x ]symmetric, [   ] other:  Coordination:   [    ] intact,   [ x ] other: +FTN, and heel to shin on the L, + L dysdiadokinesia / dysmetria.                                                                     Sensory: [ x  ] Intact to light touch,   [  ] other:    No clonus; No spasticity    MEDICATIONS  (STANDING):  amLODIPine   Tablet 10 milliGRAM(s) Oral daily  aspirin enteric coated 81 milliGRAM(s) Oral daily  atorvastatin 80 milliGRAM(s) Oral at bedtime  carvedilol 6.25 milliGRAM(s) Oral every 12 hours  clopidogrel Tablet 75 milliGRAM(s) Oral daily  dextrose 5% + sodium chloride 0.45%. 1000 milliLiter(s) (75 mL/Hr) IV Continuous <Continuous>  enoxaparin Injectable 40 milliGRAM(s) SubCutaneous every 24 hours  famotidine    Tablet 20 milliGRAM(s) Oral two times a day  hydrochlorothiazide 12.5 milliGRAM(s) Oral daily  lisinopril 5 milliGRAM(s) Oral at bedtime  magnesium oxide 400 milliGRAM(s) Oral three times a day with meals  VOLTAREN  GEL  1%  TOPICAL OINTMENT 1 Application(s) 1 Application(s) Topical three times a day    MEDICATIONS  (PRN):  acetaminophen     Tablet .. 650 milliGRAM(s) Oral every 6 hours PRN Temp greater or equal to 38C (100.4F), Mild Pain (1 - 3)  melatonin 3 milliGRAM(s) Oral at bedtime PRN Insomnia  traMADol 25 milliGRAM(s) Oral every 6 hours PRN severe pain shoulders      RECENT LABS/IMAGING                                     12.1   6.53  )-----------( 226      ( 16 Feb 2024 06:29 )             34.8     02-16    139  |  107  |  10  ----------------------------<  96  4.2   |  24  |  0.6<L>    Ca    9.3      16 Feb 2024 06:29  Mg     2.1     02-16        Urinalysis Basic - ( 16 Feb 2024 06:29 )    Color: x / Appearance: x / SG: x / pH: x  Gluc: 96 mg/dL / Ketone: x  / Bili: x / Urobili: x   Blood: x / Protein: x / Nitrite: x   Leuk Esterase: x / RBC: x / WBC x   Sq Epi: x / Non Sq Epi: x / Bacteria: x

## 2024-02-17 NOTE — PROGRESS NOTE ADULT - ASSESSMENT
Assessment:  This is a 84 y/o right-handed F with PMHx of HLD, HTN, L knee OA presenting for worsening unsteady gait. In the ED, vitals were significant for /72. CTH notable for L cerebellar subacute infarct. 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 NIHSS on admission was 3 for ataxia L sided and LLE. Patient was admitted to stroke unit for further work up. On 2/6, the patient's Bp was elevated requiring 15mg of hydralazine overnight. CTH was repeated for possible worsening dysmetria, end gaze nystagmus, and NIHSS increase from 0 to 2 (for upper and lower left extremity dysmetria), urgent CTH showed no intracranial hemorrhage or midline shift. Left cerebellar hypodense lesion consistent with evolving subacute infarct.    The patient was evaluated by the PM&R team once medically stable. The patient was found to have functional limitation in terms of muscle strength, endurance, physical mobility, and ability to carry out activities of daily living (self care, transfers, and ambulation). The patient was started on a course of bedside therapy, the pt is motivated and able to start 3 hours of therapy  daily for 6-7 days a week. With PT/OT, pt required Min A w transfers, Mod A w UB dressing, Mod A w LB dressing. Amb 15' using RW requiring Mod A.  SLP evaluated the patient and recommended regular with thin liquids.The patient was deemed to be a good candidate for admission for acute inpatient rehab. The patient was admitted to acute inpatient rehab on 2/7/24.     Plan:  #Rehab for left cerebellar stroke with left hemiataxia.(nondominanat), and impaired cognition/ memory  likely due to small vessel dz vs A-A emboli  MRI brain w/out: Acute infarcts involving the left cerebellar hemisphere without hemorrhagic transformation  HbA1c 5.8 , TSH 2.10 and   TTE Mildly enlarged L atria. Normal EF  NIHSS+2= Ataxia in 2 limbs on admission to rehab  - S/p Loaded Aspirin 325mg and Plavix 300mg  - C/w Aspirin 81mg and Plavix 75mg daily for 90 days  - F/u EP for ILR placement  - HOB at 45 degrees, aspiration precautions  - PT/OT/ST/ neuropsych eval    #HTN  Home meds: Carvedilol 20 mg extended release PO daily, HCTZ 25 mg PO daily, Amlodipine 10 mg PO daily  - SBP goal: 110-150  - c/w HCTZ 12.5 mg once daily  - c/w carvedilol 6.25 mg q12hrs  - c/w amlodipine 10 mg once daily   -c/w lisinopril 5 mg at bedtime   -Will monitor and adjust accordingly     #HLD  - c/w lipitor 80 mg daily    #SARS-Cov-2 positive 2/10/2024  -No fever, no WBC count  -Isolation precautions  -Monitor   -re-test on 2/16 was positive, remains on isolation    #Diarrhea (2/11)-resolved  -3 loose stool episodes in AM 2/11/2024  -bowel regimen held    #Bilat knee OA/ left knee chronic pain  - no NSAIDS for now  - Tylenol PRN for pain  - Modalities   - consider Voltaren cream and/ or lidoderm patch and Tylenol    #GI/Bowel Mgmt:/ Constipation  - finally had BM today with relief  - Miralax, senna (held 2/2 diarrhea)     -FEN : Monitor Na levels    - Diet: DASH/TLC    Precautions / PROPHYLAXIS:      - Falls      - DVT prophylaxis: Lovenox

## 2024-02-18 LAB — SARS-COV-2 RNA SPEC QL NAA+PROBE: DETECTED

## 2024-02-18 RX ADMIN — ATORVASTATIN CALCIUM 80 MILLIGRAM(S): 80 TABLET, FILM COATED ORAL at 21:59

## 2024-02-18 RX ADMIN — FAMOTIDINE 20 MILLIGRAM(S): 10 INJECTION INTRAVENOUS at 18:15

## 2024-02-18 RX ADMIN — CLOPIDOGREL BISULFATE 75 MILLIGRAM(S): 75 TABLET, FILM COATED ORAL at 14:01

## 2024-02-18 RX ADMIN — ENOXAPARIN SODIUM 40 MILLIGRAM(S): 100 INJECTION SUBCUTANEOUS at 21:59

## 2024-02-18 RX ADMIN — LISINOPRIL 5 MILLIGRAM(S): 2.5 TABLET ORAL at 21:59

## 2024-02-18 RX ADMIN — CARVEDILOL PHOSPHATE 6.25 MILLIGRAM(S): 80 CAPSULE, EXTENDED RELEASE ORAL at 18:15

## 2024-02-18 RX ADMIN — CARVEDILOL PHOSPHATE 6.25 MILLIGRAM(S): 80 CAPSULE, EXTENDED RELEASE ORAL at 05:57

## 2024-02-18 RX ADMIN — Medication 81 MILLIGRAM(S): at 14:01

## 2024-02-18 RX ADMIN — FAMOTIDINE 20 MILLIGRAM(S): 10 INJECTION INTRAVENOUS at 05:57

## 2024-02-18 RX ADMIN — AMLODIPINE BESYLATE 10 MILLIGRAM(S): 2.5 TABLET ORAL at 05:57

## 2024-02-18 NOTE — PROGRESS NOTE ADULT - ASSESSMENT
Assessment:  This is a 84 y/o right-handed F with PMHx of HLD, HTN, L knee OA presenting for worsening unsteady gait. In the ED, vitals were significant for /72. CTH notable for L cerebellar subacute infarct. 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 NIHSS on admission was 3 for ataxia L sided and LLE. Patient was admitted to stroke unit for further work up. On 2/6, the patient's Bp was elevated requiring 15mg of hydralazine overnight. CTH was repeated for possible worsening dysmetria, end gaze nystagmus, and NIHSS increase from 0 to 2 (for upper and lower left extremity dysmetria), urgent CTH showed no intracranial hemorrhage or midline shift. Left cerebellar hypodense lesion consistent with evolving subacute infarct.    The patient was evaluated by the PM&R team once medically stable. The patient was found to have functional limitation in terms of muscle strength, endurance, physical mobility, and ability to carry out activities of daily living (self care, transfers, and ambulation). The patient was started on a course of bedside therapy, the pt is motivated and able to start 3 hours of therapy  daily for 6-7 days a week. With PT/OT, pt required Min A w transfers, Mod A w UB dressing, Mod A w LB dressing. Amb 15' using RW requiring Mod A.  SLP evaluated the patient and recommended regular with thin liquids.The patient was deemed to be a good candidate for admission for acute inpatient rehab. The patient was admitted to acute inpatient rehab on 2/7/24.     Plan:  #Rehab for left cerebellar stroke with left hemiataxia.(nondominanat), and impaired cognition/ memory  likely due to small vessel dz vs A-A emboli  MRI brain w/out: Acute infarcts involving the left cerebellar hemisphere without hemorrhagic transformation  HbA1c 5.8 , TSH 2.10 and   TTE Mildly enlarged L atria. Normal EF  NIHSS+2= Ataxia in 2 limbs on admission to rehab  - S/p Loaded Aspirin 325mg and Plavix 300mg  - C/w Aspirin 81mg and Plavix 75mg daily for 90 days  - F/u EP for ILR placement  - HOB at 45 degrees, aspiration precautions  - PT/OT/ST/ neuropsych eval    #HTN  Home meds: Carvedilol 20 mg extended release PO daily, HCTZ 25 mg PO daily, Amlodipine 10 mg PO daily  - SBP goal: 110-150  - c/w HCTZ 12.5 mg once daily  - c/w carvedilol 6.25 mg q12hrs  - c/w amlodipine 10 mg once daily   -c/w lisinopril 5 mg at bedtime   -Will monitor and adjust accordingly     #HLD  - c/w lipitor 80 mg daily    #SARS-Cov-2 positive 2/9/2024  -No fever, no WBC count  -Isolation precautions  -Monitor   -re-test on 2/16 was positive, remains on isolation    #Diarrhea (2/11)-resolved  -3 loose stool episodes in AM 2/11/2024  -bowel regimen held    #Bilat knee OA/ left knee chronic pain  - no NSAIDS for now  - Tylenol PRN for pain  - Modalities   - consider Voltaren cream and/ or lidoderm patch and Tylenol    #GI/Bowel Mgmt:/ Constipation  - finally had BM today with relief  - Miralax, senna (held 2/2 diarrhea)     -FEN : Monitor Na levels    - Diet: DASH/TLC    Precautions / PROPHYLAXIS:      - Falls      - DVT prophylaxis: Lovenox        Assessment:  This is a 82 y/o right-handed F with PMHx of HLD, HTN, L knee OA presenting for worsening unsteady gait. In the ED, vitals were significant for /72. CTH notable for L cerebellar subacute infarct. 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 NIHSS on admission was 3 for ataxia L sided and LLE. Patient was admitted to stroke unit for further work up. On 2/6, the patient's Bp was elevated requiring 15mg of hydralazine overnight. CTH was repeated for possible worsening dysmetria, end gaze nystagmus, and NIHSS increase from 0 to 2 (for upper and lower left extremity dysmetria), urgent CTH showed no intracranial hemorrhage or midline shift. Left cerebellar hypodense lesion consistent with evolving subacute infarct.    The patient was evaluated by the PM&R team once medically stable. The patient was found to have functional limitation in terms of muscle strength, endurance, physical mobility, and ability to carry out activities of daily living (self care, transfers, and ambulation). The patient was started on a course of bedside therapy, the pt is motivated and able to start 3 hours of therapy  daily for 6-7 days a week. With PT/OT, pt required Min A w transfers, Mod A w UB dressing, Mod A w LB dressing. Amb 15' using RW requiring Mod A.  SLP evaluated the patient and recommended regular with thin liquids.The patient was deemed to be a good candidate for admission for acute inpatient rehab. The patient was admitted to acute inpatient rehab on 2/7/24.     Plan:  #Rehab for left cerebellar stroke with left hemiataxia.(nondominanat), and impaired cognition/ memory  likely due to small vessel dz vs A-A emboli  MRI brain w/out: Acute infarcts involving the left cerebellar hemisphere without hemorrhagic transformation  HbA1c 5.8 , TSH 2.10 and   TTE Mildly enlarged L atria. Normal EF  NIHSS+2= Ataxia in 2 limbs on admission to rehab  - S/p Loaded Aspirin 325mg and Plavix 300mg  - C/w Aspirin 81mg and Plavix 75mg daily for 90 days  - F/u EP for ILR placement  - HOB at 45 degrees, aspiration precautions  - PT/OT/ST/ neuropsych eval    #HTN  Home meds: Carvedilol 20 mg extended release PO daily, HCTZ 25 mg PO daily, Amlodipine 10 mg PO daily  - SBP goal: 110-150  - c/w HCTZ 12.5 mg once daily  - c/w carvedilol 6.25 mg q12hrs  - c/w amlodipine 10 mg once daily   -c/w lisinopril 5 mg at bedtime   -Will monitor and adjust accordingly     #HLD  - c/w lipitor 80 mg daily    #SARS-Cov-2 positive 2/9/2024  -No fever, no WBC count  -Isolation precautions  -Monitor   -re-test on 2/16 was positive, remains on isolation    #Diarrhea (2/11)-resolved  -3 loose stool episodes in AM 2/11/2024  -bowel regimen held  - no recent complaint    #Bilat knee OA/ left knee chronic pain  - no NSAIDS for now  - Tylenol PRN for pain  - Modalities   - consider Voltaren cream and/ or lidoderm patch and Tylenol    #GI/Bowel Mgmt:/ Constipation  - finally had BM today with relief  - Miralax, senna (held 2/2 diarrhea)     -FEN : Monitor Na levels    - Diet: DASH/TLC    Precautions / PROPHYLAXIS:      - Falls      - DVT prophylaxis: Lovenox

## 2024-02-18 NOTE — PROGRESS NOTE ADULT - ATTENDING COMMENTS
I reviewed the chart and examined the patient with the resident and we discussed the findings and treatment plan.  The patient is tolerating the rehab program well. I agree with the findings and treatment plan above, which I modified as indicated. The patient requires 3 hrs a day of acute inpatient rehab. No new complaint. Alert. VSS. No viral symptoms. Recent COVID pcr positive - remains on isolation. Neuro exam stable. Ambulates with min assist. Continue acute rehab program.    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 Normal EF  NIHSS+2= Ataxia in 2 limbs on admission to rehab  - S/p Loaded Aspirin 325mg and Plavix 300mg  - C/w Aspirin 81mg and Plavix 75mg daily for 90 days  - F/u EP for ILR placement  - HOB at 45 degrees, aspiration precautions  - PT/OT/ST/ neuropsych eval    #HTN  Home meds: Carvedilol 20 mg extended release PO daily, HCTZ 25 mg PO daily, Amlodipine 10 mg PO daily  - SBP goal: 110-150  - c/w HCTZ 12.5 mg once daily  - c/w carvedilol 6.25 mg q12hrs  - c/w amlodipine 10 mg once daily   -c/w lisinopril 5 mg at bedtime   -Will monitor and adjust accordingly     #HLD  - c/w lipitor 80 mg daily    #SARS-Cov-2 positive 2/9/2024  -No fever, no WBC count  -Isolation precautions  -Monitor   -re-test on 2/16 was positive, remains on isolation    #Diarrhea (2/11)-resolved  -3 loose stool episodes in AM 2/11/2024  -bowel regimen held  - no recent complaint    #Bilat knee OA/ left knee chronic pain  - no NSAIDS for now  - Tylenol PRN for pain  - Modalities   - consider Voltaren cream and/ or lidoderm patch and Tylenol    #GI/Bowel Mgmt:/ Constipation  - finally had BM today with relief  - Miralax, senna (held 2/2 diarrhea)

## 2024-02-18 NOTE — PROGRESS NOTE ADULT - SUBJECTIVE AND OBJECTIVE BOX
Patient is an 83 y old female who presents with a chief complaint of Rehab of left cerebellar stroke with left hemiataxia.(nondominant) (07 Feb 2024 14:25)    HPI:  This is a 82 y/o RHD F with PMHx of HLD, HTN, L knee OA presenting for unsteady gait.    Patient states she was in her normal state of health a day prior to presentation. She was outside having lunch with her friend when she started to notice she was off balance that persists after coming back home. For worsening unsteady gait and imbalance she decided  to seek medical help the next day. She admits to vomiting, but denies any HA, diplopia, N, dizziness, numbness/tingling, speech difficulties. In the ED, vitals were significant for /72. CTH notable for L cerebellar subacute infarct. 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 NIHSS on admission was 3. Patient was admitted to stroke unit for further work up.     On 2/6/2204, the patient's Bp was elevated requiring 15mg of hydralazine overnight. CTH was repeated for possible worsening dysmetria, end gaze nystagmus, and NIHSS increase from 0 to 2 (for upper and lower left extremity dysmetria), urgent CTH showed no intracranial hemorrhage or midline shift. Left cerebellar hypodense lesion consistent with evolving subacute infarct.    The patient was evaluated by the PM&R team once medically stable. The patient was found to have functional limitation in terms of muscle strength, endurance, physical mobility, and ability to carry out activities of daily living (self care, transfers, and ambulation). The patient was started on a course of bedside therapy, the pt is motivated and able to start 3 hours of therapy  daily for 6-7 days a week. With PT/OT,  pt required Min A w transfers, Mod A w UB dressing, Mod A w LB dressing. Amb 15' using RW requiring Mod A. SLP evaluated the patient and recommended regular with thin liquids. The patient was deemed to be a good candidate for admission for acute inpatient rehab. The patient was admitted to acute inpatient rehab on 2/7/24.    (07 Feb 2024 14:25)      TODAY'S SUBJECTIVE & REVIEW OF SYMPTOMS:  Patient was seen and examined at bedside this morning. No acute overnight events. No new complaints offered this AM. Patient in good spirits and participating/tolerating therapies well. Voiding spontaneously and has regular BMs. Asymptomatic from COVID. Vitals reviewed and stable.       CLOF: PA for transfers, PA for UB dressing, Mod A for LB dressing. Ambulates 20ft with RW requiring PA, negotiated 1 steps w/2HR requiring Max A    Review of Systems:   Constitutional:    [x ] WNL           [   ] poor appetite   [   ] insomnia   [  ] tired   Cardio:                [ x ] WNL           [   ] CP   [  ] BARBOSA   [   ] palpitations               Resp:                   [ x  ] WNL           [   ] SOB   [  ] cough  [   ] wheezing   GI:                        [ x  ] WNL           [   ] constipation  [   ] diarrhea   [   ] abdominal pain   [   ] nausea   [   ] emesis                                :                      [   ] WNL           [   ] FRANCES  [   ] dysuria   [   ] difficulty voiding   []: Other:    Endo:                   [ x  ] WNL          [   ] polyuria   [   ] temperature intolerance                 Skin:                     [ x  ] WNL          [   ] pain   [   ] wound  [   ] rash   MSK:                    [  ] WNL          [  ] muscle pain   [ x ] joint pain/ stiffness-b/l knee pain L>R -stable  [   ] muscle tenderness   [   ] swelling   Neuro:                 [   ] WNL          [   ] HA   [   ] change in vision   [   ] tremor   [x  ] LSW weakness  [   ]dysphagia            Cognitive:           [ x ] WNL           [  ]confusion      Psych:                  [  x ] WNL           [   ] hallucinations   [   ]agitation   [   ] delusion   [   ]depression    PHYSICAL EXAM  Vital Signs Last 24 Hrs  T(C): 36.1 (18 Feb 2024 05:08), Max: 36.9 (17 Feb 2024 20:28)  T(F): 97 (18 Feb 2024 05:08), Max: 98.5 (17 Feb 2024 20:28)  HR: 68 (18 Feb 2024 05:08) (60 - 69)  BP: 164/72 (18 Feb 2024 05:08) (129/69 - 164/72)  BP(mean): --  RR: 18 (18 Feb 2024 05:08) (18 - 18)  SpO2: --    General:[ x  ] NAD, Resting Comfortable   [   ] other:                                HEENT: [ x  ] NC/AT, EOMI,  Normal Conjunctivae,   [   ] other:   Cardio: [  x ] RRR, no murmur,   [   ] other:                              Pulm: [ x  ] No Respiratory Distress,  Lungs CTAB,   [   ] other:             Abdomen: [ x  ]ND/NT, Soft,   [   ] other:    : [ x  ] NO FRANCES CATHETER, [   ] FRANCES CATHETER present [ x ] other: PureWick  MSK: [ x ] No joint swelling, Full ROM   [   ] other:                                         Ext: [x ]No C/C/E, No calf tenderness,   [ ]other:    Skin: [ x  ]intact,   [   ] other: wound                                               Neurological Examination:  Cognitive: [  x  ] AAO x 3,   [  ]  other:     Attention:  [ x  ] intact   [    ]  other:           Concentration: [ x  ] intact   [    ]  other:   Language: [  ] intact  [ x ] Able to name 3 objects and describe function          Memory: [   ] intact,    [ x ]  other: Impaired, able to recall 1/3 objects   Mood/Affect: [   ] wnl  [  x  ]  other: emotional labile (crying)                                                                          Communication: [ x  ]Fluent, no dysarthria [ ] other:   CN II - XII:  [    ] intact,  [  x  ] other: horizontal  nystagmus noted  with fixation                                                                                        Motor:   RUE: 5/5: shoulder abduction, elbow flexion, elbow extension,  finger flexion  LUE: 4+/5: shoulder abduction, 4+/5 elbow flexion, elbow extension, 4+/5 finger flexion  RLE: 5/5 hip flexion, knee ext, knee flex, ankle dorsiflexion, and ankle  plantarflexion  LLE: 4+/5: hip flexion, knee ext, knee flex, ankle dorsiflexion, and ankle  plantarflexion    Tone: [  x ] wnl,   [    ]  other:  DTRs: [ x ]symmetric, [   ] other:  Coordination:   [    ] intact,   [ x ] other: +FTN, and heel to shin on the L, + L dysdiadokinesia / dysmetria.                                                                     Sensory: [ x  ] Intact to light touch,   [  ] other:    No clonus; No spasticity    MEDICATIONS  (STANDING):  amLODIPine   Tablet 10 milliGRAM(s) Oral daily  aspirin enteric coated 81 milliGRAM(s) Oral daily  atorvastatin 80 milliGRAM(s) Oral at bedtime  carvedilol 6.25 milliGRAM(s) Oral every 12 hours  clopidogrel Tablet 75 milliGRAM(s) Oral daily  dextrose 5% + sodium chloride 0.45%. 1000 milliLiter(s) (75 mL/Hr) IV Continuous <Continuous>  enoxaparin Injectable 40 milliGRAM(s) SubCutaneous every 24 hours  famotidine    Tablet 20 milliGRAM(s) Oral two times a day  hydrochlorothiazide 12.5 milliGRAM(s) Oral daily  lisinopril 5 milliGRAM(s) Oral at bedtime  magnesium oxide 400 milliGRAM(s) Oral three times a day with meals  VOLTAREN  GEL  1%  TOPICAL OINTMENT 1 Application(s) 1 Application(s) Topical three times a day    MEDICATIONS  (PRN):  acetaminophen     Tablet .. 650 milliGRAM(s) Oral every 6 hours PRN Temp greater or equal to 38C (100.4F), Mild Pain (1 - 3)  melatonin 3 milliGRAM(s) Oral at bedtime PRN Insomnia  traMADol 25 milliGRAM(s) Oral every 6 hours PRN severe pain shoulders      RECENT LABS/IMAGING                              12.1   6.53  )-----------( 226      ( 16 Feb 2024 06:29 )             34.8     02-16    139  |  107  |  10  ----------------------------<  96  4.2   |  24  |  0.6<L>    Ca    9.3      16 Feb 2024 06:29  Mg     2.1     02-16        Urinalysis Basic - ( 16 Feb 2024 06:29 )    Color: x / Appearance: x / SG: x / pH: x  Gluc: 96 mg/dL / Ketone: x  / Bili: x / Urobili: x   Blood: x / Protein: x / Nitrite: x   Leuk Esterase: x / RBC: x / WBC x   Sq Epi: x / Non Sq Epi: x / Bacteria: x     Patient is an 83 y old female who presents with a chief complaint of Rehab of left cerebellar stroke with left hemiataxia.(nondominant) (07 Feb 2024 14:25)    HPI:  This is a 82 y/o RHD F with PMHx of HLD, HTN, L knee OA presenting for unsteady gait.    Patient states she was in her normal state of health a day prior to presentation. She was outside having lunch with her friend when she started to notice she was off balance that persists after coming back home. For worsening unsteady gait and imbalance she decided  to seek medical help the next day. She admits to vomiting, but denies any HA, diplopia, N, dizziness, numbness/tingling, speech difficulties. In the ED, vitals were significant for /72. CTH notable for L cerebellar subacute infarct. 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 NIHSS on admission was 3. Patient was admitted to stroke unit for further work up.     On 2/6/2204, the patient's Bp was elevated requiring 15mg of hydralazine overnight. CTH was repeated for possible worsening dysmetria, end gaze nystagmus, and NIHSS increase from 0 to 2 (for upper and lower left extremity dysmetria), urgent CTH showed no intracranial hemorrhage or midline shift. Left cerebellar hypodense lesion consistent with evolving subacute infarct.    The patient was evaluated by the PM&R team once medically stable. The patient was found to have functional limitation in terms of muscle strength, endurance, physical mobility, and ability to carry out activities of daily living (self care, transfers, and ambulation). The patient was started on a course of bedside therapy, the pt is motivated and able to start 3 hours of therapy  daily for 6-7 days a week. With PT/OT,  pt required Min A w transfers, Mod A w UB dressing, Mod A w LB dressing. Amb 15' using RW requiring Mod A. SLP evaluated the patient and recommended regular with thin liquids. The patient was deemed to be a good candidate for admission for acute inpatient rehab. The patient was admitted to acute inpatient rehab on 2/7/24.    (07 Feb 2024 14:25)      TODAY'S SUBJECTIVE & REVIEW OF SYMPTOMS:  Patient was seen and examined at bedside this morning. No acute overnight events. No new complaints offered this AM. Patient in good spirits and participating/tolerating therapies well. Voiding spontaneously and has regular BMs. Asymptomatic from COVID. Vitals reviewed and stable.       CLOF: PA for transfers, PA for UB dressing, Mod A for LB dressing. Ambulates 20ft with RW requiring PA, negotiated 1 steps w/2HR requiring Max A    Review of Systems:   Constitutional:    [x ] WNL           [   ] poor appetite   [   ] insomnia   [  ] tired   Cardio:                [ x ] WNL           [   ] CP   [  ] BARBOSA   [   ] palpitations               Resp:                   [ x  ] WNL           [   ] SOB   [  ] cough  [   ] wheezing   GI:                        [ x  ] WNL           [   ] constipation  [   ] diarrhea   [   ] abdominal pain   [   ] nausea   [   ] emesis                                :                      [ x  ] WNL           [   ] FRANCES  [   ] dysuria   [   ] difficulty voiding   []: Other:    Endo:                   [ x  ] WNL          [   ] polyuria   [   ] temperature intolerance                 Skin:                     [ x  ] WNL          [   ] pain   [   ] wound  [   ] rash   MSK:                    [  ] WNL          [  ] muscle pain   [ x ] joint pain/ stiffness-b/l knee pain L>R -stable  [   ] muscle tenderness   [   ] swelling   Neuro:                 [   ] WNL          [   ] HA   [   ] change in vision   [   ] tremor   [x  ] LSW weakness  [   ]dysphagia            Cognitive:           [ x ] WNL           [  ]confusion      Psych:                  [  x ] WNL           [   ] hallucinations   [   ]agitation   [   ] delusion   [   ]depression    PHYSICAL EXAM  Vital Signs Last 24 Hrs  T(C): 36.1 (18 Feb 2024 05:08), Max: 36.9 (17 Feb 2024 20:28)  T(F): 97 (18 Feb 2024 05:08), Max: 98.5 (17 Feb 2024 20:28)  HR: 68 (18 Feb 2024 05:08) (60 - 69)  BP: 164/72 (18 Feb 2024 05:08) (129/69 - 164/72)  BP(mean): --  RR: 18 (18 Feb 2024 05:08) (18 - 18)  SpO2: --    General:[ x  ] NAD, Resting Comfortable   [   ] other:                                HEENT: [ x  ] NC/AT, EOMI,  Normal Conjunctivae,   [   ] other:   Cardio: [  x ] RRR, no murmur,   [   ] other:                              Pulm: [ x  ] No Respiratory Distress,  Lungs CTAB,   [   ] other:             Abdomen: [ x  ]ND/NT, Soft,   [   ] other:    : [ x  ] NO FRANCES CATHETER, [   ] FRANCES CATHETER present [ x ] other: PureWick  MSK: [ x ] No joint swelling, Full ROM   [   ] other:                                         Ext: [x ]No C/C/E, No calf tenderness,   [ ]other:    Skin: [ x  ]intact,   [   ] other: wound                                               Neurological Examination:  Cognitive: [  x  ] AAO x 3,   [  ]  other:     Attention:  [ x  ] intact   [    ]  other:           Concentration: [ x  ] intact   [    ]  other:   Language: [  ] intact  [ x ] Able to name 3 objects and describe function          Memory: [   ] intact,    [ x ]  other: Impaired, able to recall 1/3 objects   Mood/Affect: [   ] wnl  [  x  ]  other: emotional labile (crying)                                                                          Communication: [ x  ]Fluent, no dysarthria [ ] other:   CN II - XII:  [    ] intact,  [  x  ] other: horizontal  nystagmus noted  with fixation                                                                                        Motor:   RUE: 5/5: shoulder abduction, elbow flexion, elbow extension,  finger flexion  LUE: 4+/5: shoulder abduction, 4+/5 elbow flexion, elbow extension, 4+/5 finger flexion  RLE: 5/5 hip flexion, knee ext, knee flex, ankle dorsiflexion, and ankle  plantarflexion  LLE: 4+/5: hip flexion, knee ext, knee flex, ankle dorsiflexion, and ankle  plantarflexion    Tone: [  x ] wnl,   [    ]  other:  DTRs: [ x ]symmetric, [   ] other:  Coordination:   [    ] intact,   [ x ] other: +FTN, and heel to shin on the L, + L dysdiadokinesia / dysmetria.                                                                     Sensory: [ x  ] Intact to light touch,   [  ] other:    No clonus; No spasticity    MEDICATIONS  (STANDING):  amLODIPine   Tablet 10 milliGRAM(s) Oral daily  aspirin enteric coated 81 milliGRAM(s) Oral daily  atorvastatin 80 milliGRAM(s) Oral at bedtime  carvedilol 6.25 milliGRAM(s) Oral every 12 hours  clopidogrel Tablet 75 milliGRAM(s) Oral daily  dextrose 5% + sodium chloride 0.45%. 1000 milliLiter(s) (75 mL/Hr) IV Continuous <Continuous>  enoxaparin Injectable 40 milliGRAM(s) SubCutaneous every 24 hours  famotidine    Tablet 20 milliGRAM(s) Oral two times a day  hydrochlorothiazide 12.5 milliGRAM(s) Oral daily  lisinopril 5 milliGRAM(s) Oral at bedtime  magnesium oxide 400 milliGRAM(s) Oral three times a day with meals  VOLTAREN  GEL  1%  TOPICAL OINTMENT 1 Application(s) 1 Application(s) Topical three times a day    MEDICATIONS  (PRN):  acetaminophen     Tablet .. 650 milliGRAM(s) Oral every 6 hours PRN Temp greater or equal to 38C (100.4F), Mild Pain (1 - 3)  melatonin 3 milliGRAM(s) Oral at bedtime PRN Insomnia  traMADol 25 milliGRAM(s) Oral every 6 hours PRN severe pain shoulders      RECENT LABS/IMAGING                              12.1   6.53  )-----------( 226      ( 16 Feb 2024 06:29 )             34.8     02-16    139  |  107  |  10  ----------------------------<  96  4.2   |  24  |  0.6<L>    Ca    9.3      16 Feb 2024 06:29  Mg     2.1     02-16

## 2024-02-19 LAB
ALBUMIN SERPL ELPH-MCNC: 3.6 G/DL — SIGNIFICANT CHANGE UP (ref 3.5–5.2)
ALP SERPL-CCNC: 80 U/L — SIGNIFICANT CHANGE UP (ref 30–115)
ALT FLD-CCNC: 43 U/L — HIGH (ref 0–41)
ANION GAP SERPL CALC-SCNC: 11 MMOL/L — SIGNIFICANT CHANGE UP (ref 7–14)
AST SERPL-CCNC: 27 U/L — SIGNIFICANT CHANGE UP (ref 0–41)
BASOPHILS # BLD AUTO: 0.04 K/UL — SIGNIFICANT CHANGE UP (ref 0–0.2)
BASOPHILS NFR BLD AUTO: 0.6 % — SIGNIFICANT CHANGE UP (ref 0–1)
BILIRUB SERPL-MCNC: 0.6 MG/DL — SIGNIFICANT CHANGE UP (ref 0.2–1.2)
BUN SERPL-MCNC: 13 MG/DL — SIGNIFICANT CHANGE UP (ref 10–20)
CALCIUM SERPL-MCNC: 9.9 MG/DL — SIGNIFICANT CHANGE UP (ref 8.4–10.4)
CHLORIDE SERPL-SCNC: 103 MMOL/L — SIGNIFICANT CHANGE UP (ref 98–110)
CO2 SERPL-SCNC: 23 MMOL/L — SIGNIFICANT CHANGE UP (ref 17–32)
CREAT SERPL-MCNC: 0.7 MG/DL — SIGNIFICANT CHANGE UP (ref 0.7–1.5)
EGFR: 86 ML/MIN/1.73M2 — SIGNIFICANT CHANGE UP
EOSINOPHIL # BLD AUTO: 0.27 K/UL — SIGNIFICANT CHANGE UP (ref 0–0.7)
EOSINOPHIL NFR BLD AUTO: 4 % — SIGNIFICANT CHANGE UP (ref 0–8)
GLUCOSE SERPL-MCNC: 95 MG/DL — SIGNIFICANT CHANGE UP (ref 70–99)
HCT VFR BLD CALC: 35.5 % — LOW (ref 37–47)
HGB BLD-MCNC: 12.3 G/DL — SIGNIFICANT CHANGE UP (ref 12–16)
IMM GRANULOCYTES NFR BLD AUTO: 0.4 % — HIGH (ref 0.1–0.3)
LYMPHOCYTES # BLD AUTO: 2.32 K/UL — SIGNIFICANT CHANGE UP (ref 1.2–3.4)
LYMPHOCYTES # BLD AUTO: 34.1 % — SIGNIFICANT CHANGE UP (ref 20.5–51.1)
MAGNESIUM SERPL-MCNC: 1.9 MG/DL — SIGNIFICANT CHANGE UP (ref 1.8–2.4)
MCHC RBC-ENTMCNC: 30.7 PG — SIGNIFICANT CHANGE UP (ref 27–31)
MCHC RBC-ENTMCNC: 34.6 G/DL — SIGNIFICANT CHANGE UP (ref 32–37)
MCV RBC AUTO: 88.5 FL — SIGNIFICANT CHANGE UP (ref 81–99)
MONOCYTES # BLD AUTO: 0.6 K/UL — SIGNIFICANT CHANGE UP (ref 0.1–0.6)
MONOCYTES NFR BLD AUTO: 8.8 % — SIGNIFICANT CHANGE UP (ref 1.7–9.3)
NEUTROPHILS # BLD AUTO: 3.54 K/UL — SIGNIFICANT CHANGE UP (ref 1.4–6.5)
NEUTROPHILS NFR BLD AUTO: 52.1 % — SIGNIFICANT CHANGE UP (ref 42.2–75.2)
NRBC # BLD: 0 /100 WBCS — SIGNIFICANT CHANGE UP (ref 0–0)
PLATELET # BLD AUTO: 222 K/UL — SIGNIFICANT CHANGE UP (ref 130–400)
PMV BLD: 10.2 FL — SIGNIFICANT CHANGE UP (ref 7.4–10.4)
POTASSIUM SERPL-MCNC: 4.1 MMOL/L — SIGNIFICANT CHANGE UP (ref 3.5–5)
POTASSIUM SERPL-SCNC: 4.1 MMOL/L — SIGNIFICANT CHANGE UP (ref 3.5–5)
PROT SERPL-MCNC: 5.9 G/DL — LOW (ref 6–8)
RBC # BLD: 4.01 M/UL — LOW (ref 4.2–5.4)
RBC # FLD: 12.6 % — SIGNIFICANT CHANGE UP (ref 11.5–14.5)
SARS-COV-2 RNA SPEC QL NAA+PROBE: DETECTED
SODIUM SERPL-SCNC: 137 MMOL/L — SIGNIFICANT CHANGE UP (ref 135–146)
WBC # BLD: 6.8 K/UL — SIGNIFICANT CHANGE UP (ref 4.8–10.8)
WBC # FLD AUTO: 6.8 K/UL — SIGNIFICANT CHANGE UP (ref 4.8–10.8)

## 2024-02-19 RX ADMIN — CARVEDILOL PHOSPHATE 6.25 MILLIGRAM(S): 80 CAPSULE, EXTENDED RELEASE ORAL at 06:05

## 2024-02-19 RX ADMIN — CLOPIDOGREL BISULFATE 75 MILLIGRAM(S): 75 TABLET, FILM COATED ORAL at 12:34

## 2024-02-19 RX ADMIN — Medication 81 MILLIGRAM(S): at 12:34

## 2024-02-19 RX ADMIN — FAMOTIDINE 20 MILLIGRAM(S): 10 INJECTION INTRAVENOUS at 18:24

## 2024-02-19 RX ADMIN — ATORVASTATIN CALCIUM 80 MILLIGRAM(S): 80 TABLET, FILM COATED ORAL at 21:23

## 2024-02-19 RX ADMIN — ENOXAPARIN SODIUM 40 MILLIGRAM(S): 100 INJECTION SUBCUTANEOUS at 22:00

## 2024-02-19 RX ADMIN — LISINOPRIL 5 MILLIGRAM(S): 2.5 TABLET ORAL at 21:24

## 2024-02-19 RX ADMIN — FAMOTIDINE 20 MILLIGRAM(S): 10 INJECTION INTRAVENOUS at 06:05

## 2024-02-19 RX ADMIN — CARVEDILOL PHOSPHATE 6.25 MILLIGRAM(S): 80 CAPSULE, EXTENDED RELEASE ORAL at 18:25

## 2024-02-19 RX ADMIN — MAGNESIUM OXIDE 400 MG ORAL TABLET 400 MILLIGRAM(S): 241.3 TABLET ORAL at 08:18

## 2024-02-19 RX ADMIN — AMLODIPINE BESYLATE 10 MILLIGRAM(S): 2.5 TABLET ORAL at 06:05

## 2024-02-19 NOTE — PROGRESS NOTE ADULT - ASSESSMENT
Assessment:  This is a 84 y/o right-handed F with PMHx of HLD, HTN, L knee OA presenting for worsening unsteady gait. In the ED, vitals were significant for /72. CTH notable for L cerebellar subacute infarct. 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 NIHSS on admission was 3 for ataxia L sided and LLE. Patient was admitted to stroke unit for further work up. On 2/6, the patient's Bp was elevated requiring 15mg of hydralazine overnight. CTH was repeated for possible worsening dysmetria, end gaze nystagmus, and NIHSS increase from 0 to 2 (for upper and lower left extremity dysmetria), urgent CTH showed no intracranial hemorrhage or midline shift. Left cerebellar hypodense lesion consistent with evolving subacute infarct.    The patient was evaluated by the PM&R team once medically stable. The patient was found to have functional limitation in terms of muscle strength, endurance, physical mobility, and ability to carry out activities of daily living (self care, transfers, and ambulation). The patient was started on a course of bedside therapy, the pt is motivated and able to start 3 hours of therapy  daily for 6-7 days a week. With PT/OT, pt required Min A w transfers, Mod A w UB dressing, Mod A w LB dressing. Amb 15' using RW requiring Mod A.  SLP evaluated the patient and recommended regular with thin liquids.The patient was deemed to be a good candidate for admission for acute inpatient rehab. The patient was admitted to acute inpatient rehab on 2/7/24.     Plan:  #Rehab for left cerebellar stroke with left hemiataxia.(nondominanat), and impaired cognition/ memory  likely due to small vessel dz vs A-A emboli  MRI brain w/out: Acute infarcts involving the left cerebellar hemisphere without hemorrhagic transformation  HbA1c 5.8 , TSH 2.10 and   TTE Mildly enlarged L atria. Normal EF  NIHSS+2= Ataxia in 2 limbs on admission to rehab  - S/p Loaded Aspirin 325mg and Plavix 300mg  - C/w Aspirin 81mg and Plavix 75mg daily for 90 days  - F/u EP for ILR placement  - HOB at 45 degrees, aspiration precautions  - PT/OT/ST/ neuropsych eval    #HTN  Home meds: Carvedilol 20 mg extended release PO daily, HCTZ 25 mg PO daily, Amlodipine 10 mg PO daily  - SBP goal: 110-150  - c/w HCTZ 12.5 mg once daily  - c/w carvedilol 6.25 mg q12hrs  - c/w amlodipine 10 mg once daily   -c/w lisinopril 5 mg at bedtime   -Will monitor and adjust accordingly     #HLD  - c/w lipitor 80 mg daily    #SARS-Cov-2 positive 2/9/2024  -No fever, no WBC count  -Isolation precautions  -Monitor   -re-test on 2/16 was positive, remains on isolation  - 10 days post positive PCR and asymptomatic Can come off Airborne Isolation    #Diarrhea (2/11)-resolved  -3 loose stool episodes in AM 2/11/2024  -bowel regimen held  - no recent complaint    #Bilat knee OA/ left knee chronic pain  - no NSAIDS for now  - Tylenol PRN for pain  - Modalities   - consider Voltaren cream and/ or lidoderm patch and Tylenol    #GI/Bowel Mgmt:/ Constipation  - finally had BM today with relief  - Miralax, senna (held 2/2 diarrhea)     - Diet: DASH/TLC    Precautions / PROPHYLAXIS:      - Falls      - DVT prophylaxis: Lovenox

## 2024-02-19 NOTE — PROGRESS NOTE ADULT - SUBJECTIVE AND OBJECTIVE BOX
Patient is an 83 y old female who presents with a chief complaint of Rehab of left cerebellar stroke with left hemiataxia.(nondominant) (07 Feb 2024 14:25)    HPI:  This is a 82 y/o RHD F with PMHx of HLD, HTN, L knee OA presenting for unsteady gait.    Patient states she was in her normal state of health a day prior to presentation. She was outside having lunch with her friend when she started to notice she was off balance that persists after coming back home. For worsening unsteady gait and imbalance she decided  to seek medical help the next day. She admits to vomiting, but denies any HA, diplopia, N, dizziness, numbness/tingling, speech difficulties. In the ED, vitals were significant for /72. CTH notable for L cerebellar subacute infarct. 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 NIHSS on admission was 3. Patient was admitted to stroke unit for further work up.     On 2/6/2204, the patient's Bp was elevated requiring 15mg of hydralazine overnight. CTH was repeated for possible worsening dysmetria, end gaze nystagmus, and NIHSS increase from 0 to 2 (for upper and lower left extremity dysmetria), urgent CTH showed no intracranial hemorrhage or midline shift. Left cerebellar hypodense lesion consistent with evolving subacute infarct.    The patient was evaluated by the PM&R team once medically stable. The patient was found to have functional limitation in terms of muscle strength, endurance, physical mobility, and ability to carry out activities of daily living (self care, transfers, and ambulation). The patient was started on a course of bedside therapy, the pt is motivated and able to start 3 hours of therapy  daily for 6-7 days a week. With PT/OT,  pt required Min A w transfers, Mod A w UB dressing, Mod A w LB dressing. Amb 15' using RW requiring Mod A. SLP evaluated the patient and recommended regular with thin liquids. The patient was deemed to be a good candidate for admission for acute inpatient rehab. The patient was admitted to acute inpatient rehab on 2/7/24.    (07 Feb 2024 14:25)      TODAY'S SUBJECTIVE & REVIEW OF SYMPTOMS:    No new complaints. No viral symptoms. Alert. Vitals and labs reviewed. Can come off Airborne isolation today. Participating well in therapies.    CLOF: PA for transfers, PA for UB dressing, Mod A for LB dressing. Ambulates 20ft with RW requiring PA, negotiated 1 steps w/2HR requiring Max A    Review of Systems:   Constitutional:    [x ] WNL           [   ] poor appetite   [   ] insomnia   [  ] tired   Cardio:                [ x ] WNL           [   ] CP   [  ] BARBOSA   [   ] palpitations               Resp:                   [ x  ] WNL           [   ] SOB   [  ] cough  [   ] wheezing   GI:                        [ x  ] WNL           [   ] constipation  [   ] diarrhea   [   ] abdominal pain   [   ] nausea   [   ] emesis                                :                      [ x  ] WNL           [   ] FRANCES  [   ] dysuria   [   ] difficulty voiding   []: Other:    Endo:                   [ x  ] WNL          [   ] polyuria   [   ] temperature intolerance                 Skin:                     [ x  ] WNL          [   ] pain   [   ] wound  [   ] rash   MSK:                    [  ] WNL          [  ] muscle pain   [ x ] joint pain/ stiffness-b/l knee pain L>R -stable  [   ] muscle tenderness   [   ] swelling   Neuro:                 [   ] WNL          [   ] HA   [   ] change in vision   [   ] tremor   [x  ] LSW weakness  [   ]dysphagia            Cognitive:           [ x ] WNL           [  ]confusion      Psych:                  [  x ] WNL           [   ] hallucinations   [   ]agitation   [   ] delusion   [   ]depression    PHYSICAL EXAM    Vital Signs Last 24 Hrs  T(C): 36.8 (19 Feb 2024 12:55), Max: 37 (18 Feb 2024 20:32)  T(F): 98.2 (19 Feb 2024 12:55), Max: 98.6 (18 Feb 2024 20:32)  HR: 63 (19 Feb 2024 12:55) (62 - 71)  BP: 131/68 (19 Feb 2024 12:55) (131/68 - 186/77)  BP(mean): --  RR: 18 (19 Feb 2024 12:55) (18 - 18)  SpO2: --      General:[ x  ] NAD, Resting Comfortable   [   ] other:                                HEENT: [ x  ] NC/AT, EOMI,  Normal Conjunctivae,   [   ] other:   Cardio: [  x ] RRR, no murmur,   [   ] other:                              Pulm: [ x  ] No Respiratory Distress,  Lungs CTAB,   [   ] other:             Abdomen: [ x  ]ND/NT, Soft,   [   ] other:    : [ x  ] NO FRANCES CATHETER, [   ] FRANCES CATHETER present [ x ] other: PureWick  MSK: [ x ] No joint swelling, Full ROM   [   ] other:                                         Ext: [x ]No C/C/E, No calf tenderness,   [ ]other:    Skin: [ x  ]intact,   [   ] other: wound                                               Neurological Examination:  Cognitive: [  x  ] AAO x 3,   [  ]  other:     Attention:  [ x  ] intact   [    ]  other:             Concentration: [ x  ] intact   [    ]  other:   Language: [  ] intact  [ x ] Able to name 3 objects and describe function          Memory: [   ] intact,    [ x ]  other: Impaired, able to recall 1/3 objects   Mood/Affect: [   ] wnl  [  x  ]  other: emotional labile (crying)                                                                          Communication: [ x  ]Fluent, no dysarthria [ ] other:   CN II - XII:  [    ] intact,  [  x  ] other: horizontal  nystagmus noted  with fixation                                                                                        Motor:   RUE: 5/5: shoulder abduction, elbow flexion, elbow extension,  finger flexion  LUE: 4+/5: shoulder abduction, 4+/5 elbow flexion, elbow extension, 4+/5 finger flexion  RLE: 5/5 hip flexion, knee ext, knee flex, ankle dorsiflexion, and ankle  plantarflexion  LLE: 4+/5: hip flexion, knee ext, knee flex, ankle dorsiflexion, and ankle  plantarflexion    Tone: [  x ] wnl,   [    ]  other:  DTRs: [ x ]symmetric, [   ] other:  Coordination:   [    ] intact,   [ x ] other: +FTN, and heel to shin on the L, + L dysdiadokinesia / dysmetria.                                                                     Sensory: [ x  ] Intact to light touch,   [  ] other:    No clonus; No spasticity    MEDICATIONS  (STANDING):  amLODIPine   Tablet 10 milliGRAM(s) Oral daily  aspirin enteric coated 81 milliGRAM(s) Oral daily  atorvastatin 80 milliGRAM(s) Oral at bedtime  carvedilol 6.25 milliGRAM(s) Oral every 12 hours  clopidogrel Tablet 75 milliGRAM(s) Oral daily  dextrose 5% + sodium chloride 0.45%. 1000 milliLiter(s) (75 mL/Hr) IV Continuous <Continuous>  enoxaparin Injectable 40 milliGRAM(s) SubCutaneous every 24 hours  famotidine    Tablet 20 milliGRAM(s) Oral two times a day  hydrochlorothiazide 12.5 milliGRAM(s) Oral daily  lisinopril 5 milliGRAM(s) Oral at bedtime  VOLTAREN  GEL  1%  TOPICAL OINTMENT 1 Application(s) 1 Application(s) Topical three times a day    MEDICATIONS  (PRN):  acetaminophen     Tablet .. 650 milliGRAM(s) Oral every 6 hours PRN Temp greater or equal to 38C (100.4F), Mild Pain (1 - 3)  melatonin 3 milliGRAM(s) Oral at bedtime PRN Insomnia  traMADol 25 milliGRAM(s) Oral every 6 hours PRN severe pain shoulders        RECENT LABS/IMAGING                          12.3   6.80  )-----------( 222      ( 19 Feb 2024 07:02 )             35.5     02-19    137  |  103  |  13  ----------------------------<  95  4.1   |  23  |  0.7    Ca    9.9      19 Feb 2024 07:02  Mg     1.9     02-19    TPro  5.9<L>  /  Alb  3.6  /  TBili  0.6  /  DBili  x   /  AST  27  /  ALT  43<H>  /  AlkPhos  80  02-19

## 2024-02-20 PROCEDURE — 33285 INSJ SUBQ CAR RHYTHM MNTR: CPT

## 2024-02-20 RX ADMIN — CARVEDILOL PHOSPHATE 6.25 MILLIGRAM(S): 80 CAPSULE, EXTENDED RELEASE ORAL at 17:13

## 2024-02-20 RX ADMIN — FAMOTIDINE 20 MILLIGRAM(S): 10 INJECTION INTRAVENOUS at 17:13

## 2024-02-20 RX ADMIN — FAMOTIDINE 20 MILLIGRAM(S): 10 INJECTION INTRAVENOUS at 06:02

## 2024-02-20 RX ADMIN — AMLODIPINE BESYLATE 10 MILLIGRAM(S): 2.5 TABLET ORAL at 06:03

## 2024-02-20 RX ADMIN — CLOPIDOGREL BISULFATE 75 MILLIGRAM(S): 75 TABLET, FILM COATED ORAL at 13:21

## 2024-02-20 RX ADMIN — ENOXAPARIN SODIUM 40 MILLIGRAM(S): 100 INJECTION SUBCUTANEOUS at 22:00

## 2024-02-20 RX ADMIN — CARVEDILOL PHOSPHATE 6.25 MILLIGRAM(S): 80 CAPSULE, EXTENDED RELEASE ORAL at 06:02

## 2024-02-20 RX ADMIN — ATORVASTATIN CALCIUM 80 MILLIGRAM(S): 80 TABLET, FILM COATED ORAL at 21:31

## 2024-02-20 RX ADMIN — Medication 81 MILLIGRAM(S): at 13:21

## 2024-02-20 NOTE — CHART NOTE - NSCHARTNOTEFT_GEN_A_CORE
EP PROCEDURE NOTE    Date of Procedure: 02-20-24  EP Attending: Dr. Agarwal  Assistant: KAYLI Stern  Referring Physician: Dr. Everett  Procedure: Loop Recorder Implant    Indication: 83y Female with history of CVA referred for implantable loop recorder.     Anesthesia: Local    EQUIPMENT IMPLANTED  : [employer]onic Linq  Model Number: [id number]  Serial Number: [id number]    PROCEDURE DESCRIPTION  The patient was brought to the electrophysiology procedure area in a non-sedated state and received preoperative antibiotics. Informed, written consent was obtained prior to the procedure. The left anterior chest region was prepped and cleaned from the nipple to the upper left clavicular border with serial applications of Chlorhexidine. Patient was then covered with sterile drapes in the usual manner. Blood pressure, oxygenation, and level of comfort were stable throughout.     Following infiltration with local anesthetic, a 1-cm incision was made along the left sternal border at the fourth intercostal space. Using the tunneling device the implantable loop recorder was inserted into the subcutaneous tissue. Direct pressure was applied to the wound to obtain hemostasis. The wound was approximated with Dermabond. Steri-strips and a dry, sterile dressing were placed over the wound. R waves were noted to be 0.29 mV.     The patient tolerated the procedure well.     COMPLICATIONS  No immediate complications    CONCLUSIONS  Successful loop recorder implant    EBL: 2 cc
HPI:  This is a 82 y/o RHD F with PMHx of HLD, HTN, L knee OA, hx of left shoulder fractures and arthritis olga lidia shoulders  presenting for unsteady gait.    Patient states she was in her normal state of health a day prior to presentation. She was outside having lunch with her friend when she started to notice she was off balance that persists after coming back home. For worsening unsteady gait and imbalance she decided  to seek medical help the next day. She admits to vomiting, but denies any HA, diplopia, N, dizziness, numbness/tingling, speech difficulties. In the ED, vitals were significant for /72. CTH notable for L cerebellar subacute infarct. 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 NIHSS on admission was 3. Patient was admitted to stroke unit for further work up.     On 2/6, the patient's Bp was elevated requiring 15mg of hydralazine overnight. CTH was repeated for possible worsening dysmetria, end gaze nystagmus, and NIHSS increase from 0 to 2 (for upper and lower left extremity dysmetria), urgent CTH showed no intracranial hemorrhage or midline shift. Left cerebellar hypodense lesion consistent with evolving subacute infarct. The patient was admitted to acute inpatient rehab on 2/7/24.     She was getting tramadol for pain shoulders on medical floor , she was requesting it , tramadol 25 mg q6h prn ordered  with warm compress  prn  she said she has allergy to lidocaine patches since it causes itching  .    Vital Signs Last 24 Hrs  T(C): 36.8 (08 Feb 2024 13:26), Max: 36.8 (07 Feb 2024 21:35)  T(F): 98.2 (08 Feb 2024 13:26), Max: 98.3 (07 Feb 2024 21:35)  HR: 66 (08 Feb 2024 13:26) (60 - 67)  BP: 142/62 (08 Feb 2024 13:26) (135/59 - 150/65)  BP(mean): 94 (07 Feb 2024 21:35) (76 - 94)  RR: 18 (08 Feb 2024 13:26) (18 - 20)  SpO2: 97% (07 Feb 2024 20:00) (97% - 97%)    MEDICATIONS  (STANDING):  amLODIPine   Tablet 10 milliGRAM(s) Oral daily  aspirin enteric coated 81 milliGRAM(s) Oral daily  atorvastatin 80 milliGRAM(s) Oral at bedtime  carvedilol 12.5 milliGRAM(s) Oral every 12 hours  clopidogrel Tablet 75 milliGRAM(s) Oral daily  enoxaparin Injectable 40 milliGRAM(s) SubCutaneous every 24 hours  famotidine    Tablet 20 milliGRAM(s) Oral two times a day  hydrochlorothiazide 25 milliGRAM(s) Oral daily  lisinopril 5 milliGRAM(s) Oral at bedtime  polyethylene glycol 3350 17 Gram(s) Oral daily    MEDICATIONS  (PRN):  acetaminophen     Tablet .. 650 milliGRAM(s) Oral every 6 hours PRN Temp greater or equal to 38C (100.4F), Mild Pain (1 - 3)  melatonin 3 milliGRAM(s) Oral at bedtime PRN Insomnia  senna 2 Tablet(s) Oral at bedtime PRN Constipation  traMADol 25 milliGRAM(s) Oral every 6 hours PRN severe pain shoulders                            12.8   8.48  )-----------( 178      ( 08 Feb 2024 07:50 )             38.4       02-08    139  |  103  |  34<H>  ----------------------------<  93  3.7   |  22  |  0.8    Ca    9.8      08 Feb 2024 07:50  Mg     2.0     02-08    TPro  5.8<L>  /  Alb  3.5  /  TBili  0.8  /  DBili  x   /  AST  26  /  ALT  31  /  AlkPhos  81  02-08              Urinalysis Basic - ( 08 Feb 2024 07:50 )    Color: x / Appearance: x / SG: x / pH: x  Gluc: 93 mg/dL / Ketone: x  / Bili: x / Urobili: x   Blood: x / Protein: x / Nitrite: x   Leuk Esterase: x / RBC: x / WBC x   Sq Epi: x / Non Sq Epi: x / Bacteria: x        Plan:  #Rehab of left cerebellar stroke with left hemiataxia .(nondominant), and impaired cognition/ memory  likely due to small vessel disease  vs A-A emboli  MRI brain w/out: Acute infarcts involving the left cerebellar hemisphere without hemorrhagic transformation  HbA1c 5.8 , TSH 2.10 and   TTE Mildly enlarged L atria. Normal EF  NIHSS+2= Ataxia in 2 limbs on admission to rehab  - S/p Loaded Aspirin 325mg and Plavix 300mg  - C/w Aspirin 81mg and Plavix 75mg daily for 90 days  - EP consulted. ILR to be placed likely on Monday  - HOB at 45 degrees, aspiration precautions  - PT/OT/ST/  neuropsych eval    #HTN  - SBP goal: 110-150  - c/w Carvedilol 20 mg extended release daily  - c/w Hctz 25mg daily  - c/w amlodipine 10mg once daily   -c/w lisinopril 5 mg at bedtime   -Will monitor and adjust accordingly     #HLD  - c/w lipitor 80mg daily    #Bilat knee OA/ left knee chronic pain  - no NSAIDS for now  - Tylenol PRN for pain  - Modalities   - consider Voltaren cream and/ or Lidoderm patch and Tylenol    tramadol 25 mg q6h prn ordered for Pain shoulders/ arthritis with old fracture left shoulder    #GI/Bowel Mgmt.. / Constipation  - finally had BM today with relief  - monitor on Miralax, Senna  FEN : Monitor Na levels    - Diet: DASH /TLC    - Falls  Precautions      - DVT prophylaxis: Lovenox
This is a 84 y/o RHD F with PMHx of HLD, HTN, L knee OA presenting for unsteady gait. admitted for cerebellar stroke , with hypertension was on more than 3 antihypertensive  from medical floor .     Patient states she was in her normal state of health a day prior to presentation. She was outside having lunch with her friend when she started to notice she was off balance that persists after coming back home. For worsening unsteady gait and imbalance she decided  to seek medical help the next day. She admits to vomiting, but denies any HA, diplopia, N, dizziness, numbness/tingling, speech difficulties. In the ED, vitals were significant for /72. CTH notable for L cerebellar subacute infarct. 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 NIHSS on admission was 3. Patient was admitted to stroke unit for further work up.   SLP evaluated the patient and recommended regular with thin liquids. The patient was deemed to be a good candidate for admission for acute inpatient rehab. The patient was admitted to acute inpatient rehab on 2/7/24.  She did not feel well today, c/o sore throat ,cough and letharginess , Flu with covid pending , cxr wnl , she has no fever , Iv fluids started  since she is dehydrated , and low energy with low bp , bun went upto 35 from 20 .  below  also had low bps ., medication adjusted as below .  iv lock obtained at 7 pm iv bolus started now . repeat labs in am, stopped HCTz since dehydrated     BP at 12 n 90/50    Vital Signs Last 24 Hrs  T(C): 36.8 (09 Feb 2024 12:10), Max: 36.8 (09 Feb 2024 12:10)  T(F): 98.3 (09 Feb 2024 12:10), Max: 98.3 (09 Feb 2024 12:10)  HR: 70 (09 Feb 2024 19:33) (62 - 75)  BP: 122/60 (09 Feb 2024 19:33) (97/54 - 138/63)    RR: 16 (09 Feb 2024 12:10) (16 - 19)  MEDICATIONS  (STANDING):  aspirin enteric coated 81 milliGRAM(s) Oral daily  atorvastatin 80 milliGRAM(s) Oral at bedtime  clopidogrel Tablet 75 milliGRAM(s) Oral daily  dextrose 5% + sodium chloride 0.45%. 1000 milliLiter(s) (75 mL/Hr) IV Continuous <Continuous>  enoxaparin Injectable 40 milliGRAM(s) SubCutaneous every 24 hours  famotidine    Tablet 20 milliGRAM(s) Oral two times a day  lisinopril 5 milliGRAM(s) Oral at bedtime  polyethylene glycol 3350 17 Gram(s) Oral daily  sodium chloride 0.9% Bolus 500 milliLiter(s) IV Bolus once  VOLTAREN  GEL  1%  TOPICAL OINTMENT 1 Application(s) 1 Application(s) Topical three times a day  Coreg 12.5 mg q12h   changed to 6.25 mg q12h     MEDICATIONS  (PRN):  acetaminophen     Tablet .. 650 milliGRAM(s) Oral every 6 hours PRN Temp greater or equal to 38C (100.4F), Mild Pain (1 - 3)  melatonin 3 milliGRAM(s) Oral at bedtime PRN Insomnia  senna 2 Tablet(s) Oral at bedtime PRN Constipation  traMADol 25 milliGRAM(s) Oral every 6 hours PRN severe pain shoulders                            12.8   8.48  )-----------( 178      ( 08 Feb 2024 07:50 )             38.4       02-09    137  |  102  |  35<H>  ----------------------------<  105<H>  3.8   |  23  |  0.8    Ca    9.8      09 Feb 2024 06:46  Mg     2.0     02-08    TPro  5.8<L>  /  Alb  3.5  /  TBili  0.8  /  DBili  x   /  AST  26  /  ALT  31  /  AlkPhos  81  02-08              Urinalysis Basic - ( 09 Feb 2024 06:46 )    Color: x / Appearance: x / SG: x / pH: x  Gluc: 105 mg/dL / Ketone: x  / Bili: x / Urobili: x   Blood: x / Protein: x / Nitrite: x   Leuk Esterase: x / RBC: x / WBC x   Sq Epi: x / Non Sq Epi: x / Bacteria: x      covid / flu swab ( pending )            CAPILLARY BLOOD GLUCOSE                MEDICATIONS  (PRN):  acetaminophen     Tablet .. 650 milliGRAM(s) Oral every 6 hours PRN Temp greater or equal to 38C (100.4F), Mild Pain (1 - 3)  melatonin 3 milliGRAM(s) Oral at bedtime PRN Insomnia  senna 2 Tablet(s) Oral at bedtime PRN Constipation  traMADol 25 milliGRAM(s) Oral every 6 hours PRN severe pain shoulders      Plan:  #Rehab of left cerebellar stroke with left hemiataxia.(nondominanat), and impaired cognition/ memory  likely due to small vessel dz vs A-A emboli  MRI brain w/out: Acute infarcts involving the left cerebellar hemisphere without hemorrhagic transformation  HbA1c 5.8 , TSH 2.10 and   TTE Mildly enlarged L atria. Normal EF  NIHSS+2= Ataxia in 2 limbs on admission to rehab  - S/p Loaded Aspirin 325mg and Plavix 300mg  - C/w Aspirin 81mg and Plavix 75mg daily for 90 days  - EP consulted. ILR to be placed likely on Monday  - HOB at 45 degrees, aspiration precautions  - PT/OT/ST/ neuropsych eval    #HTN  Had episode of low bp with extreme tiredness , bp 90/50 , Given IV bolus 5900 cc then to continue d51/2 saline at 75 cc per hour , Ef is WNP   - SBP goal: 110-150  - was on  Carvedilol 20 mg extended release daily, decreased to 6.25 mg q12h  with holding parameter sbp 130 or low   - Hctz 25mg daily-- discontinued   - amlodipine 10mg decreased to 5 mg from 2/10 once daily   -c/w lisinopril 5 mg at bedtime   -Will monitor and adjust accordingly     #HLD  - c/w lipitor 80mg daily    #Bilat knee OA/ left knee chronic pain also olga lidia shoulder arthritis  on tramadol prn and voltaren gel to area with pain   - no NSAIDS for now  - Tylenol PRN for pain  - Modalities   - consider Voltaren cream and/ or Lidoderm patch and Tylenol      #GI/Bowel Mgmt:/ Constipation  - finally had BM today with relief  - monitor on Miralax, Senna    -FEN : Monitor Na levels    - Diet: DASH/TLC    - Falls precautions        - DVT prophylaxis: Lovenox
Amlodipine  increased  back to 10 mg  daily  since after changing  dose bp in pm time was high ( more than 150 systolic  , coreg will be at reduced dose of 6.25 mg q12h  to prevent hypotension .      MEDICATIONS  (STANDING):  aspirin enteric coated 81 milliGRAM(s) Oral daily  atorvastatin 80 milliGRAM(s) Oral at bedtime  carvedilol 6.25 milliGRAM(s) Oral every 12 hours  clopidogrel Tablet 75 milliGRAM(s) Oral daily  dextrose 5% + sodium chloride 0.45%. 1000 milliLiter(s) (75 mL/Hr) IV Continuous <Continuous>  enoxaparin Injectable 40 milliGRAM(s) SubCutaneous every 24 hours  famotidine    Tablet 20 milliGRAM(s) Oral two times a day  lisinopril 5 milliGRAM(s) Oral at bedtime  magnesium oxide 400 milliGRAM(s) Oral three times a day with meals  magnesium sulfate  IVPB 2 Gram(s) IV Intermittent once  VOLTAREN  GEL  1%  TOPICAL OINTMENT 1 Application(s) 1 Application(s) Topical three times a day  Amlodipine 5 mg to 10 mg daily from 2/13/24 .   MEDICATIONS  (PRN):  acetaminophen     Tablet .. 650 milliGRAM(s) Oral every 6 hours PRN Temp greater or equal to 38C (100.4F), Mild Pain (1 - 3)  melatonin 3 milliGRAM(s) Oral at bedtime PRN Insomnia  traMADol 25 milliGRAM(s) Oral every 6 hours PRN severe pain shoulders     ICU Vital Signs Last 24 Hrs  T(C): 36.7 (12 Feb 2024 12:55), Max: 37 (11 Feb 2024 21:56)  T(F): 98 (12 Feb 2024 12:55), Max: 98.6 (11 Feb 2024 21:56)  HR: 69 (12 Feb 2024 12:55) (63 - 69)  BP: 139/69 (12 Feb 2024 12:55) (126/60 - 181/74)  RR: 20 (12 Feb 2024 12:55) (18 - 20)

## 2024-02-20 NOTE — PROGRESS NOTE ADULT - SUBJECTIVE AND OBJECTIVE BOX
Patient is an 83 y old female who presents with a chief complaint of Rehab of left cerebellar stroke with left hemiataxia.(nondominant) (07 Feb 2024 14:25)    HPI:  This is a 84 y/o RHD F with PMHx of HLD, HTN, L knee OA presenting for unsteady gait.    Patient states she was in her normal state of health a day prior to presentation. She was outside having lunch with her friend when she started to notice she was off balance that persists after coming back home. For worsening unsteady gait and imbalance she decided  to seek medical help the next day. She admits to vomiting, but denies any HA, diplopia, N, dizziness, numbness/tingling, speech difficulties. In the ED, vitals were significant for /72. CTH notable for L cerebellar subacute infarct. 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 NIHSS on admission was 3. Patient was admitted to stroke unit for further work up.     On 2/6/2204, the patient's Bp was elevated requiring 15mg of hydralazine overnight. CTH was repeated for possible worsening dysmetria, end gaze nystagmus, and NIHSS increase from 0 to 2 (for upper and lower left extremity dysmetria), urgent CTH showed no intracranial hemorrhage or midline shift. Left cerebellar hypodense lesion consistent with evolving subacute infarct.    The patient was evaluated by the PM&R team once medically stable. The patient was found to have functional limitation in terms of muscle strength, endurance, physical mobility, and ability to carry out activities of daily living (self care, transfers, and ambulation). The patient was started on a course of bedside therapy, the pt is motivated and able to start 3 hours of therapy  daily for 6-7 days a week. With PT/OT,  pt required Min A w transfers, Mod A w UB dressing, Mod A w LB dressing. Amb 15' using RW requiring Mod A. SLP evaluated the patient and recommended regular with thin liquids. The patient was deemed to be a good candidate for admission for acute inpatient rehab. The patient was admitted to acute inpatient rehab on 2/7/24.    (07 Feb 2024 14:25)      TODAY'S SUBJECTIVE & REVIEW OF SYMPTOMS:    Patient was seen and examined at bedside this morning. No acute overnight events. No new complaints offered this AM. Patient in good spirits and participating/tolerating therapies well. Voiding spontaneously and has regular BMs. Asymptomatic from COVID. Vitals reviewed and stable.    CLOF: PA for transfers, PA for UB dressing, Mod A for LB dressing. Ambulates 20ft with RW requiring PA, negotiated 1 steps w/2HR requiring Max A    Review of Systems:   Constitutional:    [x ] WNL           [   ] poor appetite   [   ] insomnia   [  ] tired   Cardio:                [ x ] WNL           [   ] CP   [  ] BARBOSA   [   ] palpitations               Resp:                   [ x  ] WNL           [   ] SOB   [  ] cough  [   ] wheezing   GI:                        [ x  ] WNL           [   ] constipation  [   ] diarrhea   [   ] abdominal pain   [   ] nausea   [   ] emesis                                :                      [ x  ] WNL           [   ] FRANCES  [   ] dysuria   [   ] difficulty voiding   []: Other:    Endo:                   [ x  ] WNL          [   ] polyuria   [   ] temperature intolerance                 Skin:                     [ x  ] WNL          [   ] pain   [   ] wound  [   ] rash   MSK:                    [  ] WNL          [  ] muscle pain   [ x ] joint pain/ stiffness-b/l knee pain L>R -stable  [   ] muscle tenderness   [   ] swelling   Neuro:                 [   ] WNL          [   ] HA   [   ] change in vision   [   ] tremor   [x  ] LSW weakness  [   ]dysphagia            Cognitive:           [ x ] WNL           [  ]confusion      Psych:                  [  x ] WNL           [   ] hallucinations   [   ]agitation   [   ] delusion   [   ]depression    PHYSICAL EXAM    Vital Signs Last 24 Hrs  Vital Signs Last 24 Hrs  T(C): 36.1 (20 Feb 2024 05:25), Max: 36.8 (19 Feb 2024 12:55)  T(F): 97 (20 Feb 2024 05:25), Max: 98.2 (19 Feb 2024 12:55)  HR: 61 (20 Feb 2024 05:25) (61 - 67)  BP: 146/64 (20 Feb 2024 05:25) (131/68 - 173/73)  BP(mean): 92 (20 Feb 2024 05:25) (92 - 105)  RR: 17 (20 Feb 2024 05:25) (17 - 19)    General:[ x  ] NAD, Resting Comfortable   [   ] other:                                HEENT: [ x  ] NC/AT, EOMI,  Normal Conjunctivae,   [   ] other:   Cardio: [  x ] RRR, no murmur,   [   ] other:                              Pulm: [ x  ] No Respiratory Distress,  Lungs CTAB,   [   ] other:             Abdomen: [ x  ]ND/NT, Soft,   [   ] other:    : [ x  ] NO FRANCES CATHETER, [   ] FRANCES CATHETER present [ x ] other: PureWick  MSK: [ x ] No joint swelling, Full ROM   [   ] other:                                         Ext: [x ]No C/C/E, No calf tenderness,   [ ]other:    Skin: [ x  ]intact,   [   ] other: wound                                               Neurological Examination:  Cognitive: [  x  ] AAO x 3,   [  ]  other:     Attention:  [ x  ] intact   [    ]  other:             Concentration: [ x  ] intact   [    ]  other:   Language: [  ] intact  [ x ] Able to name 3 objects and describe function          Memory: [   ] intact,    [ x ]  other: Impaired, able to recall 1/3 objects   Mood/Affect: [   ] wnl  [  x  ]  other: emotional labile (crying)                                                                          Communication: [ x  ]Fluent, no dysarthria [ ] other:   CN II - XII:  [    ] intact,  [  x  ] other: horizontal  nystagmus noted  with fixation                                                                                        Motor:   RUE: 5/5: shoulder abduction, elbow flexion, elbow extension,  finger flexion  LUE: 4+/5: shoulder abduction, 4+/5 elbow flexion, elbow extension, 4+/5 finger flexion  RLE: 5/5 hip flexion, knee ext, knee flex, ankle dorsiflexion, and ankle  plantarflexion  LLE: 4+/5: hip flexion, knee ext, knee flex, ankle dorsiflexion, and ankle  plantarflexion    Tone: [  x ] wnl,   [    ]  other:  DTRs: [ x ]symmetric, [   ] other:  Coordination:   [    ] intact,   [ x ] other: +FTN, and heel to shin on the L, + L dysdiadokinesia / dysmetria.                                                                     Sensory: [ x  ] Intact to light touch,   [  ] other:    No clonus; No spasticity    MEDICATIONS  (STANDING):  amLODIPine   Tablet 10 milliGRAM(s) Oral daily  aspirin enteric coated 81 milliGRAM(s) Oral daily  atorvastatin 80 milliGRAM(s) Oral at bedtime  carvedilol 6.25 milliGRAM(s) Oral every 12 hours  clopidogrel Tablet 75 milliGRAM(s) Oral daily  dextrose 5% + sodium chloride 0.45%. 1000 milliLiter(s) (75 mL/Hr) IV Continuous <Continuous>  enoxaparin Injectable 40 milliGRAM(s) SubCutaneous every 24 hours  famotidine    Tablet 20 milliGRAM(s) Oral two times a day  hydrochlorothiazide 12.5 milliGRAM(s) Oral daily  lisinopril 5 milliGRAM(s) Oral at bedtime  VOLTAREN  GEL  1%  TOPICAL OINTMENT 1 Application(s) 1 Application(s) Topical three times a day    MEDICATIONS  (PRN):  acetaminophen     Tablet .. 650 milliGRAM(s) Oral every 6 hours PRN Temp greater or equal to 38C (100.4F), Mild Pain (1 - 3)  melatonin 3 milliGRAM(s) Oral at bedtime PRN Insomnia  traMADol 25 milliGRAM(s) Oral every 6 hours PRN severe pain shoulders      RECENT LABS/IMAGING                           12.3   6.80  )-----------( 222      ( 19 Feb 2024 07:02 )             35.5     02-19    137  |  103  |  13  ----------------------------<  95  4.1   |  23  |  0.7    Ca    9.9      19 Feb 2024 07:02  Mg     1.9     02-19    TPro  5.9<L>  /  Alb  3.6  /  TBili  0.6  /  DBili  x   /  AST  27  /  ALT  43<H>  /  AlkPhos  80  02-19      Urinalysis Basic - ( 19 Feb 2024 07:02 )    Color: x / Appearance: x / SG: x / pH: x  Gluc: 95 mg/dL / Ketone: x  / Bili: x / Urobili: x   Blood: x / Protein: x / Nitrite: x   Leuk Esterase: x / RBC: x / WBC x   Sq Epi: x / Non Sq Epi: x / Bacteria: x                 Patient is an 83 y old female who presents with a chief complaint of Rehab of left cerebellar stroke with left hemiataxia.(nondominant) (07 Feb 2024 14:25)    HPI:  This is a 84 y/o RHD F with PMHx of HLD, HTN, L knee OA presenting for unsteady gait.    Patient states she was in her normal state of health a day prior to presentation. She was outside having lunch with her friend when she started to notice she was off balance that persists after coming back home. For worsening unsteady gait and imbalance she decided  to seek medical help the next day. She admits to vomiting, but denies any HA, diplopia, N, dizziness, numbness/tingling, speech difficulties. In the ED, vitals were significant for /72. CTH notable for L cerebellar subacute infarct. 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 NIHSS on admission was 3. Patient was admitted to stroke unit for further work up.     On 2/6/2204, the patient's Bp was elevated requiring 15mg of hydralazine overnight. CTH was repeated for possible worsening dysmetria, end gaze nystagmus, and NIHSS increase from 0 to 2 (for upper and lower left extremity dysmetria), urgent CTH showed no intracranial hemorrhage or midline shift. Left cerebellar hypodense lesion consistent with evolving subacute infarct.    The patient was evaluated by the PM&R team once medically stable. The patient was found to have functional limitation in terms of muscle strength, endurance, physical mobility, and ability to carry out activities of daily living (self care, transfers, and ambulation). The patient was started on a course of bedside therapy, the pt is motivated and able to start 3 hours of therapy  daily for 6-7 days a week. With PT/OT,  pt required Min A w transfers, Mod A w UB dressing, Mod A w LB dressing. Amb 15' using RW requiring Mod A. SLP evaluated the patient and recommended regular with thin liquids. The patient was deemed to be a good candidate for admission for acute inpatient rehab. The patient was admitted to acute inpatient rehab on 2/7/24.    (07 Feb 2024 14:25)      TODAY'S SUBJECTIVE & REVIEW OF SYMPTOMS:    Patient was seen and examined at bedside this morning. No acute overnight events. No new complaints offered this AM. Patient in good spirits and participating/tolerating therapies well. Voiding spontaneously and has regular BMs.    Vitals reviewed and stable.  Remains asymptomatic from COVID.   Patient might get a ILP placed today     CLOF: PA for transfers, PA for UB dressing, Mod A for LB dressing. Ambulates 20ft with RW requiring PA, negotiated 1 steps w/2HR requiring Max A    Review of Systems:   Constitutional:    [x ] WNL           [   ] poor appetite   [   ] insomnia   [  ] tired   Cardio:                [ x ] WNL           [   ] CP   [  ] BARBOSA   [   ] palpitations               Resp:                   [ x  ] WNL           [   ] SOB   [  ] cough  [   ] wheezing   GI:                        [ x  ] WNL           [   ] constipation  [   ] diarrhea   [   ] abdominal pain   [   ] nausea   [   ] emesis                                :                      [ x  ] WNL           [   ] FRANCES  [   ] dysuria   [   ] difficulty voiding   []: Other:    Endo:                   [ x  ] WNL          [   ] polyuria   [   ] temperature intolerance                 Skin:                     [ x  ] WNL          [   ] pain   [   ] wound  [   ] rash   MSK:                    [  ] WNL          [  ] muscle pain   [ x ] joint pain/ stiffness-b/l knee pain L>R -stable  [   ] muscle tenderness   [   ] swelling   Neuro:                 [   ] WNL          [   ] HA   [   ] change in vision   [   ] tremor   [x  ] LSW weakness  [   ]dysphagia            Cognitive:           [ x ] WNL           [  ]confusion      Psych:                  [  x ] WNL           [   ] hallucinations   [   ]agitation   [   ] delusion   [   ]depression    PHYSICAL EXAM    Vital Signs Last 24 Hrs  Vital Signs Last 24 Hrs  T(C): 36.1 (20 Feb 2024 05:25), Max: 36.8 (19 Feb 2024 12:55)  T(F): 97 (20 Feb 2024 05:25), Max: 98.2 (19 Feb 2024 12:55)  HR: 61 (20 Feb 2024 05:25) (61 - 67)  BP: 146/64 (20 Feb 2024 05:25) (131/68 - 173/73)  BP(mean): 92 (20 Feb 2024 05:25) (92 - 105)  RR: 17 (20 Feb 2024 05:25) (17 - 19)    General:[ x  ] NAD, Resting Comfortable   [   ] other:                                HEENT: [ x  ] NC/AT, EOMI,  Normal Conjunctivae,   [   ] other:   Cardio: [  x ] RRR, no murmur,   [   ] other:                              Pulm: [ x  ] No Respiratory Distress,  Lungs CTAB,   [   ] other:             Abdomen: [ x  ]ND/NT, Soft,   [   ] other:    : [ x  ] NO FRANCES CATHETER, [   ] FRANCES CATHETER present [ x ] other: PureWick  MSK: [ x ] No joint swelling, Full ROM   [   ] other:                                         Ext: [x ]No C/C/E, No calf tenderness,   [ ]other:    Skin: [ x  ]intact,   [   ] other: wound                                               Neurological Examination:  Cognitive: [  x  ] AAO x 3,   [  ]  other:     Attention:  [ x  ] intact   [    ]  other:             Concentration: [ x  ] intact   [    ]  other:   Language: [  ] intact  [ x ] Able to name 3 objects and describe function          Memory: [   ] intact,    [ x ]  other: Impaired, able to recall 1/3 objects   Mood/Affect: [   ] wnl  [  x  ]  other: emotional labile (crying)                                                                          Communication: [ x  ]Fluent, no dysarthria [ ] other:   CN II - XII:  [    ] intact,  [  x  ] other: horizontal  nystagmus noted  with fixation                                                                                        Motor:   RUE: 5/5: shoulder abduction, elbow flexion, elbow extension,  finger flexion  LUE: 4+/5: shoulder abduction, 4+/5 elbow flexion, elbow extension, 4+/5 finger flexion  RLE: 5/5 hip flexion, knee ext, knee flex, ankle dorsiflexion, and ankle  plantarflexion  LLE: 4+/5: hip flexion, knee ext, knee flex, ankle dorsiflexion, and ankle  plantarflexion    Tone: [  x ] wnl,   [    ]  other:  DTRs: [ x ]symmetric, [   ] other:  Coordination:   [    ] intact,   [ x ] other: +FTN, and heel to shin on the L, + L dysdiadokinesia / dysmetria.                                                                     Sensory: [ x  ] Intact to light touch,   [  ] other:    No clonus; No spasticity    MEDICATIONS  (STANDING):  amLODIPine   Tablet 10 milliGRAM(s) Oral daily  aspirin enteric coated 81 milliGRAM(s) Oral daily  atorvastatin 80 milliGRAM(s) Oral at bedtime  carvedilol 6.25 milliGRAM(s) Oral every 12 hours  clopidogrel Tablet 75 milliGRAM(s) Oral daily  dextrose 5% + sodium chloride 0.45%. 1000 milliLiter(s) (75 mL/Hr) IV Continuous <Continuous>  enoxaparin Injectable 40 milliGRAM(s) SubCutaneous every 24 hours  famotidine    Tablet 20 milliGRAM(s) Oral two times a day  hydrochlorothiazide 12.5 milliGRAM(s) Oral daily  lisinopril 5 milliGRAM(s) Oral at bedtime  VOLTAREN  GEL  1%  TOPICAL OINTMENT 1 Application(s) 1 Application(s) Topical three times a day    MEDICATIONS  (PRN):  acetaminophen     Tablet .. 650 milliGRAM(s) Oral every 6 hours PRN Temp greater or equal to 38C (100.4F), Mild Pain (1 - 3)  melatonin 3 milliGRAM(s) Oral at bedtime PRN Insomnia  traMADol 25 milliGRAM(s) Oral every 6 hours PRN severe pain shoulders      RECENT LABS/IMAGING                           12.3   6.80  )-----------( 222      ( 19 Feb 2024 07:02 )             35.5     02-19    137  |  103  |  13  ----------------------------<  95  4.1   |  23  |  0.7    Ca    9.9      19 Feb 2024 07:02  Mg     1.9     02-19    TPro  5.9<L>  /  Alb  3.6  /  TBili  0.6  /  DBili  x   /  AST  27  /  ALT  43<H>  /  AlkPhos  80  02-19      Urinalysis Basic - ( 19 Feb 2024 07:02 )    Color: x / Appearance: x / SG: x / pH: x  Gluc: 95 mg/dL / Ketone: x  / Bili: x / Urobili: x   Blood: x / Protein: x / Nitrite: x   Leuk Esterase: x / RBC: x / WBC x   Sq Epi: x / Non Sq Epi: x / Bacteria: x

## 2024-02-20 NOTE — PROGRESS NOTE ADULT - ASSESSMENT
Neuropsychology Follow Up           Treatment focused on: Cognitive Remediation     Pain: No  Location: N/A Ratin/10  Intervention: N/A     Orientation: [facility]      Arousal Level: Alert     Behavior: Cooperative with testing.       Affect/Mood Range: WFL       Needed: No   #:  N/A     Attention: [facility]     Insight into illness/deficits: [facility]          Description of Current Injury(s):     Left cerebellar stroke (CVA).          Notable Effects of Current Injury(s):     Left hemiataxia (non-dominant), unsteady gait, and linguistic and cognitive deficits.            Other Noted Medical History:     Hypertension, hyperlipidemia, and osteoarthritis.            Reason(s) for Visit:     Cognitive remediation was completed to help build attention and memory skills.          Measure(s)/Intervention(s) Given: [employer]          Clinical Summary:     Patient required clarification and/or repetition of task instructions.  She performed better on an attention task when whereas she had more difficulty progressing on an immediate verbal and spatial memory task.  Overall, she demonstrated a learning curve on tasks, benefiting from a sample trial, feedback, and repetition.  Her performance on tasks improved as she incorporated strategies and feedback. Overall, she evidenced difficulties learning and retaining information as increased in demands. The plan is to continue to monitor cognition and provide cognitive remediation in the area of executive functioning moving forward.           Recommendations:     Patient will benefit from memory and attention aids such as reminders and repetition of instructions, corrective feedback, sample items, redirection, refocusing, breaking tasks down into smaller parts, and visual input.  Reducing the rate of presentation of new information with checks for understanding may also be beneficial.  Environmental distractions should be reduced as much as possible.  Continue to provide cognitive remediation to help strengthen executive functioning such as attention and memory abilities.  Provide information surrounding healthy lifestyle changes such as diet and sleep hygiene.          Goals: Monitor cognition, support cognitive recovery and adjustment          Plan: Cognitive remediation; psychoeducation; provide feedback prior to discharge; 1-2 sessions weekly at 30-60 minutes each

## 2024-02-20 NOTE — PROGRESS NOTE ADULT - ASSESSMENT
Assessment:  This is a 82 y/o right-handed F with PMHx of HLD, HTN, L knee OA presenting for worsening unsteady gait. In the ED, vitals were significant for /72. CTH notable for L cerebellar subacute infarct. 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 NIHSS on admission was 3 for ataxia L sided and LLE. Patient was admitted to stroke unit for further work up. On 2/6, the patient's Bp was elevated requiring 15mg of hydralazine overnight. CTH was repeated for possible worsening dysmetria, end gaze nystagmus, and NIHSS increase from 0 to 2 (for upper and lower left extremity dysmetria), urgent CTH showed no intracranial hemorrhage or midline shift. Left cerebellar hypodense lesion consistent with evolving subacute infarct.    The patient was evaluated by the PM&R team once medically stable. The patient was found to have functional limitation in terms of muscle strength, endurance, physical mobility, and ability to carry out activities of daily living (self care, transfers, and ambulation). The patient was started on a course of bedside therapy, the pt is motivated and able to start 3 hours of therapy  daily for 6-7 days a week. With PT/OT, pt required Min A w transfers, Mod A w UB dressing, Mod A w LB dressing. Amb 15' using RW requiring Mod A.  SLP evaluated the patient and recommended regular with thin liquids.The patient was deemed to be a good candidate for admission for acute inpatient rehab. The patient was admitted to acute inpatient rehab on 2/7/24.     Plan:  #Rehab for left cerebellar stroke with left hemiataxia.(nondominanat), and impaired cognition/ memory  likely due to small vessel dz vs A-A emboli  MRI brain w/out: Acute infarcts involving the left cerebellar hemisphere without hemorrhagic transformation  HbA1c 5.8 , TSH 2.10 and   TTE Mildly enlarged L atria. Normal EF  NIHSS+2= Ataxia in 2 limbs on admission to rehab  - S/p Loaded Aspirin 325mg and Plavix 300mg  - C/w Aspirin 81mg and Plavix 75mg daily for 90 days  - F/u EP for ILR placement  - HOB at 45 degrees, aspiration precautions  - PT/OT/ST/ neuropsych eval    #HTN  Home meds: Carvedilol 20 mg extended release PO daily, HCTZ 25 mg PO daily, Amlodipine 10 mg PO daily  - SBP goal: 110-150  - c/w HCTZ 12.5 mg once daily  - c/w carvedilol 6.25 mg q12hrs  - c/w amlodipine 10 mg once daily   -c/w lisinopril 5 mg at bedtime   -Will monitor and adjust accordingly     #HLD  - c/w lipitor 80 mg daily    #SARS-Cov-2 positive 2/9/2024  -No fever, no WBC count  -Isolation precautions  -Monitor   -re-test on 2/16 was positive, remains on isolation  - 10 days post positive PCR and asymptomatic Can come off Airborne Isolation    #Diarrhea (2/11)-resolved  -3 loose stool episodes in AM 2/11/2024  -bowel regimen held  - no recent complaint    #Bilat knee OA/ left knee chronic pain  - no NSAIDS for now  - Tylenol PRN for pain  - Modalities   - consider Voltaren cream and/ or lidoderm patch and Tylenol    #GI/Bowel Mgmt:/ Constipation  - finally had BM today with relief  - Miralax, senna (held 2/2 diarrhea)     - Diet: DASH/TLC    Precautions / PROPHYLAXIS:      - Falls      - DVT prophylaxis: Lovenox        Assessment:  This is a 84 y/o right-handed F with PMHx of HLD, HTN, L knee OA presenting for worsening unsteady gait. In the ED, vitals were significant for /72. CTH notable for L cerebellar subacute infarct. 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 NIHSS on admission was 3 for ataxia L sided and LLE. Patient was admitted to stroke unit for further work up. On 2/6, the patient's Bp was elevated requiring 15mg of hydralazine overnight. CTH was repeated for possible worsening dysmetria, end gaze nystagmus, and NIHSS increase from 0 to 2 (for upper and lower left extremity dysmetria), urgent CTH showed no intracranial hemorrhage or midline shift. Left cerebellar hypodense lesion consistent with evolving subacute infarct.    The patient was evaluated by the PM&R team once medically stable. The patient was found to have functional limitation in terms of muscle strength, endurance, physical mobility, and ability to carry out activities of daily living (self care, transfers, and ambulation). The patient was started on a course of bedside therapy, the pt is motivated and able to start 3 hours of therapy  daily for 6-7 days a week. With PT/OT, pt required Min A w transfers, Mod A w UB dressing, Mod A w LB dressing. Amb 15' using RW requiring Mod A.  SLP evaluated the patient and recommended regular with thin liquids.The patient was deemed to be a good candidate for admission for acute inpatient rehab. The patient was admitted to acute inpatient rehab on 2/7/24.     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 Normal EF  NIHSS+2= Ataxia in 2 limbs on admission to rehab  - S/p Loaded Aspirin 325mg and Plavix 300mg  - C/w Aspirin 81mg and Plavix 75mg daily for 90 days  - F/u EP for ILR placement  - HOB at 45 degrees, aspiration precautions  - PT/OT/ST/ neuropsych eval    #HTN  Home meds: Carvedilol 20 mg extended release PO daily, HCTZ 25 mg PO daily, Amlodipine 10 mg PO daily  - SBP goal: 110-150  - c/w HCTZ 12.5 mg once daily  - c/w carvedilol 6.25 mg q12hrs  - c/w amlodipine 10 mg once daily   -c/w lisinopril 5 mg at bedtime   -Will monitor and adjust accordingly     #HLD  - c/w lipitor 80 mg daily    #SARS-Cov-2 positive 2/9/2024  -No fever, no WBC count  -Isolation precautions  -Monitor   -re-test on 2/16 was positive, remains on isolation  - 10 days post positive PCR and asymptomatic Can come off Airborne Isolation    #Diarrhea (2/11)-resolved  -3 loose stool episodes in AM 2/11/2024  -bowel regimen held  - no recent complaint    #Bilat knee OA/ left knee chronic pain  - no NSAIDS for now  - Tylenol PRN for pain  - Modalities   - consider Voltaren cream and/ or lidoderm patch and Tylenol    #GI/Bowel Mgmt:/ Constipation  - finally had BM today with relief  - Miralax, senna (held 2/2 diarrhea)     - Diet: DASH/TLC    Precautions / PROPHYLAXIS:      - Falls      - DVT prophylaxis: Lovenox

## 2024-02-20 NOTE — PROGRESS NOTE ADULT - ATTENDING COMMENTS
Patient seen and examined with the resident. We discussed the case. I have directed the care. I edited the note. The patient requires acute rehab with 3 hours of daily therapies at least 5 out of 7 days and close physiatry follow up.  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 Normal EF  NIHSS+2= Ataxia in 2 limbs on admission to rehab  - S/p Loaded Aspirin 325mg and Plavix 300mg  - C/w Aspirin 81mg and Plavix 75mg daily for 90 days  - F/u EP for ILR placement  - HOB at 45 degrees, aspiration precautions  - PT/OT/ST/ neuropsych eval    #HTN  Home meds: Carvedilol 20 mg extended release PO daily, HCTZ 25 mg PO daily, Amlodipine 10 mg PO daily  - SBP goal: 110-150  - c/w HCTZ 12.5 mg once daily  - c/w carvedilol 6.25 mg q12hrs  - c/w amlodipine 10 mg once daily   -c/w lisinopril 5 mg at bedtime   -Will monitor and adjust accordingly     #HLD  - c/w lipitor 80 mg daily    #SARS-Cov-2 positive 2/9/2024  -No fever, no WBC count  -Isolation precautions  -Monitor   -re-test on 2/16 was positive, remains on isolation  - 10 days post positive PCR and asymptomatic Can come off Airborne Isolation    #Diarrhea (2/11)-resolved  -3 loose stool episodes in AM 2/11/2024  -bowel regimen held  - no recent complaint    #Bilat knee OA/ left knee chronic pain  - no NSAIDS for now  - Tylenol PRN for pain  - Modalities   - consider Voltaren cream and/ or lidoderm patch and Tylenol    #GI/Bowel Mgmt:/ Constipation  - finally had BM today with relief  - Miralax, senna (held 2/2 diarrhea)

## 2024-02-21 RX ORDER — CHLORHEXIDINE GLUCONATE 213 G/1000ML
1 SOLUTION TOPICAL
Refills: 0 | Status: DISCONTINUED | OUTPATIENT
Start: 2024-02-21 | End: 2024-03-02

## 2024-02-21 RX ORDER — TRAMADOL HYDROCHLORIDE 50 MG/1
25 TABLET ORAL EVERY 6 HOURS
Refills: 0 | Status: DISCONTINUED | OUTPATIENT
Start: 2024-02-24 | End: 2024-03-02

## 2024-02-21 RX ADMIN — ATORVASTATIN CALCIUM 80 MILLIGRAM(S): 80 TABLET, FILM COATED ORAL at 21:52

## 2024-02-21 RX ADMIN — CARVEDILOL PHOSPHATE 6.25 MILLIGRAM(S): 80 CAPSULE, EXTENDED RELEASE ORAL at 06:36

## 2024-02-21 RX ADMIN — AMLODIPINE BESYLATE 10 MILLIGRAM(S): 2.5 TABLET ORAL at 06:36

## 2024-02-21 RX ADMIN — ENOXAPARIN SODIUM 40 MILLIGRAM(S): 100 INJECTION SUBCUTANEOUS at 21:52

## 2024-02-21 RX ADMIN — LISINOPRIL 5 MILLIGRAM(S): 2.5 TABLET ORAL at 21:52

## 2024-02-21 RX ADMIN — FAMOTIDINE 20 MILLIGRAM(S): 10 INJECTION INTRAVENOUS at 06:36

## 2024-02-21 RX ADMIN — CLOPIDOGREL BISULFATE 75 MILLIGRAM(S): 75 TABLET, FILM COATED ORAL at 13:01

## 2024-02-21 RX ADMIN — CARVEDILOL PHOSPHATE 6.25 MILLIGRAM(S): 80 CAPSULE, EXTENDED RELEASE ORAL at 18:01

## 2024-02-21 RX ADMIN — Medication 81 MILLIGRAM(S): at 13:01

## 2024-02-21 RX ADMIN — FAMOTIDINE 20 MILLIGRAM(S): 10 INJECTION INTRAVENOUS at 18:01

## 2024-02-21 NOTE — PROGRESS NOTE ADULT - ATTENDING COMMENTS
Patient seen and examined with the resident. We discussed the case. I have directed the care. I edited the note. The patient requires acute rehab with 3 hours of daily therapies at least 5 out of 7 days and close physiatry follow up. I participated in the rehab interdisciplinary team meeting; the patient progressed to ambulate 50 ft RW cg assist.  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 Normal EF  NIHSS+2= Ataxia in 2 limbs on admission to rehab  - S/p Loaded Aspirin 325mg and Plavix 300mg  - C/w Aspirin 81mg and Plavix 75mg daily for 90 days  - F/u EP for ILR placement  - HOB at 45 degrees, aspiration precautions  - PT/OT/ST/ neuropsych eval  - ILP placed 2/21    #HTN  Home meds: Carvedilol 20 mg extended release PO daily, HCTZ 25 mg PO daily, Amlodipine 10 mg PO daily  - SBP goal: 110-150  - c/w HCTZ 12.5 mg once daily  - c/w carvedilol 6.25 mg q12hrs  - c/w amlodipine 10 mg once daily   -c/w lisinopril 5 mg at bedtime   -Will monitor and adjust accordingly     #HLD  - c/w lipitor 80 mg daily    #SARS-Cov-2 positive 2/9/2024  -No fever, no WBC count  -Isolation precautions  -Monitor   -re-test on 2/16 was positive, remains on isolation  - 10 days post positive PCR and asymptomatic Can come off Airborne Isolation    #Diarrhea (2/11)-resolved  -3 loose stool episodes in AM 2/11/2024  -bowel regimen held  - no recent complaint    #Bilat knee OA/ left knee chronic pain  - no NSAIDS for now  - Tylenol PRN for pain  - Modalities   - consider Voltaren cream and/ or lidoderm patch and Tylenol    #GI/Bowel Mgmt:/ Constipation  - finally had BM today with relief  - Miralax, senna (held 2/2 diarrhea) Patient seen and examined with the resident. We discussed the case. I have directed the care. I edited the note. The patient requires acute rehab with 3 hours of daily therapies at least 5 out of 7 days and close physiatry follow up. I participated in the rehab interdisciplinary team meeting; the patient progressed to ambulate 50 ft RW cg assist. She had a loop recorder placed yesterday.   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 Normal EF  NIHSS+2= Ataxia in 2 limbs on admission to rehab  - S/p Loaded Aspirin 325mg and Plavix 300mg  - C/w Aspirin 81mg and Plavix 75mg daily for 90 days  - F/u EP for ILR placement  - HOB at 45 degrees, aspiration precautions  - PT/OT/ST/ neuropsych eval  - ILP placed 2/21    #HTN  Home meds: Carvedilol 20 mg extended release PO daily, HCTZ 25 mg PO daily, Amlodipine 10 mg PO daily  - SBP goal: 110-150  - c/w HCTZ 12.5 mg once daily  - c/w carvedilol 6.25 mg q12hrs  - c/w amlodipine 10 mg once daily   -c/w lisinopril 5 mg at bedtime   -Will monitor and adjust accordingly     #HLD  - c/w lipitor 80 mg daily    #SARS-Cov-2 positive 2/9/2024  -No fever, no WBC count  -Isolation precautions  -Monitor   -re-test on 2/16 was positive, remains on isolation  - 10 days post positive PCR and asymptomatic Can come off Airborne Isolation    #Diarrhea (2/11)-resolved  -3 loose stool episodes in AM 2/11/2024  -bowel regimen held  - no recent complaint    #Bilat knee OA/ left knee chronic pain  - no NSAIDS for now  - Tylenol PRN for pain  - Modalities   - consider Voltaren cream and/ or lidoderm patch and Tylenol    #GI/Bowel Mgmt:/ Constipation  - finally had BM today with relief  - Miralax, senna (held 2/2 diarrhea)

## 2024-02-21 NOTE — PROGRESS NOTE ADULT - TIME BILLING
Patient seen and examined with the resident. We discussed the case. I have directed the care. I edited the note. The patient requires acute rehab with 3 hours of daily therapies at least 5 out of 7 days and close physiatry follow up. I participated in the rehab interdisciplinary team meeting; the patient progressed to ambulate 50 ft RW cg assist. Patient seen and examined with the resident. We discussed the case. I have directed the care. I edited the note. The patient requires acute rehab with 3 hours of daily therapies at least 5 out of 7 days and close physiatry follow up. I participated in the rehab interdisciplinary team meeting; the patient progressed to ambulate 50 ft RW cg assist. She had a loop recorder placed yesterday.

## 2024-02-21 NOTE — PROGRESS NOTE ADULT - ASSESSMENT
Assessment:  This is a 82 y/o right-handed F with PMHx of HLD, HTN, L knee OA presenting for worsening unsteady gait. In the ED, vitals were significant for /72. CTH notable for L cerebellar subacute infarct. 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 NIHSS on admission was 3 for ataxia L sided and LLE. Patient was admitted to stroke unit for further work up. On 2/6, the patient's Bp was elevated requiring 15mg of hydralazine overnight. CTH was repeated for possible worsening dysmetria, end gaze nystagmus, and NIHSS increase from 0 to 2 (for upper and lower left extremity dysmetria), urgent CTH showed no intracranial hemorrhage or midline shift. Left cerebellar hypodense lesion consistent with evolving subacute infarct.    The patient was evaluated by the PM&R team once medically stable. The patient was found to have functional limitation in terms of muscle strength, endurance, physical mobility, and ability to carry out activities of daily living (self care, transfers, and ambulation). The patient was started on a course of bedside therapy, the pt is motivated and able to start 3 hours of therapy  daily for 6-7 days a week. With PT/OT, pt required Min A w transfers, Mod A w UB dressing, Mod A w LB dressing. Amb 15' using RW requiring Mod A.  SLP evaluated the patient and recommended regular with thin liquids.The patient was deemed to be a good candidate for admission for acute inpatient rehab. The patient was admitted to acute inpatient rehab on 2/7/24.     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 Normal EF  NIHSS+2= Ataxia in 2 limbs on admission to rehab  - S/p Loaded Aspirin 325mg and Plavix 300mg  - C/w Aspirin 81mg and Plavix 75mg daily for 90 days  - F/u EP for ILR placement  - HOB at 45 degrees, aspiration precautions  - PT/OT/ST/ neuropsych eval  - ILP placed 2/21    #HTN  Home meds: Carvedilol 20 mg extended release PO daily, HCTZ 25 mg PO daily, Amlodipine 10 mg PO daily  - SBP goal: 110-150  - c/w HCTZ 12.5 mg once daily  - c/w carvedilol 6.25 mg q12hrs  - c/w amlodipine 10 mg once daily   -c/w lisinopril 5 mg at bedtime   -Will monitor and adjust accordingly     #HLD  - c/w lipitor 80 mg daily    #SARS-Cov-2 positive 2/9/2024  -No fever, no WBC count  -Isolation precautions  -Monitor   -re-test on 2/16 was positive, remains on isolation  - 10 days post positive PCR and asymptomatic Can come off Airborne Isolation    #Diarrhea (2/11)-resolved  -3 loose stool episodes in AM 2/11/2024  -bowel regimen held  - no recent complaint    #Bilat knee OA/ left knee chronic pain  - no NSAIDS for now  - Tylenol PRN for pain  - Modalities   - consider Voltaren cream and/ or lidoderm patch and Tylenol    #GI/Bowel Mgmt:/ Constipation  - finally had BM today with relief  - Miralax, senna (held 2/2 diarrhea)     - Diet: DASH/TLC    Precautions / PROPHYLAXIS:      - Falls      - DVT prophylaxis: Lovenox

## 2024-02-21 NOTE — PROGRESS NOTE ADULT - SUBJECTIVE AND OBJECTIVE BOX
Patient is an 83 y old female who presents with a chief complaint of Rehab of left cerebellar stroke with left hemiataxia.(nondominant) (07 Feb 2024 14:25)    HPI:  This is a 82 y/o RHD F with PMHx of HLD, HTN, L knee OA presenting for unsteady gait.    Patient states she was in her normal state of health a day prior to presentation. She was outside having lunch with her friend when she started to notice she was off balance that persists after coming back home. For worsening unsteady gait and imbalance she decided  to seek medical help the next day. She admits to vomiting, but denies any HA, diplopia, N, dizziness, numbness/tingling, speech difficulties. In the ED, vitals were significant for /72. CTH notable for L cerebellar subacute infarct. 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 NIHSS on admission was 3. Patient was admitted to stroke unit for further work up.     On 2/6/2204, the patient's Bp was elevated requiring 15mg of hydralazine overnight. CTH was repeated for possible worsening dysmetria, end gaze nystagmus, and NIHSS increase from 0 to 2 (for upper and lower left extremity dysmetria), urgent CTH showed no intracranial hemorrhage or midline shift. Left cerebellar hypodense lesion consistent with evolving subacute infarct.    The patient was evaluated by the PM&R team once medically stable. The patient was found to have functional limitation in terms of muscle strength, endurance, physical mobility, and ability to carry out activities of daily living (self care, transfers, and ambulation). The patient was started on a course of bedside therapy, the pt is motivated and able to start 3 hours of therapy  daily for 6-7 days a week. With PT/OT,  pt required Min A w transfers, Mod A w UB dressing, Mod A w LB dressing. Amb 15' using RW requiring Mod A. SLP evaluated the patient and recommended regular with thin liquids. The patient was deemed to be a good candidate for admission for acute inpatient rehab. The patient was admitted to acute inpatient rehab on 2/7/24.    (07 Feb 2024 14:25)      TODAY'S SUBJECTIVE & REVIEW OF SYMPTOMS:    Patient was seen and examined at bedside this morning. No acute overnight events. She had a ILP placed yesterday. No new complaints offered this AM. Patient in good spirits and participating/tolerating therapies well. Voiding spontaneously and has regular BMs. Vitals reviewed and stable.    CLOF: PA for transfers, PA for UB dressing, Mod A for LB dressing. Ambulates 20ft with RW requiring PA, negotiated 1 steps w/2HR requiring Max A    Review of Systems:   Constitutional:    [x ] WNL           [   ] poor appetite   [   ] insomnia   [  ] tired   Cardio:                [ x ] WNL           [   ] CP   [  ] BARBOSA   [   ] palpitations               Resp:                   [ x  ] WNL           [   ] SOB   [  ] cough  [   ] wheezing   GI:                        [ x  ] WNL           [   ] constipation  [   ] diarrhea   [   ] abdominal pain   [   ] nausea   [   ] emesis                                :                      [ x  ] WNL           [   ] FRANCES  [   ] dysuria   [   ] difficulty voiding   []: Other:    Endo:                   [ x  ] WNL          [   ] polyuria   [   ] temperature intolerance                 Skin:                     [ x  ] WNL          [   ] pain   [   ] wound  [   ] rash   MSK:                    [  ] WNL          [  ] muscle pain   [ x ] joint pain/ stiffness-b/l knee pain L>R -stable  [   ] muscle tenderness   [   ] swelling   Neuro:                 [   ] WNL          [   ] HA   [   ] change in vision   [   ] tremor   [x  ] LSW weakness  [   ]dysphagia            Cognitive:           [ x ] WNL           [  ]confusion      Psych:                  [  x ] WNL           [   ] hallucinations   [   ]agitation   [   ] delusion   [   ]depression    PHYSICAL EXAM    Vital Signs Last 24 Hrs  T(C): 36.2 (21 Feb 2024 06:59), Max: 36.7 (20 Feb 2024 20:12)  T(F): 97.1 (21 Feb 2024 06:59), Max: 98 (20 Feb 2024 20:12)  HR: 68 (21 Feb 2024 06:59) (65 - 70)  BP: 152/68 (21 Feb 2024 06:59) (115/61 - 152/68)  BP(mean): 98 (21 Feb 2024 06:59) (90 - 98)  RR: 19 (21 Feb 2024 06:59) (19 - 20)  SpO2: --    General:[ x  ] NAD, Resting Comfortable   [   ] other:                                HEENT: [ x  ] NC/AT, EOMI,  Normal Conjunctivae,   [   ] other:   Cardio: [  x ] RRR, no murmur,   [   ] other:                              Pulm: [ x  ] No Respiratory Distress,  Lungs CTAB,   [   ] other:             Abdomen: [ x  ]ND/NT, Soft,   [   ] other:    : [ x  ] NO FRANCES CATHETER, [   ] FRANCES CATHETER present [ x ] other: PureWick  MSK: [ x ] No joint swelling, Full ROM   [   ] other:                                         Ext: [x ]No C/C/E, No calf tenderness,   [ ]other:    Skin: [   ]intact,   [ x  ] other: incision along L sternal area c/d/i.     Neurological Examination:  Cognitive: [  x  ] AAO x 3,   [  ]  other:     Attention:  [ x  ] intact   [    ]  other:             Concentration: [ x  ] intact   [    ]  other:   Language: [  ] intact  [ x ] Able to name 3 objects and describe function          Memory: [   ] intact,    [ x ]  other: Impaired, able to recall 1/3 objects   Mood/Affect: [   ] wnl  [  x  ]  other: emotional labile (crying)                                                                          Communication: [ x  ]Fluent, no dysarthria [ ] other:   CN II - XII:  [    ] intact,  [  x  ] other: horizontal  nystagmus noted  with fixation                                                                                        Motor:   RUE: 5/5: shoulder abduction, elbow flexion, elbow extension,  finger flexion  LUE: 4+/5: shoulder abduction, 4+/5 elbow flexion, elbow extension, 4+/5 finger flexion  RLE: 5/5 hip flexion, knee ext, knee flex, ankle dorsiflexion, and ankle  plantarflexion  LLE: 4+/5: hip flexion, knee ext, knee flex, ankle dorsiflexion, and ankle  plantarflexion    Tone: [  x ] wnl,   [    ]  other:  DTRs: [ x ]symmetric, [   ] other:  Coordination:   [    ] intact,   [ x ] other: +FTN, and heel to shin on the L, + L dysdiadokinesia / dysmetria.                                                                     Sensory: [ x  ] Intact to light touch,   [  ] other:    No clonus; No spasticity    MEDICATIONS  (STANDING):  amLODIPine   Tablet 10 milliGRAM(s) Oral daily  aspirin enteric coated 81 milliGRAM(s) Oral daily  atorvastatin 80 milliGRAM(s) Oral at bedtime  carvedilol 6.25 milliGRAM(s) Oral every 12 hours  clopidogrel Tablet 75 milliGRAM(s) Oral daily  dextrose 5% + sodium chloride 0.45%. 1000 milliLiter(s) (75 mL/Hr) IV Continuous <Continuous>  enoxaparin Injectable 40 milliGRAM(s) SubCutaneous every 24 hours  famotidine    Tablet 20 milliGRAM(s) Oral two times a day  hydrochlorothiazide 12.5 milliGRAM(s) Oral daily  lisinopril 5 milliGRAM(s) Oral at bedtime  VOLTAREN  GEL  1%  TOPICAL OINTMENT 1 Application(s) 1 Application(s) Topical three times a day    MEDICATIONS  (PRN):  acetaminophen     Tablet .. 650 milliGRAM(s) Oral every 6 hours PRN Temp greater or equal to 38C (100.4F), Mild Pain (1 - 3)  melatonin 3 milliGRAM(s) Oral at bedtime PRN Insomnia  traMADol 25 milliGRAM(s) Oral every 6 hours PRN severe pain shoulders      RECENT LABS/IMAGING                           12.3   6.80  )-----------( 222      ( 19 Feb 2024 07:02 )             35.5     02-19    137  |  103  |  13  ----------------------------<  95  4.1   |  23  |  0.7    Ca    9.9      19 Feb 2024 07:02  Mg     1.9     02-19    TPro  5.9<L>  /  Alb  3.6  /  TBili  0.6  /  DBili  x   /  AST  27  /  ALT  43<H>  /  AlkPhos  80  02-19      Urinalysis Basic - ( 19 Feb 2024 07:02 )    Color: x / Appearance: x / SG: x / pH: x  Gluc: 95 mg/dL / Ketone: x  / Bili: x / Urobili: x   Blood: x / Protein: x / Nitrite: x   Leuk Esterase: x / RBC: x / WBC x   Sq Epi: x / Non Sq Epi: x / Bacteria: x

## 2024-02-22 RX ORDER — CARVEDILOL PHOSPHATE 80 MG/1
6.25 CAPSULE, EXTENDED RELEASE ORAL EVERY 12 HOURS
Refills: 0 | Status: DISCONTINUED | OUTPATIENT
Start: 2024-02-22 | End: 2024-03-02

## 2024-02-22 RX ORDER — AMLODIPINE BESYLATE 2.5 MG/1
10 TABLET ORAL DAILY
Refills: 0 | Status: DISCONTINUED | OUTPATIENT
Start: 2024-02-22 | End: 2024-02-23

## 2024-02-22 RX ADMIN — CLOPIDOGREL BISULFATE 75 MILLIGRAM(S): 75 TABLET, FILM COATED ORAL at 13:01

## 2024-02-22 RX ADMIN — LISINOPRIL 5 MILLIGRAM(S): 2.5 TABLET ORAL at 21:39

## 2024-02-22 RX ADMIN — CARVEDILOL PHOSPHATE 6.25 MILLIGRAM(S): 80 CAPSULE, EXTENDED RELEASE ORAL at 17:55

## 2024-02-22 RX ADMIN — FAMOTIDINE 20 MILLIGRAM(S): 10 INJECTION INTRAVENOUS at 17:53

## 2024-02-22 RX ADMIN — ATORVASTATIN CALCIUM 80 MILLIGRAM(S): 80 TABLET, FILM COATED ORAL at 21:39

## 2024-02-22 RX ADMIN — FAMOTIDINE 20 MILLIGRAM(S): 10 INJECTION INTRAVENOUS at 06:01

## 2024-02-22 RX ADMIN — ENOXAPARIN SODIUM 40 MILLIGRAM(S): 100 INJECTION SUBCUTANEOUS at 21:39

## 2024-02-22 RX ADMIN — Medication 81 MILLIGRAM(S): at 13:01

## 2024-02-22 RX ADMIN — CHLORHEXIDINE GLUCONATE 1 APPLICATION(S): 213 SOLUTION TOPICAL at 06:01

## 2024-02-22 NOTE — PROGRESS NOTE ADULT - SUBJECTIVE AND OBJECTIVE BOX
Patient is an 83 y old female who presents with a chief complaint of Rehab of left cerebellar stroke with left hemiataxia.(nondominant) (07 Feb 2024 14:25)    HPI:  This is a 82 y/o RHD F with PMHx of HLD, HTN, L knee OA presenting for unsteady gait.    Patient states she was in her normal state of health a day prior to presentation. She was outside having lunch with her friend when she started to notice she was off balance that persists after coming back home. For worsening unsteady gait and imbalance she decided  to seek medical help the next day. She admits to vomiting, but denies any HA, diplopia, N, dizziness, numbness/tingling, speech difficulties. In the ED, vitals were significant for /72. CTH notable for L cerebellar subacute infarct. 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 NIHSS on admission was 3. Patient was admitted to stroke unit for further work up.     On 2/6/2204, the patient's Bp was elevated requiring 15mg of hydralazine overnight. CTH was repeated for possible worsening dysmetria, end gaze nystagmus, and NIHSS increase from 0 to 2 (for upper and lower left extremity dysmetria), urgent CTH showed no intracranial hemorrhage or midline shift. Left cerebellar hypodense lesion consistent with evolving subacute infarct.    The patient was evaluated by the PM&R team once medically stable. The patient was found to have functional limitation in terms of muscle strength, endurance, physical mobility, and ability to carry out activities of daily living (self care, transfers, and ambulation). The patient was started on a course of bedside therapy, the pt is motivated and able to start 3 hours of therapy  daily for 6-7 days a week. With PT/OT,  pt required Min A w transfers, Mod A w UB dressing, Mod A w LB dressing. Amb 15' using RW requiring Mod A. SLP evaluated the patient and recommended regular with thin liquids. The patient was deemed to be a good candidate for admission for acute inpatient rehab. The patient was admitted to acute inpatient rehab on 2/7/24.    (07 Feb 2024 14:25)      TODAY'S SUBJECTIVE & REVIEW OF SYMPTOMS:    Patient was seen and examined at bedside this morning. No acute overnight events. No new complaints offered this AM. Patient in good spirits and participating/tolerating therapies well. Voiding spontaneously and has regular BMs.     Vitals reviewed and stable.    CLOF: SA for transfers, Mod A for UB dressing, Mod A for LB dressing. Ambulates 50ft with RW requiring SA    Review of Systems:   Constitutional:    [x ] WNL           [   ] poor appetite   [   ] insomnia   [  ] tired   Cardio:                [ x ] WNL           [   ] CP   [  ] BARBOSA   [   ] palpitations               Resp:                   [ x  ] WNL           [   ] SOB   [  ] cough  [   ] wheezing   GI:                        [ x  ] WNL           [   ] constipation  [   ] diarrhea   [   ] abdominal pain   [   ] nausea   [   ] emesis                                :                      [ x  ] WNL           [   ] FRANCES  [   ] dysuria   [   ] difficulty voiding   []: Other:    Endo:                   [ x  ] WNL          [   ] polyuria   [   ] temperature intolerance                 Skin:                     [ x  ] WNL          [   ] pain   [   ] wound  [   ] rash   MSK:                    [  ] WNL          [  ] muscle pain   [ x ] joint pain/ stiffness-b/l knee pain L>R -stable  [   ] muscle tenderness   [   ] swelling   Neuro:                 [   ] WNL          [   ] HA   [   ] change in vision   [   ] tremor   [x  ] LSW weakness  [   ]dysphagia            Cognitive:           [ x ] WNL           [  ]confusion      Psych:                  [  x ] WNL           [   ] hallucinations   [   ]agitation   [   ] delusion   [   ]depression    PHYSICAL EXAM    Vital Signs Last 24 Hrs  T(C): 36.4 (22 Feb 2024 05:07), Max: 36.6 (21 Feb 2024 19:45)  T(F): 97.5 (22 Feb 2024 05:07), Max: 97.9 (21 Feb 2024 19:45)  HR: 56 (22 Feb 2024 05:07) (56 - 71)  BP: 115/53 (22 Feb 2024 05:07) (115/53 - 144/65)  BP(mean): 94 (21 Feb 2024 19:45) (94 - 94)  RR: 19 (22 Feb 2024 05:07) (18 - 20)    General:[ x  ] NAD, Resting Comfortable   [   ] other:                                HEENT: [ x  ] NC/AT, EOMI,  Normal Conjunctivae,   [   ] other:   Cardio: [  x ] RRR, no murmur,   [   ] other:                              Pulm: [ x  ] No Respiratory Distress,  Lungs CTAB,   [   ] other:             Abdomen: [ x  ]ND/NT, Soft,   [   ] other:    : [ x  ] NO FRANCES CATHETER, [   ] FRANCES CATHETER present [ x ] other: PureWick  MSK: [ x ] No joint swelling, Full ROM   [   ] other:                                         Ext: [x ]No C/C/E, No calf tenderness,   [ ]other:    Skin: [   ]intact,   [ x  ] other: incision along L sternal area c/d/i.     Neurological Examination:  Cognitive: [  x  ] AAO x 3,   [  ]  other:     Attention:  [ x  ] intact   [    ]  other:             Concentration: [ x  ] intact   [    ]  other:   Language: [  ] intact  [ x ] Able to name 3 objects and describe function          Memory: [   ] intact,    [ x ]  other: Impaired, able to recall 1/3 objects   Mood/Affect: [   ] wnl  [  x  ]  other: emotional labile (crying)                                                                          Communication: [ x  ]Fluent, no dysarthria [ ] other:   CN II - XII:  [    ] intact,  [  x  ] other: horizontal  nystagmus noted  with fixation                                                                                        Motor:   RUE: 5/5: shoulder abduction, elbow flexion, elbow extension,  finger flexion  LUE: 4+/5: shoulder abduction, 4+/5 elbow flexion, elbow extension, 4+/5 finger flexion  RLE: 5/5 hip flexion, knee ext, knee flex, ankle dorsiflexion, and ankle  plantarflexion  LLE: 4+/5: hip flexion, knee ext, knee flex, ankle dorsiflexion, and ankle  plantarflexion    Tone: [  x ] wnl,   [    ]  other:  DTRs: [ x ]symmetric, [   ] other:  Coordination:   [    ] intact,   [ x ] other: +FTN, and heel to shin on the L, + L dysdiadokinesia / dysmetria.                                                                     Sensory: [ x  ] Intact to light touch,   [  ] other:    No clonus; No spasticity    MEDICATIONS  (STANDING):  amLODIPine   Tablet 10 milliGRAM(s) Oral daily  aspirin enteric coated 81 milliGRAM(s) Oral daily  atorvastatin 80 milliGRAM(s) Oral at bedtime  carvedilol 6.25 milliGRAM(s) Oral every 12 hours  chlorhexidine 2% Cloths 1 Application(s) Topical <User Schedule>  clopidogrel Tablet 75 milliGRAM(s) Oral daily  dextrose 5% + sodium chloride 0.45%. 1000 milliLiter(s) (75 mL/Hr) IV Continuous <Continuous>  enoxaparin Injectable 40 milliGRAM(s) SubCutaneous every 24 hours  famotidine    Tablet 20 milliGRAM(s) Oral two times a day  hydrochlorothiazide 12.5 milliGRAM(s) Oral daily  lisinopril 5 milliGRAM(s) Oral at bedtime  VOLTAREN  GEL  1%  TOPICAL OINTMENT 1 Application(s) 1 Application(s) Topical three times a day    MEDICATIONS  (PRN):  acetaminophen     Tablet .. 650 milliGRAM(s) Oral every 6 hours PRN Temp greater or equal to 38C (100.4F), Mild Pain (1 - 3)  melatonin 3 milliGRAM(s) Oral at bedtime PRN Insomnia  traMADol 25 milliGRAM(s) Oral every 6 hours PRN severe pain shoulders      RECENT LABS/IMAGING                           12.3   6.80  )-----------( 222      ( 19 Feb 2024 07:02 )             35.5     02-19    137  |  103  |  13  ----------------------------<  95  4.1   |  23  |  0.7    Ca    9.9      19 Feb 2024 07:02  Mg     1.9     02-19    TPro  5.9<L>  /  Alb  3.6  /  TBili  0.6  /  DBili  x   /  AST  27  /  ALT  43<H>  /  AlkPhos  80  02-19      Urinalysis Basic - ( 19 Feb 2024 07:02 )    Color: x / Appearance: x / SG: x / pH: x  Gluc: 95 mg/dL / Ketone: x  / Bili: x / Urobili: x   Blood: x / Protein: x / Nitrite: x   Leuk Esterase: x / RBC: x / WBC x   Sq Epi: x / Non Sq Epi: x / Bacteria: x                 Patient is an 83 y old female who presents with a chief complaint of Rehab of left cerebellar stroke with left hemiataxia.(nondominant) (07 Feb 2024 14:25)    HPI:  This is a 84 y/o RHD F with PMHx of HLD, HTN, L knee OA presenting for unsteady gait.    Patient states she was in her normal state of health a day prior to presentation. She was outside having lunch with her friend when she started to notice she was off balance that persists after coming back home. For worsening unsteady gait and imbalance she decided  to seek medical help the next day. She admits to vomiting, but denies any HA, diplopia, N, dizziness, numbness/tingling, speech difficulties. In the ED, vitals were significant for /72. CTH notable for L cerebellar subacute infarct. 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 NIHSS on admission was 3. Patient was admitted to stroke unit for further work up.     On 2/6/2204, the patient's Bp was elevated requiring 15mg of hydralazine overnight. CTH was repeated for possible worsening dysmetria, end gaze nystagmus, and NIHSS increase from 0 to 2 (for upper and lower left extremity dysmetria), urgent CTH showed no intracranial hemorrhage or midline shift. Left cerebellar hypodense lesion consistent with evolving subacute infarct.    The patient was evaluated by the PM&R team once medically stable. The patient was found to have functional limitation in terms of muscle strength, endurance, physical mobility, and ability to carry out activities of daily living (self care, transfers, and ambulation). The patient was started on a course of bedside therapy, the pt is motivated and able to start 3 hours of therapy  daily for 6-7 days a week. With PT/OT,  pt required Min A w transfers, Mod A w UB dressing, Mod A w LB dressing. Amb 15' using RW requiring Mod A. SLP evaluated the patient and recommended regular with thin liquids. The patient was deemed to be a good candidate for admission for acute inpatient rehab. The patient was admitted to acute inpatient rehab on 2/7/24.    (07 Feb 2024 14:25)      TODAY'S SUBJECTIVE & REVIEW OF SYMPTOMS:    Patient was seen and examined at bedside this morning. No acute overnight events. No new complaints offered this AM. Patient in good spirits and participating/tolerating therapies well and making functional gains  Voiding spontaneously and has regular BMs.     Vitals reviewed and stable.    CLOF: SA for transfers, Mod A for UB dressing, Mod A for LB dressing. Ambulates 50ft with RW requiring SA.    Review of Systems:   Constitutional:    [x ] WNL           [   ] poor appetite   [   ] insomnia   [  ] tired   Cardio:                [ x ] WNL           [   ] CP   [  ] BARBOSA   [   ] palpitations               Resp:                   [ x  ] WNL           [   ] SOB   [  ] cough  [   ] wheezing   GI:                        [ x  ] WNL           [   ] constipation  [   ] diarrhea   [   ] abdominal pain   [   ] nausea   [   ] emesis                                :                      [ x  ] WNL           [   ] FRANCES  [   ] dysuria   [   ] difficulty voiding   []: Other:    Endo:                   [ x  ] WNL          [   ] polyuria   [   ] temperature intolerance                 Skin:                     [ x  ] WNL          [   ] pain   [   ] wound  [   ] rash   MSK:                    [  ] WNL          [  ] muscle pain   [ x ] joint pain/ stiffness-b/l knee pain L>R -stable  [   ] muscle tenderness   [   ] swelling   Neuro:                 [   ] WNL          [   ] HA   [   ] change in vision   [   ] tremor   [x  ] LSW weakness  [   ]dysphagia            Cognitive:           [ x ] WNL           [  ]confusion      Psych:                  [  x ] WNL           [   ] hallucinations   [   ]agitation   [   ] delusion   [   ]depression    PHYSICAL EXAM    Vital Signs Last 24 Hrs  T(C): 36.4 (22 Feb 2024 05:07), Max: 36.6 (21 Feb 2024 19:45)  T(F): 97.5 (22 Feb 2024 05:07), Max: 97.9 (21 Feb 2024 19:45)  HR: 56 (22 Feb 2024 05:07) (56 - 71)  BP: 115/53 (22 Feb 2024 05:07) (115/53 - 144/65)  BP(mean): 94 (21 Feb 2024 19:45) (94 - 94)  RR: 19 (22 Feb 2024 05:07) (18 - 20)    General:[ x  ] NAD, Resting Comfortable   [   ] other:                                HEENT: [ x  ] NC/AT, EOMI,  Normal Conjunctivae,   [   ] other:   Cardio: [  x ] RRR, no murmur,   [   ] other:                              Pulm: [ x  ] No Respiratory Distress,  Lungs CTAB,   [   ] other:             Abdomen: [ x  ]ND/NT, Soft,   [   ] other:    : [ x  ] NO FRANCES CATHETER, [   ] FRANCES CATHETER present [ x ] other: PureWick  MSK: [ x ] No joint swelling, Full ROM   [   ] other:                                         Ext: [x ]No C/C/E, No calf tenderness,   [ ]other:    Skin: [   ]intact,   [ x  ] other: incision along L sternal area c/d/i.     Neurological Examination:  Cognitive: [  x  ] AAO x 3,   [  ]  other:     Attention:  [ x  ] intact   [    ]  other:             Concentration: [ x  ] intact   [    ]  other:   Language: [  ] intact  [ x ] Able to name 3 objects and describe function          Memory: [   ] intact,    [ x ]  other: Impaired, able to recall 1/3 objects   Mood/Affect: [   ] wnl  [  x  ]  other: emotional labile (crying)                                                                          Communication: [ x  ]Fluent, no dysarthria [ ] other:   CN II - XII:  [    ] intact,  [  x  ] other: horizontal  nystagmus noted  with fixation                                                                                        Motor:   RUE: 5/5: shoulder abduction, elbow flexion, elbow extension,  finger flexion  LUE: 4+/5: shoulder abduction, 4+/5 elbow flexion, elbow extension, 4+/5 finger flexion  RLE: 5/5 hip flexion, knee ext, knee flex, ankle dorsiflexion, and ankle  plantarflexion  LLE: 4+/5: hip flexion, knee ext, knee flex, ankle dorsiflexion, and ankle  plantarflexion    Tone: [  x ] wnl,   [    ]  other:  DTRs: [ x ]symmetric, [   ] other:  Coordination:   [    ] intact,   [ x ] other: +FTN, and heel to shin on the L, + L dysdiadokinesia / dysmetria.                                                                     Sensory: [ x  ] Intact to light touch,   [  ] other:    No clonus; No spasticity    MEDICATIONS  (STANDING):  amLODIPine   Tablet 10 milliGRAM(s) Oral daily  aspirin enteric coated 81 milliGRAM(s) Oral daily  atorvastatin 80 milliGRAM(s) Oral at bedtime  carvedilol 6.25 milliGRAM(s) Oral every 12 hours  chlorhexidine 2% Cloths 1 Application(s) Topical <User Schedule>  clopidogrel Tablet 75 milliGRAM(s) Oral daily  dextrose 5% + sodium chloride 0.45%. 1000 milliLiter(s) (75 mL/Hr) IV Continuous <Continuous>  enoxaparin Injectable 40 milliGRAM(s) SubCutaneous every 24 hours  famotidine    Tablet 20 milliGRAM(s) Oral two times a day  hydrochlorothiazide 12.5 milliGRAM(s) Oral daily  lisinopril 5 milliGRAM(s) Oral at bedtime  VOLTAREN  GEL  1%  TOPICAL OINTMENT 1 Application(s) 1 Application(s) Topical three times a day    MEDICATIONS  (PRN):  acetaminophen     Tablet .. 650 milliGRAM(s) Oral every 6 hours PRN Temp greater or equal to 38C (100.4F), Mild Pain (1 - 3)  melatonin 3 milliGRAM(s) Oral at bedtime PRN Insomnia  traMADol 25 milliGRAM(s) Oral every 6 hours PRN severe pain shoulders      RECENT LABS/IMAGING                           12.3   6.80  )-----------( 222      ( 19 Feb 2024 07:02 )             35.5     02-19    137  |  103  |  13  ----------------------------<  95  4.1   |  23  |  0.7    Ca    9.9      19 Feb 2024 07:02  Mg     1.9     02-19    TPro  5.9<L>  /  Alb  3.6  /  TBili  0.6  /  DBili  x   /  AST  27  /  ALT  43<H>  /  AlkPhos  80  02-19      Urinalysis Basic - ( 19 Feb 2024 07:02 )    Color: x / Appearance: x / SG: x / pH: x  Gluc: 95 mg/dL / Ketone: x  / Bili: x / Urobili: x   Blood: x / Protein: x / Nitrite: x   Leuk Esterase: x / RBC: x / WBC x   Sq Epi: x / Non Sq Epi: x / Bacteria: x

## 2024-02-22 NOTE — PROGRESS NOTE ADULT - ATTENDING COMMENTS
Patient seen and examined with the resident. We discussed the case. I have directed the care. I edited the note. The patient requires acute rehab with 3 hours of daily therapies at least 5 out of 7 days and close physiatry follow up.  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 Normal EF  NIHSS+2= Ataxia in 2 limbs on admission to rehab  - S/p Loaded Aspirin 325mg and Plavix 300mg  - C/w Aspirin 81mg and Plavix 75mg daily for 90 days  - HOB at 45 degrees, aspiration precautions  - PT/OT/ST/ neuropsych eval  - ILP placed 2/20    #HTN  Home meds: Carvedilol 20 mg extended release PO daily, HCTZ 25 mg PO daily, Amlodipine 10 mg PO daily  - SBP goal: 110-150  - c/w HCTZ 12.5 mg once daily  - c/w carvedilol 6.25 mg q12hrs  - c/w amlodipine 10 mg once daily   -c/w lisinopril 5 mg at bedtime   -Will monitor and adjust accordingly     #HLD  - c/w lipitor 80 mg daily    #SARS-Cov-2 positive 2/9/2024  -No fever, no WBC count  -Isolation precautions  -Monitor   -re-test on 2/16 was positive, remains on isolation  - 10 days post positive PCR and asymptomatic Can come off Airborne Isolation

## 2024-02-22 NOTE — PROGRESS NOTE ADULT - ASSESSMENT
Assessment:  This is a 82 y/o right-handed F with PMHx of HLD, HTN, L knee OA presenting for worsening unsteady gait. In the ED, vitals were significant for /72. CTH notable for L cerebellar subacute infarct. 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 NIHSS on admission was 3 for ataxia L sided and LLE. Patient was admitted to stroke unit for further work up. On 2/6, the patient's Bp was elevated requiring 15mg of hydralazine overnight. CTH was repeated for possible worsening dysmetria, end gaze nystagmus, and NIHSS increase from 0 to 2 (for upper and lower left extremity dysmetria), urgent CTH showed no intracranial hemorrhage or midline shift. Left cerebellar hypodense lesion consistent with evolving subacute infarct.    The patient was evaluated by the PM&R team once medically stable. The patient was found to have functional limitation in terms of muscle strength, endurance, physical mobility, and ability to carry out activities of daily living (self care, transfers, and ambulation). The patient was started on a course of bedside therapy, the pt is motivated and able to start 3 hours of therapy  daily for 6-7 days a week. With PT/OT, pt required Min A w transfers, Mod A w UB dressing, Mod A w LB dressing. Amb 15' using RW requiring Mod A.  SLP evaluated the patient and recommended regular with thin liquids.The patient was deemed to be a good candidate for admission for acute inpatient rehab. The patient was admitted to acute inpatient rehab on 2/7/24.     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 Normal EF  NIHSS+2= Ataxia in 2 limbs on admission to rehab  - S/p Loaded Aspirin 325mg and Plavix 300mg  - C/w Aspirin 81mg and Plavix 75mg daily for 90 days  - HOB at 45 degrees, aspiration precautions  - PT/OT/ST/ neuropsych eval  - ILP placed 2/20    #HTN  Home meds: Carvedilol 20 mg extended release PO daily, HCTZ 25 mg PO daily, Amlodipine 10 mg PO daily  - SBP goal: 110-150  - c/w HCTZ 12.5 mg once daily  - c/w carvedilol 6.25 mg q12hrs  - c/w amlodipine 10 mg once daily   -c/w lisinopril 5 mg at bedtime   -Will monitor and adjust accordingly     #HLD  - c/w lipitor 80 mg daily    #SARS-Cov-2 positive 2/9/2024  -No fever, no WBC count  -Isolation precautions  -Monitor   -re-test on 2/16 was positive, remains on isolation  - 10 days post positive PCR and asymptomatic Can come off Airborne Isolation    #Diarrhea (2/11)-resolved  -3 loose stool episodes in AM 2/11/2024  -bowel regimen held  - no recent complaint    #Bilat knee OA/ left knee chronic pain  - no NSAIDS for now  - Tylenol PRN for pain  - Modalities   - consider Voltaren cream and/ or lidoderm patch and Tylenol    #GI/Bowel Mgmt:/ Constipation  - finally had BM today with relief  - Miralax, senna (held 2/2 diarrhea)     - Diet: DASH/TLC    Precautions / PROPHYLAXIS:      - Falls      - DVT prophylaxis: Lovenox

## 2024-02-23 RX ORDER — AMLODIPINE BESYLATE 2.5 MG/1
10 TABLET ORAL AT BEDTIME
Refills: 0 | Status: DISCONTINUED | OUTPATIENT
Start: 2024-02-23 | End: 2024-03-02

## 2024-02-23 RX ADMIN — ATORVASTATIN CALCIUM 80 MILLIGRAM(S): 80 TABLET, FILM COATED ORAL at 22:03

## 2024-02-23 RX ADMIN — Medication 81 MILLIGRAM(S): at 12:11

## 2024-02-23 RX ADMIN — CHLORHEXIDINE GLUCONATE 1 APPLICATION(S): 213 SOLUTION TOPICAL at 06:04

## 2024-02-23 RX ADMIN — FAMOTIDINE 20 MILLIGRAM(S): 10 INJECTION INTRAVENOUS at 06:05

## 2024-02-23 RX ADMIN — ENOXAPARIN SODIUM 40 MILLIGRAM(S): 100 INJECTION SUBCUTANEOUS at 22:03

## 2024-02-23 RX ADMIN — CARVEDILOL PHOSPHATE 6.25 MILLIGRAM(S): 80 CAPSULE, EXTENDED RELEASE ORAL at 18:29

## 2024-02-23 RX ADMIN — FAMOTIDINE 20 MILLIGRAM(S): 10 INJECTION INTRAVENOUS at 18:30

## 2024-02-23 RX ADMIN — LISINOPRIL 5 MILLIGRAM(S): 2.5 TABLET ORAL at 22:03

## 2024-02-23 RX ADMIN — CLOPIDOGREL BISULFATE 75 MILLIGRAM(S): 75 TABLET, FILM COATED ORAL at 12:11

## 2024-02-23 RX ADMIN — AMLODIPINE BESYLATE 10 MILLIGRAM(S): 2.5 TABLET ORAL at 22:03

## 2024-02-23 NOTE — PROGRESS NOTE ADULT - ATTENDING COMMENTS
Patient seen and examined with the resident. We discussed the case. I have directed the care. I edited the note. The patient requires acute rehab with 3 hours of daily therapies at least 5 out of 7 days and close physiatry follow up.  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 Normal EF  NIHSS+2= Ataxia in 2 limbs on admission to rehab  - S/p Loaded Aspirin 325mg and Plavix 300mg  - C/w Aspirin 81mg and Plavix 75mg daily for 90 days  - HOB at 45 degrees, aspiration precautions  - PT/OT/ST/ neuropsych eval  - ILP placed 2/20    #HTN  Home meds: Carvedilol 20 mg extended release PO daily, HCTZ 25 mg PO daily, Amlodipine 10 mg PO daily  - SBP goal: 110-150  - c/w HCTZ 12.5 mg once daily  - c/w carvedilol 6.25 mg q12hrs  - c/w amlodipine 10 mg once daily   -c/w lisinopril 5 mg at bedtime   -Will monitor and adjust accordingly     #HLD  - c/w lipitor 80 mg daily    #SARS-Cov-2 positive 2/9/2024  -No fever, no WBC count  -Isolation precautions  -Monitor   -re-test on 2/16 was positive, remains on isolation  - 10 days post positive PCR and asymptomatic Can come off Airborne Isolation    #Diarrhea (2/11)-resolved  -3 loose stool episodes in AM 2/11/2024  -bowel regimen held  - no recent complaint    #Bilat knee OA/ left knee chronic pain  - no NSAIDS for now  - Tylenol PRN for pain  - Modalities   - consider Voltaren cream and/ or lidoderm patch and Tylenol    #GI/Bowel Mgmt:/ Constipation  - finally had BM today with relief  - Miralax, senna (held 2/2 diarrhea)

## 2024-02-23 NOTE — PROGRESS NOTE ADULT - ASSESSMENT
Assessment:  This is a 84 y/o right-handed F with PMHx of HLD, HTN, L knee OA presenting for worsening unsteady gait. In the ED, vitals were significant for /72. CTH notable for L cerebellar subacute infarct. 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 NIHSS on admission was 3 for ataxia L sided and LLE. Patient was admitted to stroke unit for further work up. On 2/6, the patient's Bp was elevated requiring 15mg of hydralazine overnight. CTH was repeated for possible worsening dysmetria, end gaze nystagmus, and NIHSS increase from 0 to 2 (for upper and lower left extremity dysmetria), urgent CTH showed no intracranial hemorrhage or midline shift. Left cerebellar hypodense lesion consistent with evolving subacute infarct.    The patient was evaluated by the PM&R team once medically stable. The patient was found to have functional limitation in terms of muscle strength, endurance, physical mobility, and ability to carry out activities of daily living (self care, transfers, and ambulation). The patient was started on a course of bedside therapy, the pt is motivated and able to start 3 hours of therapy  daily for 6-7 days a week. With PT/OT, pt required Min A w transfers, Mod A w UB dressing, Mod A w LB dressing. Amb 15' using RW requiring Mod A.  SLP evaluated the patient and recommended regular with thin liquids.The patient was deemed to be a good candidate for admission for acute inpatient rehab. The patient was admitted to acute inpatient rehab on 2/7/24.     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 Normal EF  NIHSS+2= Ataxia in 2 limbs on admission to rehab  - S/p Loaded Aspirin 325mg and Plavix 300mg  - C/w Aspirin 81mg and Plavix 75mg daily for 90 days  - HOB at 45 degrees, aspiration precautions  - PT/OT/ST/ neuropsych eval  - ILP placed 2/20    #HTN  Home meds: Carvedilol 20 mg extended release PO daily, HCTZ 25 mg PO daily, Amlodipine 10 mg PO daily  - SBP goal: 110-150  - c/w HCTZ 12.5 mg once daily  - c/w carvedilol 6.25 mg q12hrs  - c/w amlodipine 10 mg once daily   -c/w lisinopril 5 mg at bedtime   -Will monitor and adjust accordingly     #HLD  - c/w lipitor 80 mg daily    #SARS-Cov-2 positive 2/9/2024  -No fever, no WBC count  -Isolation precautions  -Monitor   -re-test on 2/16 was positive, remains on isolation  - 10 days post positive PCR and asymptomatic Can come off Airborne Isolation    #Diarrhea (2/11)-resolved  -3 loose stool episodes in AM 2/11/2024  -bowel regimen held  - no recent complaint    #Bilat knee OA/ left knee chronic pain  - no NSAIDS for now  - Tylenol PRN for pain  - Modalities   - consider Voltaren cream and/ or lidoderm patch and Tylenol    #GI/Bowel Mgmt:/ Constipation  - finally had BM today with relief  - Miralax, senna (held 2/2 diarrhea)     - Diet: DASH/TLC    Precautions / PROPHYLAXIS:      - Falls      - DVT prophylaxis: Lovenox

## 2024-02-23 NOTE — PROGRESS NOTE ADULT - SUBJECTIVE AND OBJECTIVE BOX
Patient is an 83 y old female who presents with a chief complaint of Rehab of left cerebellar stroke with left hemiataxia.(nondominant) (07 Feb 2024 14:25)    HPI:  This is a 84 y/o RHD F with PMHx of HLD, HTN, L knee OA presenting for unsteady gait.    Patient states she was in her normal state of health a day prior to presentation. She was outside having lunch with her friend when she started to notice she was off balance that persists after coming back home. For worsening unsteady gait and imbalance she decided  to seek medical help the next day. She admits to vomiting, but denies any HA, diplopia, N, dizziness, numbness/tingling, speech difficulties. In the ED, vitals were significant for /72. CTH notable for L cerebellar subacute infarct. 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 NIHSS on admission was 3. Patient was admitted to stroke unit for further work up.     On 2/6/2204, the patient's Bp was elevated requiring 15mg of hydralazine overnight. CTH was repeated for possible worsening dysmetria, end gaze nystagmus, and NIHSS increase from 0 to 2 (for upper and lower left extremity dysmetria), urgent CTH showed no intracranial hemorrhage or midline shift. Left cerebellar hypodense lesion consistent with evolving subacute infarct.    The patient was evaluated by the PM&R team once medically stable. The patient was found to have functional limitation in terms of muscle strength, endurance, physical mobility, and ability to carry out activities of daily living (self care, transfers, and ambulation). The patient was started on a course of bedside therapy, the pt is motivated and able to start 3 hours of therapy  daily for 6-7 days a week. With PT/OT,  pt required Min A w transfers, Mod A w UB dressing, Mod A w LB dressing. Amb 15' using RW requiring Mod A. SLP evaluated the patient and recommended regular with thin liquids. The patient was deemed to be a good candidate for admission for acute inpatient rehab. The patient was admitted to acute inpatient rehab on 2/7/24.    (07 Feb 2024 14:25)      TODAY'S SUBJECTIVE & REVIEW OF SYMPTOMS:    Patient was seen and examined at bedside this morning. No acute overnight events.   No new complaints offered this AM except for mild soreness around the area of the ILR. No redness noted, dressing c/d/i  Patient in good spirits and participating/tolerating therapies well and making functional gains  Voiding spontaneously and has regular BMs.     Vitals reviewed and stable.  CLOF: SA for transfers, Mod A for UB dressing, Mod A for LB dressing. Ambulates 50ft with RW requiring SA.    Review of Systems:   Constitutional:    [x ] WNL           [   ] poor appetite   [   ] insomnia   [  ] tired   Cardio:                [ x ] WNL           [   ] CP   [  ] BARBOSA   [   ] palpitations               Resp:                   [ x  ] WNL           [   ] SOB   [  ] cough  [   ] wheezing   GI:                        [ x  ] WNL           [   ] constipation  [   ] diarrhea   [   ] abdominal pain   [   ] nausea   [   ] emesis                                :                      [ x  ] WNL           [   ] FRANCES  [   ] dysuria   [   ] difficulty voiding   []: Other:    Endo:                   [ x  ] WNL          [   ] polyuria   [   ] temperature intolerance                 Skin:                     [ x  ] WNL          [   ] pain   [   ] wound  [   ] rash   MSK:                    [  ] WNL          [  ] muscle pain   [ x ] joint pain/ stiffness-b/l knee pain L>R -stable  [   ] muscle tenderness   [   ] swelling   Neuro:                 [   ] WNL          [   ] HA   [   ] change in vision   [   ] tremor   [x  ] LSW weakness  [   ]dysphagia            Cognitive:           [ x ] WNL           [  ]confusion      Psych:                  [  x ] WNL           [   ] hallucinations   [   ]agitation   [   ] delusion   [   ]depression    PHYSICAL EXAM    Vital Signs Last 24 Hrs  T(C): 36.4 (23 Feb 2024 05:38), Max: 36.4 (23 Feb 2024 05:38)  T(F): 97.5 (23 Feb 2024 05:38), Max: 97.5 (23 Feb 2024 05:38)  HR: 60 (23 Feb 2024 05:38) (60 - 91)  BP: 115/71 (23 Feb 2024 05:38) (115/71 - 141/63)  BP(mean): 84 (23 Feb 2024 05:38) (84 - 90)  RR: 18 (23 Feb 2024 05:38) (18 - 18)    General:[ x  ] NAD, Resting Comfortable   [   ] other:                                HEENT: [ x  ] NC/AT, EOMI,  Normal Conjunctivae,   [   ] other:   Cardio: [  x ] RRR, no murmur,   [   ] other:                              Pulm: [ x  ] No Respiratory Distress,  Lungs CTAB,   [   ] other:             Abdomen: [ x  ]ND/NT, Soft,   [   ] other:    : [ x  ] NO FRANCES CATHETER, [   ] FRANCES CATHETER present [ x ] other: PureWick  MSK: [ x ] No joint swelling, Full ROM   [   ] other:                                         Ext: [x ]No C/C/E, No calf tenderness,   [ ]other:    Skin: [   ]intact,   [ x  ] other: incision along L sternal area c/d/i.     Neurological Examination:  Cognitive: [  x  ] AAO x 3,   [  ]  other:     Attention:  [ x  ] intact   [    ]  other:             Concentration: [ x  ] intact   [    ]  other:   Language: [  ] intact  [ x ] Able to name 3 objects and describe function          Memory: [   ] intact,    [ x ]  other: Impaired, able to recall 1/3 objects   Mood/Affect: [   ] wnl  [  x  ]  other: emotional labile (crying)                                                                          Communication: [ x  ]Fluent, no dysarthria [ ] other:   CN II - XII:  [    ] intact,  [  x  ] other: horizontal  nystagmus noted  with fixation                                                                                        Motor:   RUE: 5/5: shoulder abduction, elbow flexion, elbow extension,  finger flexion  LUE: 4+/5: shoulder abduction, 4+/5 elbow flexion, elbow extension, 4+/5 finger flexion  RLE: 5/5 hip flexion, knee ext, knee flex, ankle dorsiflexion, and ankle  plantarflexion  LLE: 4+/5: hip flexion, knee ext, knee flex, ankle dorsiflexion, and ankle  plantarflexion    Tone: [  x ] wnl,   [    ]  other:  DTRs: [ x ]symmetric, [   ] other:  Coordination:   [    ] intact,   [ x ] other: +FTN, and heel to shin on the L, + L dysdiadokinesia / dysmetria.                                                                     Sensory: [ x  ] Intact to light touch,   [  ] other:    No clonus; No spasticity    MEDICATIONS  (STANDING):  amLODIPine   Tablet 10 milliGRAM(s) Oral daily  aspirin enteric coated 81 milliGRAM(s) Oral daily  atorvastatin 80 milliGRAM(s) Oral at bedtime  carvedilol 6.25 milliGRAM(s) Oral every 12 hours  chlorhexidine 2% Cloths 1 Application(s) Topical <User Schedule>  clopidogrel Tablet 75 milliGRAM(s) Oral daily  dextrose 5% + sodium chloride 0.45%. 1000 milliLiter(s) (75 mL/Hr) IV Continuous <Continuous>  enoxaparin Injectable 40 milliGRAM(s) SubCutaneous every 24 hours  famotidine    Tablet 20 milliGRAM(s) Oral two times a day  hydrochlorothiazide 12.5 milliGRAM(s) Oral daily  lisinopril 5 milliGRAM(s) Oral at bedtime  VOLTAREN  GEL  1%  TOPICAL OINTMENT 1 Application(s) 1 Application(s) Topical three times a day    MEDICATIONS  (PRN):  acetaminophen     Tablet .. 650 milliGRAM(s) Oral every 6 hours PRN Temp greater or equal to 38C (100.4F), Mild Pain (1 - 3)  melatonin 3 milliGRAM(s) Oral at bedtime PRN Insomnia  traMADol 25 milliGRAM(s) Oral every 6 hours PRN severe pain shoulders        RECENT LABS/IMAGING                           12.3   6.80  )-----------( 222      ( 19 Feb 2024 07:02 )             35.5     02-19    137  |  103  |  13  ----------------------------<  95  4.1   |  23  |  0.7    Ca    9.9      19 Feb 2024 07:02  Mg     1.9     02-19    TPro  5.9<L>  /  Alb  3.6  /  TBili  0.6  /  DBili  x   /  AST  27  /  ALT  43<H>  /  AlkPhos  80  02-19      Urinalysis Basic - ( 19 Feb 2024 07:02 )    Color: x / Appearance: x / SG: x / pH: x  Gluc: 95 mg/dL / Ketone: x  / Bili: x / Urobili: x   Blood: x / Protein: x / Nitrite: x   Leuk Esterase: x / RBC: x / WBC x   Sq Epi: x / Non Sq Epi: x / Bacteria: x

## 2024-02-24 RX ORDER — SENNA PLUS 8.6 MG/1
2 TABLET ORAL AT BEDTIME
Refills: 0 | Status: DISCONTINUED | OUTPATIENT
Start: 2024-02-24 | End: 2024-02-25

## 2024-02-24 RX ORDER — POLYETHYLENE GLYCOL 3350 17 G/17G
17 POWDER, FOR SOLUTION ORAL DAILY
Refills: 0 | Status: DISCONTINUED | OUTPATIENT
Start: 2024-02-24 | End: 2024-02-25

## 2024-02-24 RX ADMIN — CLOPIDOGREL BISULFATE 75 MILLIGRAM(S): 75 TABLET, FILM COATED ORAL at 13:03

## 2024-02-24 RX ADMIN — CARVEDILOL PHOSPHATE 6.25 MILLIGRAM(S): 80 CAPSULE, EXTENDED RELEASE ORAL at 17:31

## 2024-02-24 RX ADMIN — CARVEDILOL PHOSPHATE 6.25 MILLIGRAM(S): 80 CAPSULE, EXTENDED RELEASE ORAL at 05:48

## 2024-02-24 RX ADMIN — Medication 81 MILLIGRAM(S): at 13:03

## 2024-02-24 RX ADMIN — FAMOTIDINE 20 MILLIGRAM(S): 10 INJECTION INTRAVENOUS at 17:31

## 2024-02-24 RX ADMIN — FAMOTIDINE 20 MILLIGRAM(S): 10 INJECTION INTRAVENOUS at 05:48

## 2024-02-24 RX ADMIN — SENNA PLUS 2 TABLET(S): 8.6 TABLET ORAL at 21:51

## 2024-02-24 RX ADMIN — Medication 5 MILLIGRAM(S): at 17:31

## 2024-02-24 RX ADMIN — CHLORHEXIDINE GLUCONATE 1 APPLICATION(S): 213 SOLUTION TOPICAL at 05:47

## 2024-02-24 RX ADMIN — ATORVASTATIN CALCIUM 80 MILLIGRAM(S): 80 TABLET, FILM COATED ORAL at 21:51

## 2024-02-24 RX ADMIN — POLYETHYLENE GLYCOL 3350 17 GRAM(S): 17 POWDER, FOR SOLUTION ORAL at 13:03

## 2024-02-24 RX ADMIN — LISINOPRIL 5 MILLIGRAM(S): 2.5 TABLET ORAL at 21:51

## 2024-02-24 RX ADMIN — AMLODIPINE BESYLATE 10 MILLIGRAM(S): 2.5 TABLET ORAL at 21:51

## 2024-02-24 RX ADMIN — ENOXAPARIN SODIUM 40 MILLIGRAM(S): 100 INJECTION SUBCUTANEOUS at 21:51

## 2024-02-24 NOTE — PROGRESS NOTE ADULT - ATTENDING COMMENTS
Patient seen and examined with the rehab resident. We discussed the case. I have directed the care. I edited the note. The patient requires acute rehab with 3 hours of daily therapies at least 5 out of 7 days and close physiatry follow up.    Assessment:  This is an 82 y/o right-handed F with PMHx of HLD, HTN, L knee OA presenting for worsening unsteady gait. In the ED, vitals were significant for /72. CTH notable for L cerebellar subacute infarct. 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 NIHSS on admission was 3 for ataxia L sided and LLE. Patient was admitted to stroke unit for further work up. On 2/6, the patient's Bp was elevated requiring 15mg of hydralazine overnight. CTH was repeated for possible worsening dysmetria, end gaze nystagmus, and NIHSS increase from 0 to 2 (for upper and lower left extremity dysmetria), urgent CTH showed no intracranial hemorrhage or midline shift. Left cerebellar hypodense lesion consistent with evolving subacute infarct.    The patient was evaluated by the PM&R team once medically stable. The patient was found to have functional limitation in terms of muscle strength, endurance, physical mobility, and ability to carry out activities of daily living (self care, transfers, and ambulation). The patient was started on a course of bedside therapy, the pt is motivated and able to start 3 hours of therapy  daily for 6-7 days a week. With PT/OT, pt required Min A w transfers, Mod A w UB dressing, Mod A w LB dressing. Amb 15' using RW requiring Mod A.  SLP evaluated the patient and recommended regular with thin liquids.The patient was deemed to be a good candidate for admission for acute inpatient rehab. The patient was admitted to acute inpatient rehab on 2/7/2024.     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 Normal EF  NIHSS+2= Ataxia in 2 limbs on admission to rehab  - S/p Loaded Aspirin 325mg and Plavix 300mg  - C/w Aspirin 81 mg and Plavix 75 mg daily for 90 days  - HOB at 45 degrees, aspiration precautions  - PT/OT/ST/ neuropsych eval  - ILP placed 2/20    #HTN  Home meds: carvedilol 20 mg extended release PO daily, HCTZ 25 mg PO daily, Amlodipine 10 mg PO daily  - SBP goal: 110-150  - c/w HCTZ 12.5 mg once daily  - c/w carvedilol 6.25 mg q12hrs  - c/w amlodipine 10 mg once at bedtime  -c/w lisinopril 5 mg at bedtime   -Will monitor and adjust accordingly     #HLD  - c/w lipitor 80 mg daily    #SARS-Cov-2 positive 2/9/2024  -No fever, no WBC count  -Isolation precautions  -Monitor   -re-test on 2/16 was positive, remains on isolation  - 10 days post positive PCR and asymptomatic, come off Airborne Isolation    #Diarrhea (2/11)-resolved  -3 loose stool episodes in AM 2/11/2024  -bowel regimen held  - no recent complaint    #Bilat knee OA/ left knee chronic pain  - no NSAIDS for now  - Tylenol PRN for pain  - Modalities   - consider Voltaren cream and/ or Lidoderm patch and Tylenol    #GI/Bowel Mgmt:/ Constipation  - finally had BM today with relief  - Miralax, senna (held 2/2 diarrhea)     - Diet: DASH/TLC    Precautions / PROPHYLAXIS:      - Falls      - DVT prophylaxis: Lovenox

## 2024-02-24 NOTE — PROGRESS NOTE ADULT - ASSESSMENT
Assessment:  This is a 82 y/o right-handed F with PMHx of HLD, HTN, L knee OA presenting for worsening unsteady gait. In the ED, vitals were significant for /72. CTH notable for L cerebellar subacute infarct. 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 NIHSS on admission was 3 for ataxia L sided and LLE. Patient was admitted to stroke unit for further work up. On 2/6, the patient's Bp was elevated requiring 15mg of hydralazine overnight. CTH was repeated for possible worsening dysmetria, end gaze nystagmus, and NIHSS increase from 0 to 2 (for upper and lower left extremity dysmetria), urgent CTH showed no intracranial hemorrhage or midline shift. Left cerebellar hypodense lesion consistent with evolving subacute infarct.    The patient was evaluated by the PM&R team once medically stable. The patient was found to have functional limitation in terms of muscle strength, endurance, physical mobility, and ability to carry out activities of daily living (self care, transfers, and ambulation). The patient was started on a course of bedside therapy, the pt is motivated and able to start 3 hours of therapy  daily for 6-7 days a week. With PT/OT, pt required Min A w transfers, Mod A w UB dressing, Mod A w LB dressing. Amb 15' using RW requiring Mod A.  SLP evaluated the patient and recommended regular with thin liquids.The patient was deemed to be a good candidate for admission for acute inpatient rehab. The patient was admitted to acute inpatient rehab on 2/7/24.     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 Normal EF  NIHSS+2= Ataxia in 2 limbs on admission to rehab  - S/p Loaded Aspirin 325mg and Plavix 300mg  - C/w Aspirin 81mg and Plavix 75mg daily for 90 days  - HOB at 45 degrees, aspiration precautions  - PT/OT/ST/ neuropsych eval  - ILP placed 2/20    #HTN  Home meds: Carvedilol 20 mg extended release PO daily, HCTZ 25 mg PO daily, Amlodipine 10 mg PO daily  - SBP goal: 110-150  - c/w HCTZ 12.5 mg once daily  - c/w carvedilol 6.25 mg q12hrs  - c/w amlodipine 10 mg once at bedtime  -c/w lisinopril 5 mg at bedtime   -Will monitor and adjust accordingly     #HLD  - c/w lipitor 80 mg daily    #SARS-Cov-2 positive 2/9/2024  -No fever, no WBC count  -Isolation precautions  -Monitor   -re-test on 2/16 was positive, remains on isolation  - 10 days post positive PCR and asymptomatic, come off Airborne Isolation    #Diarrhea (2/11)-resolved  -3 loose stool episodes in AM 2/11/2024  -bowel regimen held  - no recent complaint    #Bilat knee OA/ left knee chronic pain  - no NSAIDS for now  - Tylenol PRN for pain  - Modalities   - consider Voltaren cream and/ or lidoderm patch and Tylenol    #GI/Bowel Mgmt:/ Constipation  - finally had BM today with relief  - Miralax, senna (held 2/2 diarrhea)     - Diet: DASH/TLC    Precautions / PROPHYLAXIS:      - Falls      - DVT prophylaxis: Lovenox        Assessment:  This is an 82 y/o right-handed F with PMHx of HLD, HTN, L knee OA presenting for worsening unsteady gait. In the ED, vitals were significant for /72. CTH notable for L cerebellar subacute infarct. 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 NIHSS on admission was 3 for ataxia L sided and LLE. Patient was admitted to stroke unit for further work up. On 2/6, the patient's Bp was elevated requiring 15mg of hydralazine overnight. CTH was repeated for possible worsening dysmetria, end gaze nystagmus, and NIHSS increase from 0 to 2 (for upper and lower left extremity dysmetria), urgent CTH showed no intracranial hemorrhage or midline shift. Left cerebellar hypodense lesion consistent with evolving subacute infarct.    The patient was evaluated by the PM&R team once medically stable. The patient was found to have functional limitation in terms of muscle strength, endurance, physical mobility, and ability to carry out activities of daily living (self care, transfers, and ambulation). The patient was started on a course of bedside therapy, the pt is motivated and able to start 3 hours of therapy  daily for 6-7 days a week. With PT/OT, pt required Min A w transfers, Mod A w UB dressing, Mod A w LB dressing. Amb 15' using RW requiring Mod A.  SLP evaluated the patient and recommended regular with thin liquids.The patient was deemed to be a good candidate for admission for acute inpatient rehab. The patient was admitted to acute inpatient rehab on 2/7/2024.     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 Normal EF  NIHSS+2= Ataxia in 2 limbs on admission to rehab  - S/p Loaded Aspirin 325mg and Plavix 300mg  - C/w Aspirin 81 mg and Plavix 75 mg daily for 90 days  - HOB at 45 degrees, aspiration precautions  - PT/OT/ST/ neuropsych eval  - ILP placed 2/20    #HTN  Home meds: carvedilol 20 mg extended release PO daily, HCTZ 25 mg PO daily, Amlodipine 10 mg PO daily  - SBP goal: 110-150  - c/w HCTZ 12.5 mg once daily  - c/w carvedilol 6.25 mg q12hrs  - c/w amlodipine 10 mg once at bedtime  -c/w lisinopril 5 mg at bedtime   -Will monitor and adjust accordingly     #HLD  - c/w lipitor 80 mg daily    #SARS-Cov-2 positive 2/9/2024  -No fever, no WBC count  -Isolation precautions  -Monitor   -re-test on 2/16 was positive, remains on isolation  - 10 days post positive PCR and asymptomatic, come off Airborne Isolation    #Diarrhea (2/11)-resolved  -3 loose stool episodes in AM 2/11/2024  -bowel regimen held  - no recent complaint    #Bilat knee OA/ left knee chronic pain  - no NSAIDS for now  - Tylenol PRN for pain  - Modalities   - consider Voltaren cream and/ or Lidoderm patch and Tylenol    #GI/Bowel Mgmt:/ Constipation  - finally had BM today with relief  - Miralax, senna (held 2/2 diarrhea)     - Diet: DASH/TLC    Precautions / PROPHYLAXIS:      - Falls      - DVT prophylaxis: Lovenox

## 2024-02-24 NOTE — PROGRESS NOTE ADULT - SUBJECTIVE AND OBJECTIVE BOX
Patient is an 83 y old female who presents with a chief complaint of Rehab of left cerebellar stroke with left hemiataxia.(nondominant) (07 Feb 2024 14:25)    HPI:  This is a 84 y/o RHD F with PMHx of HLD, HTN, L knee OA presenting for unsteady gait.    Patient states she was in her normal state of health a day prior to presentation. She was outside having lunch with her friend when she started to notice she was off balance that persists after coming back home. For worsening unsteady gait and imbalance she decided  to seek medical help the next day. She admits to vomiting, but denies any HA, diplopia, N, dizziness, numbness/tingling, speech difficulties. In the ED, vitals were significant for /72. CTH notable for L cerebellar subacute infarct. 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 NIHSS on admission was 3. Patient was admitted to stroke unit for further work up.     On 2/6/2204, the patient's Bp was elevated requiring 15mg of hydralazine overnight. CTH was repeated for possible worsening dysmetria, end gaze nystagmus, and NIHSS increase from 0 to 2 (for upper and lower left extremity dysmetria), urgent CTH showed no intracranial hemorrhage or midline shift. Left cerebellar hypodense lesion consistent with evolving subacute infarct.    The patient was evaluated by the PM&R team once medically stable. The patient was found to have functional limitation in terms of muscle strength, endurance, physical mobility, and ability to carry out activities of daily living (self care, transfers, and ambulation). The patient was started on a course of bedside therapy, the pt is motivated and able to start 3 hours of therapy  daily for 6-7 days a week. With PT/OT,  pt required Min A w transfers, Mod A w UB dressing, Mod A w LB dressing. Amb 15' using RW requiring Mod A. SLP evaluated the patient and recommended regular with thin liquids. The patient was deemed to be a good candidate for admission for acute inpatient rehab. The patient was admitted to acute inpatient rehab on 2/7/24.    (07 Feb 2024 14:25)      TODAY'S SUBJECTIVE & REVIEW OF SYMPTOMS:    Patient was seen and examined at bedside this morning. No acute overnight events.   No new complaints offered this AM  Patient in good spirits and participating/tolerating therapies well and making functional gains  Voiding spontaneously and has a BM 2 days ago and requested a bowel regimen.     Vitals reviewed and stable.  CLOF: SA for transfers, Mod A for UB dressing, Mod A for LB dressing. Ambulates 50ft with RW requiring SA.    Review of Systems:   Constitutional:    [x ] WNL           [   ] poor appetite   [   ] insomnia   [  ] tired   Cardio:                [ x ] WNL           [   ] CP   [  ] BARBOSA   [   ] palpitations               Resp:                   [ x  ] WNL           [   ] SOB   [  ] cough  [   ] wheezing   GI:                        [   ] WNL           [ x  ] constipation  [   ] diarrhea   [   ] abdominal pain   [   ] nausea   [   ] emesis                                :                      [ x  ] WNL           [   ] FRANCES  [   ] dysuria   [   ] difficulty voiding   []: Other:    Endo:                   [ x  ] WNL          [   ] polyuria   [   ] temperature intolerance                 Skin:                     [ x  ] WNL          [   ] pain   [   ] wound  [   ] rash   MSK:                    [  ] WNL          [  ] muscle pain   [ x ] joint pain/ stiffness-b/l knee pain L>R -stable  [   ] muscle tenderness   [   ] swelling   Neuro:                 [   ] WNL          [   ] HA   [   ] change in vision   [   ] tremor   [x  ] LSW weakness  [   ]dysphagia            Cognitive:           [ x ] WNL           [  ]confusion      Psych:                  [  x ] WNL           [   ] hallucinations   [   ]agitation   [   ] delusion   [   ]depression    PHYSICAL EXAM    Vital Signs Last 24 Hrs  T(C): 36.7 (24 Feb 2024 05:00), Max: 36.7 (24 Feb 2024 05:00)  T(F): 98 (24 Feb 2024 05:00), Max: 98 (24 Feb 2024 05:00)  HR: 58 (24 Feb 2024 05:00) (58 - 74)  BP: 126/56 (24 Feb 2024 05:00) (126/56 - 165/70)  BP(mean): 100 (23 Feb 2024 21:35) (100 - 100)  RR: 18 (24 Feb 2024 05:00) (18 - 20)    General:[ x  ] NAD, Resting Comfortable   [   ] other:                                HEENT: [ x  ] NC/AT, EOMI,  Normal Conjunctivae,   [   ] other:   Cardio: [  x ] RRR, no murmur,   [   ] other:                              Pulm: [ x  ] No Respiratory Distress,  Lungs CTAB,   [   ] other:             Abdomen: [ x  ]ND/NT, Soft,   [   ] other:    : [ x  ] NO FRANCES CATHETER, [   ] FRANCES CATHETER present [ x ] other: PureWick  MSK: [ x ] No joint swelling, Full ROM   [   ] other:                                         Ext: [x ]No C/C/E, No calf tenderness,   [ ]other:    Skin: [   ]intact,   [ x  ] other: incision along L sternal area c/d/i.     Neurological Examination:  Cognitive: [  x  ] AAO x 3,   [  ]  other:     Attention:  [ x  ] intact   [    ]  other:             Concentration: [ x  ] intact   [    ]  other:   Language: [  ] intact  [ x ] Able to name 3 objects and describe function          Memory: [   ] intact,    [ x ]  other: Impaired, able to recall 1/3 objects   Mood/Affect: [   ] wnl  [  x  ]  other: emotional labile (crying)                                                                          Communication: [ x  ]Fluent, no dysarthria [ ] other:   CN II - XII:  [    ] intact,  [  x  ] other: horizontal  nystagmus noted  with fixation                                                                                        Motor:   RUE: 5/5: shoulder abduction, elbow flexion, elbow extension,  finger flexion  LUE: 4+/5: shoulder abduction, 4+/5 elbow flexion, elbow extension, 4+/5 finger flexion  RLE: 5/5 hip flexion, knee ext, knee flex, ankle dorsiflexion, and ankle  plantarflexion  LLE: 4+/5: hip flexion, knee ext, knee flex, ankle dorsiflexion, and ankle  plantarflexion    Tone: [  x ] wnl,   [    ]  other:  DTRs: [ x ]symmetric, [   ] other:  Coordination:   [    ] intact,   [ x ] other: +FTN, and heel to shin on the L, + L dysdiadokinesia / dysmetria.                                                                     Sensory: [ x  ] Intact to light touch,   [  ] other:    No clonus; No spasticity    MEDICATIONS  (STANDING):  amLODIPine   Tablet 10 milliGRAM(s) Oral at bedtime  aspirin enteric coated 81 milliGRAM(s) Oral daily  atorvastatin 80 milliGRAM(s) Oral at bedtime  bisacodyl 5 milliGRAM(s) Oral once  carvedilol 6.25 milliGRAM(s) Oral every 12 hours  chlorhexidine 2% Cloths 1 Application(s) Topical <User Schedule>  clopidogrel Tablet 75 milliGRAM(s) Oral daily  dextrose 5% + sodium chloride 0.45%. 1000 milliLiter(s) (75 mL/Hr) IV Continuous <Continuous>  enoxaparin Injectable 40 milliGRAM(s) SubCutaneous every 24 hours  famotidine    Tablet 20 milliGRAM(s) Oral two times a day  hydrochlorothiazide 12.5 milliGRAM(s) Oral daily  lisinopril 5 milliGRAM(s) Oral at bedtime  polyethylene glycol 3350 17 Gram(s) Oral daily  VOLTAREN  GEL  1%  TOPICAL OINTMENT 1 Application(s) 1 Application(s) Topical three times a day    MEDICATIONS  (PRN):  acetaminophen     Tablet .. 650 milliGRAM(s) Oral every 6 hours PRN Temp greater or equal to 38C (100.4F), Mild Pain (1 - 3)  melatonin 3 milliGRAM(s) Oral at bedtime PRN Insomnia  senna 2 Tablet(s) Oral at bedtime PRN Constipation  traMADol 25 milliGRAM(s) Oral every 6 hours PRN severe pain shoulders        RECENT LABS/IMAGING                           12.3   6.80  )-----------( 222      ( 19 Feb 2024 07:02 )             35.5     02-19    137  |  103  |  13  ----------------------------<  95  4.1   |  23  |  0.7    Ca    9.9      19 Feb 2024 07:02  Mg     1.9     02-19    TPro  5.9<L>  /  Alb  3.6  /  TBili  0.6  /  DBili  x   /  AST  27  /  ALT  43<H>  /  AlkPhos  80  02-19      Urinalysis Basic - ( 19 Feb 2024 07:02 )    Color: x / Appearance: x / SG: x / pH: x  Gluc: 95 mg/dL / Ketone: x  / Bili: x / Urobili: x   Blood: x / Protein: x / Nitrite: x   Leuk Esterase: x / RBC: x / WBC x   Sq Epi: x / Non Sq Epi: x / Bacteria: x                 Patient is an 83 y. old female who presents with a chief complaint of Rehab of left cerebellar stroke with left hemiataxia.(nondominant) (07 Feb 2024 14:25)    HPI:  This is a 84 y/o RHD F with PMHx of HLD, HTN, L knee OA presenting for unsteady gait.    Patient states she was in her normal state of health a day prior to presentation. She was outside having lunch with her friend when she started to notice she was off balance that persists after coming back home. For worsening unsteady gait and imbalance she decided  to seek medical help the next day. She admits to vomiting, but denies any HA, diplopia, N, dizziness, numbness/tingling, speech difficulties. In the ED, vitals were significant for /72. CTH notable for L cerebellar subacute infarct. 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 NIHSS on admission was 3. Patient was admitted to stroke unit for further work up.     On 2/6/2204, the patient's BP was elevated requiring 15mg of hydralazine overnight. CTH was repeated for possible worsening dysmetria, end gaze nystagmus, and NIHSS increase from 0 to 2 (for upper and lower left extremity dysmetria), urgent CTH showed no intracranial hemorrhage or midline shift. Left cerebellar hypodense lesion consistent with evolving subacute infarct.    The patient was evaluated by the PM&R team once medically stable. The patient was found to have functional limitation in terms of muscle strength, endurance, physical mobility, and ability to carry out activities of daily living (self care, transfers, and ambulation). The patient was started on a course of bedside therapy, the pt is motivated and able to start 3 hours of therapy  daily for 6-7 days a week. With PT/OT,  pt required Min A w transfers, Mod A w UB dressing, Mod A w LB dressing. Amb 15' using RW requiring Mod A. SLP evaluated the patient and recommended regular with thin liquids. The patient was deemed to be a good candidate for admission for acute inpatient rehab. The patient was admitted to acute inpatient rehab on 2/7/24.    (07 Feb 2024 14:25)      TODAY'S SUBJECTIVE & REVIEW OF SYMPTOMS:    Patient was seen and examined at bedside this morning. No acute overnight events.   No new complaints offered this AM  Patient in good spirits and participating/tolerating therapies well and making functional gains  Voiding spontaneously and has a BM 2 days ago and requested a bowel regimen.     Vitals reviewed and stable.  CLOF: SA for transfers, Mod A for UB dressing, Mod A for LB dressing. Ambulates 50ft with RW requiring SA.    Review of Systems:   Constitutional:    [x ] WNL           [   ] poor appetite   [   ] insomnia   [  ] tired   Cardio:                [ x ] WNL           [   ] CP   [  ] BARBOSA   [   ] palpitations               Resp:                   [ x  ] WNL           [   ] SOB   [  ] cough  [   ] wheezing   GI:                        [   ] WNL           [ x  ] constipation  [   ] diarrhea   [   ] abdominal pain   [   ] nausea   [   ] emesis                                :                      [ x  ] WNL           [   ] FRANCES  [   ] dysuria   [   ] difficulty voiding   []: Other:    Endo:                   [ x  ] WNL          [   ] polyuria   [   ] temperature intolerance                 Skin:                     [ x  ] WNL          [   ] pain   [   ] wound  [   ] rash   MSK:                    [  ] WNL          [  ] muscle pain   [ x ] joint pain/ stiffness-b/l knee pain L>R -stable  [   ] muscle tenderness   [   ] swelling   Neuro:                 [   ] WNL          [   ] HA   [   ] change in vision   [   ] tremor   [x  ] LSW weakness  [   ]dysphagia            Cognitive:           [ x ] WNL           [  ]confusion      Psych:                  [  x ] WNL           [   ] hallucinations   [   ]agitation   [   ] delusion   [   ]depression    PHYSICAL EXAM    Vital Signs Last 24 Hrs  T(C): 36.7 (24 Feb 2024 05:00), Max: 36.7 (24 Feb 2024 05:00)  T(F): 98 (24 Feb 2024 05:00), Max: 98 (24 Feb 2024 05:00)  HR: 58 (24 Feb 2024 05:00) (58 - 74)  BP: 126/56 (24 Feb 2024 05:00) (126/56 - 165/70)  BP(mean): 100 (23 Feb 2024 21:35) (100 - 100)  RR: 18 (24 Feb 2024 05:00) (18 - 20)    General:[ x  ] NAD, Resting Comfortable   [   ] other:                                HEENT: [ x  ] NC/AT, EOMI,  Normal Conjunctivae,   [   ] other:   Cardio: [  x ] RRR, no murmur,   [   ] other:                              Pulm: [ x  ] No Respiratory Distress,  Lungs CTAB,   [   ] other:             Abdomen: [ x  ]ND/NT, Soft,   [   ] other:    : [ x  ] NO FRANCES CATHETER, [   ] FRANCES CATHETER present [ x ] other: PureWick  MSK: [ x ] No joint swelling, Full ROM   [   ] other:                                         Ext: [x ]No C/C/E, No calf tenderness,   [ ]other:    Skin: [   ]intact,   [ x  ] other: incision along L sternal area c/d/i.     Neurological Examination:  Cognitive: [  x  ] AAO x 3,   [  ]  other:     Attention:  [ x  ] intact   [    ]  other:             Concentration: [ x  ] intact   [    ]  other:   Language: [  ] intact  [ x ] Able to name 3 objects and describe function          Memory: [   ] intact,    [ x ]  other: Impaired, able to recall 1/3 objects   Mood/Affect: [   ] wnl  [  x  ]  other: emotional labile (crying)                                                                          Communication: [ x  ]Fluent, no dysarthria [ ] other:   CN II - XII:  [    ] intact,  [  x  ] other: horizontal  nystagmus noted  with fixation                                                                                        Motor:   RUE: 5/5: shoulder abduction, elbow flexion, elbow extension,  finger flexion  LUE: 4+/5: shoulder abduction, 4+/5 elbow flexion, elbow extension, 4+/5 finger flexion  RLE: 5/5 hip flexion, knee ext, knee flex, ankle dorsiflexion, and ankle  plantarflexion  LLE: 4+/5: hip flexion, knee ext, knee flex, ankle dorsiflexion, and ankle  plantarflexion    Tone: [  x ] wnl,   [    ]  other:  DTRs: [ x ]symmetric, [   ] other:  Coordination:   [    ] intact,   [ x ] other: +FTN, and heel to shin on the L, + L dysdiadokinesia / dysmetria.                                                                     Sensory: [ x  ] Intact to light touch,   [  ] other:    No clonus; No spasticity    MEDICATIONS  (STANDING):  amLODIPine   Tablet 10 milliGRAM(s) Oral at bedtime  aspirin enteric coated 81 milliGRAM(s) Oral daily  atorvastatin 80 milliGRAM(s) Oral at bedtime  bisacodyl 5 milliGRAM(s) Oral once  carvedilol 6.25 milliGRAM(s) Oral every 12 hours  chlorhexidine 2% Cloths 1 Application(s) Topical <User Schedule>  clopidogrel Tablet 75 milliGRAM(s) Oral daily  dextrose 5% + sodium chloride 0.45%. 1000 milliLiter(s) (75 mL/Hr) IV Continuous <Continuous>  enoxaparin Injectable 40 milliGRAM(s) SubCutaneous every 24 hours  famotidine    Tablet 20 milliGRAM(s) Oral two times a day  hydrochlorothiazide 12.5 milliGRAM(s) Oral daily  lisinopril 5 milliGRAM(s) Oral at bedtime  polyethylene glycol 3350 17 Gram(s) Oral daily  VOLTAREN  GEL  1%  TOPICAL OINTMENT 1 Application(s) 1 Application(s) Topical three times a day    MEDICATIONS  (PRN):  acetaminophen     Tablet .. 650 milliGRAM(s) Oral every 6 hours PRN Temp greater or equal to 38C (100.4F), Mild Pain (1 - 3)  melatonin 3 milliGRAM(s) Oral at bedtime PRN Insomnia  senna 2 Tablet(s) Oral at bedtime PRN Constipation  traMADol 25 milliGRAM(s) Oral every 6 hours PRN severe pain shoulders        RECENT LABS/IMAGING                           12.3   6.80  )-----------( 222      ( 19 Feb 2024 07:02 )             35.5     02-19    137  |  103  |  13  ----------------------------<  95  4.1   |  23  |  0.7    Ca    9.9      19 Feb 2024 07:02  Mg     1.9     02-19    TPro  5.9<L>  /  Alb  3.6  /  TBili  0.6  /  DBili  x   /  AST  27  /  ALT  43<H>  /  AlkPhos  80  02-19      Urinalysis Basic - ( 19 Feb 2024 07:02 )    Color: x / Appearance: x / SG: x / pH: x  Gluc: 95 mg/dL / Ketone: x  / Bili: x / Urobili: x   Blood: x / Protein: x / Nitrite: x   Leuk Esterase: x / RBC: x / WBC x   Sq Epi: x / Non Sq Epi: x / Bacteria: x

## 2024-02-25 RX ADMIN — FAMOTIDINE 20 MILLIGRAM(S): 10 INJECTION INTRAVENOUS at 18:01

## 2024-02-25 RX ADMIN — AMLODIPINE BESYLATE 10 MILLIGRAM(S): 2.5 TABLET ORAL at 21:09

## 2024-02-25 RX ADMIN — FAMOTIDINE 20 MILLIGRAM(S): 10 INJECTION INTRAVENOUS at 05:21

## 2024-02-25 RX ADMIN — Medication 81 MILLIGRAM(S): at 13:38

## 2024-02-25 RX ADMIN — CHLORHEXIDINE GLUCONATE 1 APPLICATION(S): 213 SOLUTION TOPICAL at 05:23

## 2024-02-25 RX ADMIN — CLOPIDOGREL BISULFATE 75 MILLIGRAM(S): 75 TABLET, FILM COATED ORAL at 13:37

## 2024-02-25 RX ADMIN — CARVEDILOL PHOSPHATE 6.25 MILLIGRAM(S): 80 CAPSULE, EXTENDED RELEASE ORAL at 05:21

## 2024-02-25 RX ADMIN — ENOXAPARIN SODIUM 40 MILLIGRAM(S): 100 INJECTION SUBCUTANEOUS at 23:21

## 2024-02-25 RX ADMIN — ATORVASTATIN CALCIUM 80 MILLIGRAM(S): 80 TABLET, FILM COATED ORAL at 21:09

## 2024-02-25 RX ADMIN — CARVEDILOL PHOSPHATE 6.25 MILLIGRAM(S): 80 CAPSULE, EXTENDED RELEASE ORAL at 18:01

## 2024-02-25 NOTE — PROGRESS NOTE ADULT - SUBJECTIVE AND OBJECTIVE BOX
Patient is an 83 y. old female who presents with a chief complaint of Rehab of left cerebellar stroke with left hemiataxia.(nondominant) (07 Feb 2024 14:25)    HPI:  This is a 82 y/o RHD F with PMHx of HLD, HTN, L knee OA presenting for unsteady gait.    Patient states she was in her normal state of health a day prior to presentation. She was outside having lunch with her friend when she started to notice she was off balance that persists after coming back home. For worsening unsteady gait and imbalance she decided  to seek medical help the next day. She admits to vomiting, but denies any HA, diplopia, N, dizziness, numbness/tingling, speech difficulties. In the ED, vitals were significant for /72. CTH notable for L cerebellar subacute infarct. 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 NIHSS on admission was 3. Patient was admitted to stroke unit for further work up.     On 2/6/2204, the patient's BP was elevated requiring 15mg of hydralazine overnight. CTH was repeated for possible worsening dysmetria, end gaze nystagmus, and NIHSS increase from 0 to 2 (for upper and lower left extremity dysmetria), urgent CTH showed no intracranial hemorrhage or midline shift. Left cerebellar hypodense lesion consistent with evolving subacute infarct.    The patient was evaluated by the PM&R team once medically stable. The patient was found to have functional limitation in terms of muscle strength, endurance, physical mobility, and ability to carry out activities of daily living (self care, transfers, and ambulation). The patient was started on a course of bedside therapy, the pt is motivated and able to start 3 hours of therapy  daily for 6-7 days a week. With PT/OT,  pt required Min A w transfers, Mod A w UB dressing, Mod A w LB dressing. Amb 15' using RW requiring Mod A. SLP evaluated the patient and recommended regular with thin liquids. The patient was deemed to be a good candidate for admission for acute inpatient rehab. The patient was admitted to acute inpatient rehab on 2/7/24.    (07 Feb 2024 14:25)      TODAY'S SUBJECTIVE & REVIEW OF SYMPTOMS:    Patient was seen and examined at bedside this morning. No acute overnight events. Continues to have loose BMs with most recent one this AM. Will hold bowel regimen. Patient in good spirits and participating/tolerating therapies well and making functional gains. Voiding spontaneously. Vitals reviewed and stable.    CLOF: SA for transfers, Mod A for UB dressing, Mod A for LB dressing. Ambulates 50ft with RW requiring SA.    Review of Systems:   Constitutional:    [x ] WNL           [   ] poor appetite   [   ] insomnia   [  ] tired   Cardio:                [ x ] WNL           [   ] CP   [  ] BABROSA   [   ] palpitations               Resp:                   [ x  ] WNL           [   ] SOB   [  ] cough  [   ] wheezing   GI:                        [   ] WNL           [ x  ] constipation  [   ] diarrhea   [   ] abdominal pain   [   ] nausea   [   ] emesis                                :                      [ x  ] WNL           [   ] FRANCES  [   ] dysuria   [   ] difficulty voiding   []: Other:    Endo:                   [ x  ] WNL          [   ] polyuria   [   ] temperature intolerance                 Skin:                     [ x  ] WNL          [   ] pain   [   ] wound  [   ] rash   MSK:                    [  ] WNL          [  ] muscle pain   [ x ] joint pain/ stiffness-b/l knee pain L>R -stable  [   ] muscle tenderness   [   ] swelling   Neuro:                 [   ] WNL          [   ] HA   [   ] change in vision   [   ] tremor   [x  ] LSW weakness  [   ]dysphagia            Cognitive:           [ x ] WNL           [  ]confusion      Psych:                  [  x ] WNL           [   ] hallucinations   [   ]agitation   [   ] delusion   [   ]depression    PHYSICAL EXAM    Vital Signs Last 24 Hrs  T(C): 36.5 (25 Feb 2024 13:05), Max: 36.5 (24 Feb 2024 20:30)  T(F): 97.7 (25 Feb 2024 13:05), Max: 97.7 (24 Feb 2024 20:30)  HR: 67 (25 Feb 2024 13:05) (64 - 69)  BP: 101/69 (25 Feb 2024 13:05) (101/69 - 143/64)  BP(mean): --  RR: 18 (25 Feb 2024 13:05) (18 - 18)  SpO2: --    General:[ x  ] NAD, Resting Comfortable   [   ] other:                                HEENT: [ x  ] NC/AT, EOMI,  Normal Conjunctivae,   [   ] other:   Cardio: [  x ] RRR, no murmur,   [   ] other:                              Pulm: [ x  ] No Respiratory Distress,  Lungs CTAB,   [   ] other:             Abdomen: [ x  ]ND/NT, Soft,   [   ] other:    : [ x  ] NO FRANCES CATHETER, [   ] FRANCES CATHETER present [ x ] other: PureWick  MSK: [ x ] No joint swelling, Full ROM   [   ] other:                                         Ext: [x ]No C/C/E, No calf tenderness,   [ ]other:    Skin: [   ]intact,   [ x  ] other: incision along L sternal area c/d/i.     Neurological Examination:  Cognitive: [  x  ] AAO x 3,   [  ]  other:     Attention:  [ x  ] intact   [    ]  other:             Concentration: [ x  ] intact   [    ]  other:   Language: [  ] intact  [ x ] Able to name 3 objects and describe function          Memory: [   ] intact,    [ x ]  other: Impaired, able to recall 1/3 objects   Mood/Affect: [   ] wnl  [  x  ]  other: emotional labile (crying)                                                                          Communication: [ x  ]Fluent, no dysarthria [ ] other:   CN II - XII:  [    ] intact,  [  x  ] other: horizontal  nystagmus noted  with fixation                                                                                        Motor:   RUE: 5/5: shoulder abduction, elbow flexion, elbow extension,  finger flexion  LUE: 4+/5: shoulder abduction, 4+/5 elbow flexion, elbow extension, 4+/5 finger flexion  RLE: 5/5 hip flexion, knee ext, knee flex, ankle dorsiflexion, and ankle  plantarflexion  LLE: 4+/5: hip flexion, knee ext, knee flex, ankle dorsiflexion, and ankle  plantarflexion    Tone: [  x ] wnl,   [    ]  other:  DTRs: [ x ]symmetric, [   ] other:  Coordination:   [    ] intact,   [ x ] other: +FTN, and heel to shin on the L, + L dysdiadokinesia / dysmetria.                                                                     Sensory: [ x  ] Intact to light touch,   [  ] other:    No clonus; No spasticity    MEDICATIONS  (STANDING):  amLODIPine   Tablet 10 milliGRAM(s) Oral at bedtime  aspirin enteric coated 81 milliGRAM(s) Oral daily  atorvastatin 80 milliGRAM(s) Oral at bedtime  carvedilol 6.25 milliGRAM(s) Oral every 12 hours  chlorhexidine 2% Cloths 1 Application(s) Topical <User Schedule>  clopidogrel Tablet 75 milliGRAM(s) Oral daily  dextrose 5% + sodium chloride 0.45%. 1000 milliLiter(s) (75 mL/Hr) IV Continuous <Continuous>  enoxaparin Injectable 40 milliGRAM(s) SubCutaneous every 24 hours  famotidine    Tablet 20 milliGRAM(s) Oral two times a day  hydrochlorothiazide 12.5 milliGRAM(s) Oral daily  lisinopril 5 milliGRAM(s) Oral at bedtime  VOLTAREN  GEL  1%  TOPICAL OINTMENT 1 Application(s) 1 Application(s) Topical three times a day    MEDICATIONS  (PRN):  acetaminophen     Tablet .. 650 milliGRAM(s) Oral every 6 hours PRN Temp greater or equal to 38C (100.4F), Mild Pain (1 - 3)  melatonin 3 milliGRAM(s) Oral at bedtime PRN Insomnia  traMADol 25 milliGRAM(s) Oral every 6 hours PRN severe pain shoulders      RECENT LABS/IMAGING                           12.3   6.80  )-----------( 222      ( 19 Feb 2024 07:02 )             35.5     02-19    137  |  103  |  13  ----------------------------<  95  4.1   |  23  |  0.7    Ca    9.9      19 Feb 2024 07:02  Mg     1.9     02-19    TPro  5.9<L>  /  Alb  3.6  /  TBili  0.6  /  DBili  x   /  AST  27  /  ALT  43<H>  /  AlkPhos  80  02-19      Urinalysis Basic - ( 19 Feb 2024 07:02 )    Color: x / Appearance: x / SG: x / pH: x  Gluc: 95 mg/dL / Ketone: x  / Bili: x / Urobili: x   Blood: x / Protein: x / Nitrite: x   Leuk Esterase: x / RBC: x / WBC x   Sq Epi: x / Non Sq Epi: x / Bacteria: x                 Patient is an 83 y. old female who presents with a chief complaint of Rehab of left cerebellar stroke with left hemiataxia.(nondominant) (07 Feb 2024 14:25)    HPI:  This is an 82 y/o RHD F with PMHx of HLD, HTN, L knee OA presenting for unsteady gait.    Patient states she was in her normal state of health a day prior to presentation. She was outside having lunch with her friend when she started to notice she was off balance that persists after coming back home. For worsening unsteady gait and imbalance she decided  to seek medical help the next day. She admits to vomiting, but denies any HA, diplopia, N, dizziness, numbness/tingling, speech difficulties. In the ED, vitals were significant for /72. CTH notable for L cerebellar subacute infarct. 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 NIHSS on admission was 3. Patient was admitted to stroke unit for further work up.     On 2/6/2204, the patient's BP was elevated requiring 15mg of hydralazine overnight. CTH was repeated for possible worsening dysmetria, end gaze nystagmus, and NIHSS increase from 0 to 2 (for upper and lower left extremity dysmetria), urgent CTH showed no intracranial hemorrhage or midline shift. Left cerebellar hypodense lesion consistent with evolving subacute infarct.    The patient was evaluated by the PM&R team once medically stable. The patient was found to have functional limitation in terms of muscle strength, endurance, physical mobility, and ability to carry out activities of daily living (self care, transfers, and ambulation). The patient was started on a course of bedside therapy, the pt is motivated and able to start 3 hours of therapy  daily for 6-7 days a week. With PT/OT,  pt required Min A w transfers, Mod A w UB dressing, Mod A w LB dressing. Amb 15' using RW requiring Mod A. SLP evaluated the patient and recommended regular with thin liquids. The patient was deemed to be a good candidate for admission for acute inpatient rehab. The patient was admitted to acute inpatient rehab on 2/7/24.    (07 Feb 2024 14:25)      TODAY'S SUBJECTIVE & REVIEW OF SYMPTOMS:    Patient was seen and examined at bedside this morning. No acute overnight events. Continues to have loose BMs with most recent one this AM. Will hold bowel regimen. Patient in good spirits and participating/tolerating therapies well and making functional gains. Voiding spontaneously. Vitals reviewed and stable.    CLOF: SA for transfers, Mod A for UB dressing, Mod A for LB dressing. Ambulates 50ft with RW requiring SA.    Review of Systems:   Constitutional:    [x ] WNL           [   ] poor appetite   [   ] insomnia   [  ] tired   Cardio:                [ x ] WNL           [   ] CP   [  ] BARBOSA   [   ] palpitations               Resp:                   [ x  ] WNL           [   ] SOB   [  ] cough  [   ] wheezing   GI:                        [   ] WNL           [ x  ] constipation  [   ] diarrhea   [   ] abdominal pain   [   ] nausea   [   ] emesis                                :                      [ x  ] WNL           [   ] FRANCES  [   ] dysuria   [   ] difficulty voiding   []: Other:    Endo:                   [ x  ] WNL          [   ] polyuria   [   ] temperature intolerance                 Skin:                     [ x  ] WNL          [   ] pain   [   ] wound  [   ] rash   MSK:                    [  ] WNL          [  ] muscle pain   [ x ] joint pain/ stiffness-b/l knee pain L>R -stable  [   ] muscle tenderness   [   ] swelling   Neuro:                 [   ] WNL          [   ] HA   [   ] change in vision   [   ] tremor   [x  ] LSW weakness  [   ]dysphagia            Cognitive:           [ x ] WNL           [  ]confusion      Psych:                  [  x ] WNL           [   ] hallucinations   [   ]agitation   [   ] delusion   [   ]depression    PHYSICAL EXAM    Vital Signs Last 24 Hrs  T(C): 36.5 (25 Feb 2024 13:05), Max: 36.5 (24 Feb 2024 20:30)  T(F): 97.7 (25 Feb 2024 13:05), Max: 97.7 (24 Feb 2024 20:30)  HR: 67 (25 Feb 2024 13:05) (64 - 69)  BP: 101/69 (25 Feb 2024 13:05) (101/69 - 143/64)  BP(mean): --  RR: 18 (25 Feb 2024 13:05) (18 - 18)  SpO2: --    General:[ x  ] NAD, Resting Comfortable   [   ] other:                                HEENT: [ x  ] NC/AT, EOMI,  Normal Conjunctivae,   [   ] other:   Cardio: [  x ] RRR, no murmur,   [   ] other:                              Pulm: [ x  ] No Respiratory Distress,  Lungs CTAB,   [   ] other:             Abdomen: [ x  ]ND/NT, Soft,   [   ] other:    : [ x  ] NO FRANCES CATHETER, [   ] FRANCES CATHETER present [ x ] other: PureWick  MSK: [ x ] No joint swelling, Full ROM   [   ] other:                                         Ext: [x ]No C/C/E, No calf tenderness,   [ ]other:    Skin: [   ]intact,   [ x  ] other: incision along L sternal area c/d/i.     Neurological Examination:  Cognitive: [  x  ] AAO x 3,   [  ]  other:     Attention:  [ x  ] intact   [    ]  other:             Concentration: [ x  ] intact   [    ]  other:   Language: [  ] intact  [ x ] Able to name 3 objects and describe function          Memory: [   ] intact,    [ x ]  other: Impaired, able to recall 1/3 objects   Mood/Affect: [   ] wnl  [  x  ]  other: emotional labile (crying)                                                                          Communication: [ x  ]Fluent, no dysarthria [ ] other:   CN II - XII:  [    ] intact,  [  x  ] other: horizontal  nystagmus noted  with fixation                                                                                        Motor:   RUE: 5/5: shoulder abduction, elbow flexion, elbow extension,  finger flexion  LUE: 4+/5: shoulder abduction, 4+/5 elbow flexion, elbow extension, 4+/5 finger flexion  RLE: 5/5 hip flexion, knee ext, knee flex, ankle dorsiflexion, and ankle  plantarflexion  LLE: 4+/5: hip flexion, knee ext, knee flex, ankle dorsiflexion, and ankle  plantarflexion    Tone: [  x ] wnl,   [    ]  other:  DTRs: [ x ]symmetric, [   ] other:  Coordination:   [    ] intact,   [ x ] other: +FTN, and heel to shin on the L, + L dysdiadokinesia / dysmetria.                                                                     Sensory: [ x  ] Intact to light touch,   [  ] other:    No clonus; No spasticity    MEDICATIONS  (STANDING):  amLODIPine   Tablet 10 milliGRAM(s) Oral at bedtime  aspirin enteric coated 81 milliGRAM(s) Oral daily  atorvastatin 80 milliGRAM(s) Oral at bedtime  carvedilol 6.25 milliGRAM(s) Oral every 12 hours  chlorhexidine 2% Cloths 1 Application(s) Topical <User Schedule>  clopidogrel Tablet 75 milliGRAM(s) Oral daily  dextrose 5% + sodium chloride 0.45%. 1000 milliLiter(s) (75 mL/Hr) IV Continuous <Continuous>  enoxaparin Injectable 40 milliGRAM(s) SubCutaneous every 24 hours  famotidine    Tablet 20 milliGRAM(s) Oral two times a day  hydrochlorothiazide 12.5 milliGRAM(s) Oral daily  lisinopril 5 milliGRAM(s) Oral at bedtime  VOLTAREN  GEL  1%  TOPICAL OINTMENT 1 Application(s) 1 Application(s) Topical three times a day    MEDICATIONS  (PRN):  acetaminophen     Tablet .. 650 milliGRAM(s) Oral every 6 hours PRN Temp greater or equal to 38C (100.4F), Mild Pain (1 - 3)  melatonin 3 milliGRAM(s) Oral at bedtime PRN Insomnia  traMADol 25 milliGRAM(s) Oral every 6 hours PRN severe pain shoulders      RECENT LABS/IMAGING                           12.3   6.80  )-----------( 222      ( 19 Feb 2024 07:02 )             35.5     02-19    137  |  103  |  13  ----------------------------<  95  4.1   |  23  |  0.7    Ca    9.9      19 Feb 2024 07:02  Mg     1.9     02-19    TPro  5.9<L>  /  Alb  3.6  /  TBili  0.6  /  DBili  x   /  AST  27  /  ALT  43<H>  /  AlkPhos  80  02-19      Urinalysis Basic - ( 19 Feb 2024 07:02 )    Color: x / Appearance: x / SG: x / pH: x  Gluc: 95 mg/dL / Ketone: x  / Bili: x / Urobili: x   Blood: x / Protein: x / Nitrite: x   Leuk Esterase: x / RBC: x / WBC x   Sq Epi: x / Non Sq Epi: x / Bacteria: x

## 2024-02-25 NOTE — PROGRESS NOTE ADULT - ATTENDING COMMENTS
Patient seen and examined with the rehab resident. We discussed the case. I have directed the care. I edited the note. The patient requires acute rehab with 3 hours of daily therapies at least 5 out of 7 days and close physiatry follow up.    Assessment:  This is an 84 y/o right-handed F with PMHx of HLD, HTN, L knee OA presenting for worsening unsteady gait. In the ED, vitals were significant for /72. CTH notable for L cerebellar subacute infarct. 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 NIHSS on admission was 3 for ataxia L sided and LLE. Patient was admitted to stroke unit for further work up. On 2/6/2024, the patient's BP was elevated requiring 15mg of hydralazine overnight. CTH was repeated for possible worsening dysmetria, end gaze nystagmus, and NIHSS increase from 0 to 2 (for upper and lower left extremity dysmetria), urgent CTH showed no intracranial hemorrhage or midline shift. Left cerebellar hypodense lesion consistent with evolving subacute infarct.    The patient was evaluated by the PM&R team once medically stable. The patient was found to have functional limitation in terms of muscle strength, endurance, physical mobility, and ability to carry out activities of daily living (self care, transfers, and ambulation). The patient was started on a course of bedside therapy, the pt is motivated and able to start 3 hours of therapy  daily for 6-7 days a week. With PT/OT, pt required Min A w transfers, Mod A w UB dressing, Mod A w LB dressing. Amb 15' using RW requiring Mod A.  SLP evaluated the patient and recommended regular with thin liquids.The patient was deemed to be a good candidate for admission for acute inpatient rehab. The patient was admitted to acute inpatient rehab on 2/7/2024.     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 Normal EF  NIHSS+2= Ataxia in 2 limbs on admission to rehab  - S/p Loaded Aspirin 325mg and Plavix 300mg  - C/w Aspirin 81 mg and Plavix 75 mg daily for 90 days  - HOB at 45 degrees, aspiration precautions  - PT/OT/ST/ neuropsych eval  - ILP placed 2/20    #HTN  Home meds: carvedilol 20 mg extended release PO daily, HCTZ 25 mg PO daily, Amlodipine 10 mg PO daily  - SBP goal: 110-150  - c/w HCTZ 12.5 mg once daily  - c/w carvedilol 6.25 mg q12hrs  - c/w amlodipine 10 mg once at bedtime  -c/w lisinopril 5 mg at bedtime   -Will monitor and adjust accordingly     #HLD  - c/w Lipitor 80 mg daily    #SARS-Cov-2 positive 2/9/2024  -No fever, no WBC count  -Isolation precautions  -Monitor   -re-test on 2/16 was positive, remains on isolation  - 10 days post positive PCR and asymptomatic, come off Airborne Isolation    #Diarrhea -intermittent  -3 loose stool episodes in AM 2/11/2024  -bowel regimen held  - no recent complaint    #Bilat knee OA/ left knee chronic pain  - no NSAIDS for now  - Tylenol PRN for pain  - Modalities   - consider Voltaren cream and/ or Lidoderm patch and Tylenol    #GI/Bowel Mgmt:/ Constipation  - finally had BM today with relief  - Miralax, senna (held 2/2 diarrhea)     - Diet: DASH/TLC    Precautions / PROPHYLAXIS:      - Falls      - DVT prophylaxis: Lovenox

## 2024-02-25 NOTE — PROGRESS NOTE ADULT - ASSESSMENT
Assessment:  This is an 84 y/o right-handed F with PMHx of HLD, HTN, L knee OA presenting for worsening unsteady gait. In the ED, vitals were significant for /72. CTH notable for L cerebellar subacute infarct. 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 NIHSS on admission was 3 for ataxia L sided and LLE. Patient was admitted to stroke unit for further work up. On 2/6, the patient's Bp was elevated requiring 15mg of hydralazine overnight. CTH was repeated for possible worsening dysmetria, end gaze nystagmus, and NIHSS increase from 0 to 2 (for upper and lower left extremity dysmetria), urgent CTH showed no intracranial hemorrhage or midline shift. Left cerebellar hypodense lesion consistent with evolving subacute infarct.    The patient was evaluated by the PM&R team once medically stable. The patient was found to have functional limitation in terms of muscle strength, endurance, physical mobility, and ability to carry out activities of daily living (self care, transfers, and ambulation). The patient was started on a course of bedside therapy, the pt is motivated and able to start 3 hours of therapy  daily for 6-7 days a week. With PT/OT, pt required Min A w transfers, Mod A w UB dressing, Mod A w LB dressing. Amb 15' using RW requiring Mod A.  SLP evaluated the patient and recommended regular with thin liquids.The patient was deemed to be a good candidate for admission for acute inpatient rehab. The patient was admitted to acute inpatient rehab on 2/7/2024.     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 Normal EF  NIHSS+2= Ataxia in 2 limbs on admission to rehab  - S/p Loaded Aspirin 325mg and Plavix 300mg  - C/w Aspirin 81 mg and Plavix 75 mg daily for 90 days  - HOB at 45 degrees, aspiration precautions  - PT/OT/ST/ neuropsych eval  - ILP placed 2/20    #HTN  Home meds: carvedilol 20 mg extended release PO daily, HCTZ 25 mg PO daily, Amlodipine 10 mg PO daily  - SBP goal: 110-150  - c/w HCTZ 12.5 mg once daily  - c/w carvedilol 6.25 mg q12hrs  - c/w amlodipine 10 mg once at bedtime  -c/w lisinopril 5 mg at bedtime   -Will monitor and adjust accordingly     #HLD  - c/w lipitor 80 mg daily    #SARS-Cov-2 positive 2/9/2024  -No fever, no WBC count  -Isolation precautions  -Monitor   -re-test on 2/16 was positive, remains on isolation  - 10 days post positive PCR and asymptomatic, come off Airborne Isolation    #Diarrhea -intermittent  -3 loose stool episodes in AM 2/11/2024  -bowel regimen held  - no recent complaint    #Bilat knee OA/ left knee chronic pain  - no NSAIDS for now  - Tylenol PRN for pain  - Modalities   - consider Voltaren cream and/ or Lidoderm patch and Tylenol    #GI/Bowel Mgmt:/ Constipation  - finally had BM today with relief  - Miralax, senna (held 2/2 diarrhea)     - Diet: DASH/TLC    Precautions / PROPHYLAXIS:      - Falls      - DVT prophylaxis: Lovenox        Assessment:  This is an 84 y/o right-handed F with PMHx of HLD, HTN, L knee OA presenting for worsening unsteady gait. In the ED, vitals were significant for /72. CTH notable for L cerebellar subacute infarct. 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 NIHSS on admission was 3 for ataxia L sided and LLE. Patient was admitted to stroke unit for further work up. On 2/6/2024, the patient's BP was elevated requiring 15mg of hydralazine overnight. CTH was repeated for possible worsening dysmetria, end gaze nystagmus, and NIHSS increase from 0 to 2 (for upper and lower left extremity dysmetria), urgent CTH showed no intracranial hemorrhage or midline shift. Left cerebellar hypodense lesion consistent with evolving subacute infarct.    The patient was evaluated by the PM&R team once medically stable. The patient was found to have functional limitation in terms of muscle strength, endurance, physical mobility, and ability to carry out activities of daily living (self care, transfers, and ambulation). The patient was started on a course of bedside therapy, the pt is motivated and able to start 3 hours of therapy  daily for 6-7 days a week. With PT/OT, pt required Min A w transfers, Mod A w UB dressing, Mod A w LB dressing. Amb 15' using RW requiring Mod A.  SLP evaluated the patient and recommended regular with thin liquids.The patient was deemed to be a good candidate for admission for acute inpatient rehab. The patient was admitted to acute inpatient rehab on 2/7/2024.     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 Normal EF  NIHSS+2= Ataxia in 2 limbs on admission to rehab  - S/p Loaded Aspirin 325mg and Plavix 300mg  - C/w Aspirin 81 mg and Plavix 75 mg daily for 90 days  - HOB at 45 degrees, aspiration precautions  - PT/OT/ST/ neuropsych eval  - ILP placed 2/20    #HTN  Home meds: carvedilol 20 mg extended release PO daily, HCTZ 25 mg PO daily, Amlodipine 10 mg PO daily  - SBP goal: 110-150  - c/w HCTZ 12.5 mg once daily  - c/w carvedilol 6.25 mg q12hrs  - c/w amlodipine 10 mg once at bedtime  -c/w lisinopril 5 mg at bedtime   -Will monitor and adjust accordingly     #HLD  - c/w Lipitor 80 mg daily    #SARS-Cov-2 positive 2/9/2024  -No fever, no WBC count  -Isolation precautions  -Monitor   -re-test on 2/16 was positive, remains on isolation  - 10 days post positive PCR and asymptomatic, come off Airborne Isolation    #Diarrhea -intermittent  -3 loose stool episodes in AM 2/11/2024  -bowel regimen held  - no recent complaint    #Bilat knee OA/ left knee chronic pain  - no NSAIDS for now  - Tylenol PRN for pain  - Modalities   - consider Voltaren cream and/ or Lidoderm patch and Tylenol    #GI/Bowel Mgmt:/ Constipation  - finally had BM today with relief  - Miralax, senna (held 2/2 diarrhea)     - Diet: DASH/TLC    Precautions / PROPHYLAXIS:      - Falls      - DVT prophylaxis: Lovenox

## 2024-02-26 LAB
ALBUMIN SERPL ELPH-MCNC: 4 G/DL — SIGNIFICANT CHANGE UP (ref 3.5–5.2)
ALP SERPL-CCNC: 77 U/L — SIGNIFICANT CHANGE UP (ref 30–115)
ALT FLD-CCNC: 38 U/L — SIGNIFICANT CHANGE UP (ref 0–41)
ANION GAP SERPL CALC-SCNC: 13 MMOL/L — SIGNIFICANT CHANGE UP (ref 7–14)
AST SERPL-CCNC: 23 U/L — SIGNIFICANT CHANGE UP (ref 0–41)
BASOPHILS # BLD AUTO: 0.06 K/UL — SIGNIFICANT CHANGE UP (ref 0–0.2)
BASOPHILS NFR BLD AUTO: 1 % — SIGNIFICANT CHANGE UP (ref 0–1)
BILIRUB SERPL-MCNC: 0.6 MG/DL — SIGNIFICANT CHANGE UP (ref 0.2–1.2)
BUN SERPL-MCNC: 17 MG/DL — SIGNIFICANT CHANGE UP (ref 10–20)
CALCIUM SERPL-MCNC: 10.4 MG/DL — SIGNIFICANT CHANGE UP (ref 8.4–10.5)
CHLORIDE SERPL-SCNC: 103 MMOL/L — SIGNIFICANT CHANGE UP (ref 98–110)
CO2 SERPL-SCNC: 22 MMOL/L — SIGNIFICANT CHANGE UP (ref 17–32)
CREAT SERPL-MCNC: 0.7 MG/DL — SIGNIFICANT CHANGE UP (ref 0.7–1.5)
EGFR: 86 ML/MIN/1.73M2 — SIGNIFICANT CHANGE UP
EOSINOPHIL # BLD AUTO: 0.15 K/UL — SIGNIFICANT CHANGE UP (ref 0–0.7)
EOSINOPHIL NFR BLD AUTO: 2.5 % — SIGNIFICANT CHANGE UP (ref 0–8)
GLUCOSE SERPL-MCNC: 101 MG/DL — HIGH (ref 70–99)
HCT VFR BLD CALC: 36.1 % — LOW (ref 37–47)
HGB BLD-MCNC: 12.5 G/DL — SIGNIFICANT CHANGE UP (ref 12–16)
IMM GRANULOCYTES NFR BLD AUTO: 0.3 % — SIGNIFICANT CHANGE UP (ref 0.1–0.3)
LYMPHOCYTES # BLD AUTO: 1.99 K/UL — SIGNIFICANT CHANGE UP (ref 1.2–3.4)
LYMPHOCYTES # BLD AUTO: 33.7 % — SIGNIFICANT CHANGE UP (ref 20.5–51.1)
MAGNESIUM SERPL-MCNC: 1.8 MG/DL — SIGNIFICANT CHANGE UP (ref 1.8–2.4)
MCHC RBC-ENTMCNC: 30.6 PG — SIGNIFICANT CHANGE UP (ref 27–31)
MCHC RBC-ENTMCNC: 34.6 G/DL — SIGNIFICANT CHANGE UP (ref 32–37)
MCV RBC AUTO: 88.5 FL — SIGNIFICANT CHANGE UP (ref 81–99)
MONOCYTES # BLD AUTO: 0.64 K/UL — HIGH (ref 0.1–0.6)
MONOCYTES NFR BLD AUTO: 10.8 % — HIGH (ref 1.7–9.3)
NEUTROPHILS # BLD AUTO: 3.04 K/UL — SIGNIFICANT CHANGE UP (ref 1.4–6.5)
NEUTROPHILS NFR BLD AUTO: 51.7 % — SIGNIFICANT CHANGE UP (ref 42.2–75.2)
NRBC # BLD: 0 /100 WBCS — SIGNIFICANT CHANGE UP (ref 0–0)
PLATELET # BLD AUTO: 211 K/UL — SIGNIFICANT CHANGE UP (ref 130–400)
PMV BLD: 11.3 FL — HIGH (ref 7.4–10.4)
POTASSIUM SERPL-MCNC: 3.7 MMOL/L — SIGNIFICANT CHANGE UP (ref 3.5–5)
POTASSIUM SERPL-SCNC: 3.7 MMOL/L — SIGNIFICANT CHANGE UP (ref 3.5–5)
PROT SERPL-MCNC: 6.3 G/DL — SIGNIFICANT CHANGE UP (ref 6–8)
RBC # BLD: 4.08 M/UL — LOW (ref 4.2–5.4)
RBC # FLD: 12.7 % — SIGNIFICANT CHANGE UP (ref 11.5–14.5)
SODIUM SERPL-SCNC: 138 MMOL/L — SIGNIFICANT CHANGE UP (ref 135–146)
WBC # BLD: 5.9 K/UL — SIGNIFICANT CHANGE UP (ref 4.8–10.8)
WBC # FLD AUTO: 5.9 K/UL — SIGNIFICANT CHANGE UP (ref 4.8–10.8)

## 2024-02-26 RX ADMIN — AMLODIPINE BESYLATE 10 MILLIGRAM(S): 2.5 TABLET ORAL at 21:22

## 2024-02-26 RX ADMIN — CARVEDILOL PHOSPHATE 6.25 MILLIGRAM(S): 80 CAPSULE, EXTENDED RELEASE ORAL at 17:54

## 2024-02-26 RX ADMIN — Medication 81 MILLIGRAM(S): at 13:27

## 2024-02-26 RX ADMIN — CARVEDILOL PHOSPHATE 6.25 MILLIGRAM(S): 80 CAPSULE, EXTENDED RELEASE ORAL at 05:41

## 2024-02-26 RX ADMIN — ATORVASTATIN CALCIUM 80 MILLIGRAM(S): 80 TABLET, FILM COATED ORAL at 21:22

## 2024-02-26 RX ADMIN — FAMOTIDINE 20 MILLIGRAM(S): 10 INJECTION INTRAVENOUS at 17:54

## 2024-02-26 RX ADMIN — CLOPIDOGREL BISULFATE 75 MILLIGRAM(S): 75 TABLET, FILM COATED ORAL at 13:28

## 2024-02-26 RX ADMIN — CHLORHEXIDINE GLUCONATE 1 APPLICATION(S): 213 SOLUTION TOPICAL at 05:45

## 2024-02-26 RX ADMIN — ENOXAPARIN SODIUM 40 MILLIGRAM(S): 100 INJECTION SUBCUTANEOUS at 21:21

## 2024-02-26 RX ADMIN — FAMOTIDINE 20 MILLIGRAM(S): 10 INJECTION INTRAVENOUS at 05:42

## 2024-02-26 NOTE — PROGRESS NOTE ADULT - ASSESSMENT
Neuropsychology Follow-Up       Treatment Session Focused on Mood    Pain: Denied Location:  NA Ratin/10     Intervention: NA   Orientation: WFL    Arousal Level: Alert   Behavior: Cooperative, pleasant   Affect Range:  Somewhat labile, dysthymic and tearful at initiation of session, less dysthmic/somewhat euthymic and no longer tearful at end of session    Needed:  No  #: N/A   Attention: WFL    Insight into illness/deficits: Good     This note reflects documentation of the second of two patient contacts for Ms. Behar today (noted entered earlier today reflects a cognitive remediation session conducted prior to contact described in the present note).     Writer met with patient today to assess mood/adjustment given report of care management who described the patient as emotionally upset and tearful following discussion of potential discharge options.  Writer found the patient to be seated in wheelchair at bedside table sitting quietly but tearful.  Mood was assessed; patient described worry and sadness related to possibly discharging to a different care facility.  No imminent risk of self-harm was identified; patient adamantly and convincingly denied any SHIIP.  She demonstrated future-oriented thinking and spontaneously identified several protective factors.  She indicated that she was appreciative for care received from [facility] providers and discussed preference to return to independent living at home as opposed to receiving care elsewhere.  Emotions were identified and processed; support was provided, and discussion of relaxation techniques (i.e., visualization, deep breathing) was provided and techniques were practiced in session.  Therapy progress/goals were reviewed and reframing techniques were discussed (e.g., identifying additional care as a stepping-stone to greater independence rather than as a barrier to it).  Options were troubleshot in session with particular emphasis on collaborating with other specialties and family members to collect as much information to make informed decisions related to the patient’s care.  Patient expressed desire to speak with her physician for this purpose; warm handoff made today to MD, Dr. Dan, after termination of session with writer.  Patient was no longer tearful and reported improved mood by end of session.  Patient will continue to benefit from additional support; neuropsych service will continue to follow patient to support mood and cognition.     Recommendations:??    The plan is to continue to support cognition and mood /adjustment and cognition     Goals: ? Facilitate Positive Coping ; support mood; support cognition   Plan: 1-2 times a week 30-60 minutes

## 2024-02-26 NOTE — PROGRESS NOTE ADULT - ATTENDING COMMENTS
Patient seen and examined with the resident. We discussed the case. I have directed the care. I edited the note. The patient requires acute rehab with 3 hours of daily therapies at least 5 out of 7 days and close physiatry follow up.  #Rehab for left cerebellar stroke with left (nondominant) and impaired cognition/ memory  likely due to small vessel dz vs A-A emboli  MRI brain w/out: acute infarcts involving the left cerebellar hemisphere without hemorrhagic transformation  HbA1c 5.8 , TSH 2.10 and   TTE Mildly enlarged L atria. Normal EF  NIHSS+2= Ataxia in 2 limbs on admission to rehab  - S/p Loaded Aspirin 325mg and Plavix 300mg  - C/w Aspirin 81 mg and Plavix 75 mg daily for 90 days  - HOB at 45 degrees, aspiration precautions  - PT/OT/ST/ neuropsych eval  - ILP placed 2/20    #HTN  Home meds: carvedilol 20 mg extended release PO daily, HCTZ 25 mg PO daily, Amlodipine 10 mg PO daily  - SBP goal: 110-150  - c/w HCTZ 12.5 mg once daily  - c/w carvedilol 6.25 mg q12hrs  - c/w amlodipine 10 mg once at bedtime  -c/w lisinopril 5 mg at bedtime   -Will monitor and adjust accordingly     #HLD  - c/w Lipitor 80 mg daily    #SARS-Cov-2 positive 2/9/2024  -No fever, no WBC count  -Isolation precautions  -Monitor   -re-test on 2/16 was positive, remains on isolation  - 10 days post positive PCR and asymptomatic, come off Airborne Isolation    #Diarrhea -intermittent  -3 loose stool episodes in AM 2/11/2024  -bowel regimen held  - no recent complaint    #Bilat knee OA/ left knee chronic pain  - no NSAIDS for now  - Tylenol PRN for pain  - Modalities   - consider Voltaren cream and/ or Lidoderm patch and Tylenol    #GI/Bowel Mgmt:/ Constipation  - finally had BM today with relief  - Miralax, senna (held 2/2 diarrhea)

## 2024-02-26 NOTE — PROGRESS NOTE ADULT - ASSESSMENT
Neuropsychology Follow Up      Treatment focused on: Cognitive Function   Pain: No  Location: N/A Rating: N/A  Intervention: N/A   Orientation: Oriented to person, place, day, month, year, and date    Arousal Level: Alert   Behavior: Cooperative with testing.     Affect/Mood Range: WFL     Needed: No   #:  NA   Attention: [facility]   Insight into illness/deficits: [facility]     Description of Current Injury(s):   Left cerebellar stroke with left hemiataxia      Other Noted Medical History:   Hyperlipidemia, hypertension, osteoarthritis.      Reason(s) for Visit:   Patient was seen for a cognitive remediation session following cognitive weakness, in the areas of short-term memory.       Measure(s) Given:   [employer] Pro      Results:      Visual Short-Term Memory:   The patient was asked to memorize several figures of various shapes and colors and then recognize these targets among slightly different distractor shapes. She was first shown a series of two pictures, and the task gradually increased in complexity. She began to exhibit difficulty when she was presented with four shapes and asked to recall them (Level 3).      The patient was asked to read a series of numbers and letters, remember them and type them afterwards in the original order. She began to exhibit difficulty when she was asked to type a series of numbers in reverse order (Level 3).       Behavioral Observations:   Patient was cooperative with testing and was engaged throughout the session requiring no redirections. As the tasks increased in difficulty, the patient would reposition herself to better self-monitor her mistakes.   									     Summary:   Patient’s cognitive profile evidenced improvement in short-term memory skills. However as attentional demands increase to include additional working memory resources, the patient demonstrates weakness within this domain. She will benefit from reminders, visual aids and reduced environmental distractions. The plan is to continue to provide cognitive remediation (on short term visual memory tasks).      Recommendations:   Patient will benefit from a quiet room with minimal distractions; memory and attention aids such as reminders, redirection, refocusing, and visual aids, among others.       Goals: Monitor cognition, support cognitive recovery and adjustment        Plan: Cognitive remediation; provide feedback prior to discharge; 1-2 sessions weekly at 30-60 minutes each  Neuropsychology Follow Up      Treatment focused on: Cognitive Function   Pain: No  Location: N/A Rating: N/A  Intervention: N/A   Orientation: Oriented to person, place, day, month, year, and date    Arousal Level: Alert   Behavior: Cooperative with testing.     Affect/Mood Range: WFL     Needed: No   #:  NA   Attention: [facility]   Insight into illness/deficits: [facility]     Description of Current Injury(s):   Left cerebellar stroke with left hemiataxia      Other Noted Medical History:   Hyperlipidemia, hypertension, osteoarthritis.      Reason(s) for Visit:   Patient was seen for a cognitive remediation session following cognitive weakness, in the areas of short-term memory.       Measure(s) Given:   [employer] Pro      Results:      Visual Short-Term Memory:   The patient was asked to memorize several figures of various shapes and colors and then recognize these targets among slightly different distractor shapes. She was first shown a series of two pictures, and the task gradually increased in complexity. She began to exhibit difficulty when she was presented with four shapes and asked to recall them      The patient was asked to read a series of numbers and letters, remember them and type them afterwards in the original order. She began to exhibit difficulty when she was asked to type a series of numbers in reverse order       Behavioral Observations:   Patient was cooperative with testing and was engaged throughout the session requiring no redirections. As the tasks increased in difficulty, the patient would reposition herself to better self-monitor her mistakes.   									     Summary:   Patient’s cognitive profile evidenced improvement in short-term memory skills. However as attentional demands increase to include additional working memory resources, the patient demonstrates weakness within this domain. She will benefit from reminders, visual aids and reduced environmental distractions. The plan is to continue to provide cognitive remediation (on short term visual memory tasks).      Recommendations:   Patient will benefit from a quiet room with minimal distractions; memory and attention aids such as reminders, redirection, refocusing, and visual aids, among others.       Goals: Monitor cognition, support cognitive recovery and adjustment        Plan: Cognitive remediation; provide feedback prior to discharge; 1-2 sessions weekly at 30-60 minutes each

## 2024-02-26 NOTE — PROGRESS NOTE ADULT - ASSESSMENT
Assessment:  This is an 84 y/o right-handed F with PMHx of HLD, HTN, L knee OA presenting for worsening unsteady gait. In the ED, vitals were significant for /72. CTH notable for L cerebellar subacute infarct. 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 NIHSS on admission was 3 for ataxia L sided and LLE. Patient was admitted to stroke unit for further work up. On 2/6/2024, the patient's BP was elevated requiring 15mg of hydralazine overnight. CTH was repeated for possible worsening dysmetria, end gaze nystagmus, and NIHSS increase from 0 to 2 (for upper and lower left extremity dysmetria), urgent CTH showed no intracranial hemorrhage or midline shift. Left cerebellar hypodense lesion consistent with evolving subacute infarct.    The patient was evaluated by the PM&R team once medically stable. The patient was found to have functional limitation in terms of muscle strength, endurance, physical mobility, and ability to carry out activities of daily living (self care, transfers, and ambulation). The patient was started on a course of bedside therapy, the pt is motivated and able to start 3 hours of therapy  daily for 6-7 days a week. With PT/OT, pt required Min A w transfers, Mod A w UB dressing, Mod A w LB dressing. Amb 15' using RW requiring Mod A.  SLP evaluated the patient and recommended regular with thin liquids.The patient was deemed to be a good candidate for admission for acute inpatient rehab. The patient was admitted to acute inpatient rehab on 2/7/2024.     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 Normal EF  NIHSS+2= Ataxia in 2 limbs on admission to rehab  - S/p Loaded Aspirin 325mg and Plavix 300mg  - C/w Aspirin 81 mg and Plavix 75 mg daily for 90 days  - HOB at 45 degrees, aspiration precautions  - PT/OT/ST/ neuropsych eval  - ILP placed 2/20    #HTN  Home meds: carvedilol 20 mg extended release PO daily, HCTZ 25 mg PO daily, Amlodipine 10 mg PO daily  - SBP goal: 110-150  - c/w HCTZ 12.5 mg once daily  - c/w carvedilol 6.25 mg q12hrs  - c/w amlodipine 10 mg once at bedtime  -c/w lisinopril 5 mg at bedtime   -Will monitor and adjust accordingly     #HLD  - c/w Lipitor 80 mg daily    #SARS-Cov-2 positive 2/9/2024  -No fever, no WBC count  -Isolation precautions  -Monitor   -re-test on 2/16 was positive, remains on isolation  - 10 days post positive PCR and asymptomatic, come off Airborne Isolation    #Diarrhea -intermittent  -3 loose stool episodes in AM 2/11/2024  -bowel regimen held  - no recent complaint    #Bilat knee OA/ left knee chronic pain  - no NSAIDS for now  - Tylenol PRN for pain  - Modalities   - consider Voltaren cream and/ or Lidoderm patch and Tylenol    #GI/Bowel Mgmt:/ Constipation  - finally had BM today with relief  - Miralax, senna (held 2/2 diarrhea)     - Diet: DASH/TLC    Precautions / PROPHYLAXIS:      - Falls      - DVT prophylaxis: Lovenox

## 2024-02-26 NOTE — PROGRESS NOTE ADULT - SUBJECTIVE AND OBJECTIVE BOX
Patient is an 83 y. old female who presents with a chief complaint of Rehab of left cerebellar stroke with left hemiataxia.(nondominant) (07 Feb 2024 14:25)    HPI:  This is an 82 y/o RHD F with PMHx of HLD, HTN, L knee OA presenting for unsteady gait.    Patient states she was in her normal state of health a day prior to presentation. She was outside having lunch with her friend when she started to notice she was off balance that persists after coming back home. For worsening unsteady gait and imbalance she decided  to seek medical help the next day. She admits to vomiting, but denies any HA, diplopia, N, dizziness, numbness/tingling, speech difficulties. In the ED, vitals were significant for /72. CTH notable for L cerebellar subacute infarct. 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 NIHSS on admission was 3. Patient was admitted to stroke unit for further work up.     On 2/6/2204, the patient's BP was elevated requiring 15mg of hydralazine overnight. CTH was repeated for possible worsening dysmetria, end gaze nystagmus, and NIHSS increase from 0 to 2 (for upper and lower left extremity dysmetria), urgent CTH showed no intracranial hemorrhage or midline shift. Left cerebellar hypodense lesion consistent with evolving subacute infarct.    The patient was evaluated by the PM&R team once medically stable. The patient was found to have functional limitation in terms of muscle strength, endurance, physical mobility, and ability to carry out activities of daily living (self care, transfers, and ambulation). The patient was started on a course of bedside therapy, the pt is motivated and able to start 3 hours of therapy  daily for 6-7 days a week. With PT/OT,  pt required Min A w transfers, Mod A w UB dressing, Mod A w LB dressing. Amb 15' using RW requiring Mod A. SLP evaluated the patient and recommended regular with thin liquids. The patient was deemed to be a good candidate for admission for acute inpatient rehab. The patient was admitted to acute inpatient rehab on 2/7/24.    (07 Feb 2024 14:25)      TODAY'S SUBJECTIVE & REVIEW OF SYMPTOMS:    Patient was seen and examined at bedside this morning. No acute overnight events. No acute complaints. Patient in good spirits and participating/tolerating therapies well and making functional gains. Having regular BMs and voiding spontaneously. Vitals reviewed and stable. AM labs reviewed and unremarkable.    CLOF: SA for transfers, Mod A for UB dressing, Mod A for LB dressing. Ambulates 50ft with RW requiring SA.    Review of Systems:   Constitutional:    [x ] WNL           [   ] poor appetite   [   ] insomnia   [  ] tired   Cardio:                [ x ] WNL           [   ] CP   [  ] BARBOSA   [   ] palpitations               Resp:                   [ x  ] WNL           [   ] SOB   [  ] cough  [   ] wheezing   GI:                        [   ] WNL           [ x  ] constipation  [   ] diarrhea   [   ] abdominal pain   [   ] nausea   [   ] emesis                                :                      [ x  ] WNL           [   ] FRANCES  [   ] dysuria   [   ] difficulty voiding   []: Other:    Endo:                   [ x  ] WNL          [   ] polyuria   [   ] temperature intolerance                 Skin:                     [ x  ] WNL          [   ] pain   [   ] wound  [   ] rash   MSK:                    [  ] WNL          [  ] muscle pain   [ x ] joint pain/ stiffness-b/l knee pain L>R -stable  [   ] muscle tenderness   [   ] swelling   Neuro:                 [   ] WNL          [   ] HA   [   ] change in vision   [   ] tremor   [x  ] LSW weakness  [   ]dysphagia            Cognitive:           [ x ] WNL           [  ]confusion      Psych:                  [  x ] WNL           [   ] hallucinations   [   ]agitation   [   ] delusion   [   ]depression    PHYSICAL EXAM    Vital Signs Last 24 Hrs  T(C): 36.5 (26 Feb 2024 05:28), Max: 36.8 (25 Feb 2024 20:35)  T(F): 97.7 (26 Feb 2024 05:28), Max: 98.3 (25 Feb 2024 20:35)  HR: 73 (26 Feb 2024 05:48) (59 - 73)  BP: 129/60 (26 Feb 2024 05:28) (124/58 - 129/60)  BP(mean): --  RR: 19 (26 Feb 2024 05:28) (18 - 19)  SpO2: --    General:[ x  ] NAD, Resting Comfortable   [   ] other:                                HEENT: [ x  ] NC/AT, EOMI,  Normal Conjunctivae,   [   ] other:   Cardio: [  x ] RRR, no murmur,   [   ] other:                              Pulm: [ x  ] No Respiratory Distress,  Lungs CTAB,   [   ] other:             Abdomen: [ x  ]ND/NT, Soft,   [   ] other:    : [ x  ] NO FRANCES CATHETER, [   ] FRANCES CATHETER present [ x ] other: PureWick  MSK: [ x ] No joint swelling, Full ROM   [   ] other:                                         Ext: [x ]No C/C/E, No calf tenderness,   [ ]other:    Skin: [   ]intact,   [ x  ] other: incision along L sternal area c/d/i.     Neurological Examination:  Cognitive: [  x  ] AAO x 3,   [  ]  other:     Attention:  [ x  ] intact   [    ]  other:             Concentration: [ x  ] intact   [    ]  other:   Language: [  ] intact  [ x ] Able to name 3 objects and describe function          Memory: [   ] intact,    [ x ]  other: Impaired, able to recall 1/3 objects   Mood/Affect: [   ] wnl  [  x  ]  other: emotional labile (crying)                                                                          Communication: [ x  ]Fluent, no dysarthria [ ] other:   CN II - XII:  [    ] intact,  [  x  ] other: horizontal  nystagmus noted  with fixation                                                                                        Motor:   RUE: 5/5: shoulder abduction, elbow flexion, elbow extension,  finger flexion  LUE: 4+/5: shoulder abduction, 4+/5 elbow flexion, elbow extension, 4+/5 finger flexion  RLE: 5/5 hip flexion, knee ext, knee flex, ankle dorsiflexion, and ankle  plantarflexion  LLE: 4+/5: hip flexion, knee ext, knee flex, ankle dorsiflexion, and ankle  plantarflexion    Tone: [  x ] wnl,   [    ]  other:  DTRs: [ x ]symmetric, [   ] other:  Coordination:   [    ] intact,   [ x ] other: +FTN, and heel to shin on the L, + L dysdiadokinesia / dysmetria.                                                                     Sensory: [ x  ] Intact to light touch,   [  ] other:    No clonus; No spasticity    MEDICATIONS  (STANDING):  amLODIPine   Tablet 10 milliGRAM(s) Oral at bedtime  aspirin enteric coated 81 milliGRAM(s) Oral daily  atorvastatin 80 milliGRAM(s) Oral at bedtime  carvedilol 6.25 milliGRAM(s) Oral every 12 hours  chlorhexidine 2% Cloths 1 Application(s) Topical <User Schedule>  clopidogrel Tablet 75 milliGRAM(s) Oral daily  dextrose 5% + sodium chloride 0.45%. 1000 milliLiter(s) (75 mL/Hr) IV Continuous <Continuous>  enoxaparin Injectable 40 milliGRAM(s) SubCutaneous every 24 hours  famotidine    Tablet 20 milliGRAM(s) Oral two times a day  hydrochlorothiazide 12.5 milliGRAM(s) Oral daily  lisinopril 5 milliGRAM(s) Oral at bedtime  VOLTAREN  GEL  1%  TOPICAL OINTMENT 1 Application(s) 1 Application(s) Topical three times a day    MEDICATIONS  (PRN):  acetaminophen     Tablet .. 650 milliGRAM(s) Oral every 6 hours PRN Temp greater or equal to 38C (100.4F), Mild Pain (1 - 3)  melatonin 3 milliGRAM(s) Oral at bedtime PRN Insomnia  traMADol 25 milliGRAM(s) Oral every 6 hours PRN severe pain shoulders      RECENT LABS/IMAGING    02-26    138  |  103  |  17  ----------------------------<  101<H>  3.7   |  22  |  0.7    Ca    10.4      26 Feb 2024 07:12  Mg     1.8     02-26    TPro  6.3  /  Alb  4.0  /  TBili  0.6  /  DBili  x   /  AST  23  /  ALT  38  /  AlkPhos  77  02-26                                    12.5   5.90  )-----------( 211      ( 26 Feb 2024 07:12 )             36.1

## 2024-02-27 ENCOUNTER — TRANSCRIPTION ENCOUNTER (OUTPATIENT)
Age: 84
End: 2024-02-27

## 2024-02-27 RX ORDER — POLYETHYLENE GLYCOL 3350 17 G/17G
17 POWDER, FOR SOLUTION ORAL DAILY
Refills: 0 | Status: DISCONTINUED | OUTPATIENT
Start: 2024-02-27 | End: 2024-02-28

## 2024-02-27 RX ADMIN — POLYETHYLENE GLYCOL 3350 17 GRAM(S): 17 POWDER, FOR SOLUTION ORAL at 13:25

## 2024-02-27 RX ADMIN — CARVEDILOL PHOSPHATE 6.25 MILLIGRAM(S): 80 CAPSULE, EXTENDED RELEASE ORAL at 18:03

## 2024-02-27 RX ADMIN — ENOXAPARIN SODIUM 40 MILLIGRAM(S): 100 INJECTION SUBCUTANEOUS at 21:54

## 2024-02-27 RX ADMIN — ATORVASTATIN CALCIUM 80 MILLIGRAM(S): 80 TABLET, FILM COATED ORAL at 21:55

## 2024-02-27 RX ADMIN — Medication 81 MILLIGRAM(S): at 13:25

## 2024-02-27 RX ADMIN — CHLORHEXIDINE GLUCONATE 1 APPLICATION(S): 213 SOLUTION TOPICAL at 06:30

## 2024-02-27 RX ADMIN — FAMOTIDINE 20 MILLIGRAM(S): 10 INJECTION INTRAVENOUS at 18:03

## 2024-02-27 RX ADMIN — AMLODIPINE BESYLATE 10 MILLIGRAM(S): 2.5 TABLET ORAL at 21:55

## 2024-02-27 RX ADMIN — CARVEDILOL PHOSPHATE 6.25 MILLIGRAM(S): 80 CAPSULE, EXTENDED RELEASE ORAL at 06:30

## 2024-02-27 RX ADMIN — FAMOTIDINE 20 MILLIGRAM(S): 10 INJECTION INTRAVENOUS at 06:30

## 2024-02-27 RX ADMIN — CLOPIDOGREL BISULFATE 75 MILLIGRAM(S): 75 TABLET, FILM COATED ORAL at 13:25

## 2024-02-27 NOTE — PROGRESS NOTE ADULT - ASSESSMENT
Neuropsychology Follow-Up       Treatment Session Focused on Mood    Pain: Denied Location:  NA Ratin/10     Intervention: NA   Orientation: WFL    Arousal Level: Alert   Behavior: Cooperative, pleasant   Affect Range:  Overall euthymic but somewhat constricted    Needed:  No  #: N/A  Attention: WFL    Insight into illness/deficits: Good     Writer met with patient today to assess mood/adjustment to follow up from yesterday’s session when patient was found to be tearful.  She was not tearful and appeared ot be in good spirits resting comfrtably in bed.  She reported her mood as “good” but admitted to some lingering anxiety following discharge.  Relaxation techniques were reviewed, emotions processed and support provided.  Patient declined formal session as she stated she wanted to rest due to fatigue from prior therapy session.  Patient will continue to benefit from additional support; neuropsychology service will continue to follow patient to support mood and cognition.     Recommendations:??    The plan is to continue to support cognition and mood /adjustment and cognition     Goals: ? Facilitate Positive Coping ; support mood; support cognition   Plan: 1-2 times a week 30-60 minutes

## 2024-02-27 NOTE — PROGRESS NOTE ADULT - ASSESSMENT
Assessment:  This is an 82 y/o right-handed F with PMHx of HLD, HTN, L knee OA presenting for worsening unsteady gait. In the ED, vitals were significant for /72. CTH notable for L cerebellar subacute infarct. 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 NIHSS on admission was 3 for ataxia L sided and LLE. Patient was admitted to stroke unit for further work up. On 2/6/2024, the patient's BP was elevated requiring 15mg of hydralazine overnight. CTH was repeated for possible worsening dysmetria, end gaze nystagmus, and NIHSS increase from 0 to 2 (for upper and lower left extremity dysmetria), urgent CTH showed no intracranial hemorrhage or midline shift. Left cerebellar hypodense lesion consistent with evolving subacute infarct.    The patient was evaluated by the PM&R team once medically stable. The patient was found to have functional limitation in terms of muscle strength, endurance, physical mobility, and ability to carry out activities of daily living (self care, transfers, and ambulation). The patient was started on a course of bedside therapy, the pt is motivated and able to start 3 hours of therapy  daily for 6-7 days a week. With PT/OT, pt required Min A w transfers, Mod A w UB dressing, Mod A w LB dressing. Amb 15' using RW requiring Mod A.  SLP evaluated the patient and recommended regular with thin liquids.The patient was deemed to be a good candidate for admission for acute inpatient rehab. The patient was admitted to acute inpatient rehab on 2/7/2024.     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 Normal EF  NIHSS+2= Ataxia in 2 limbs on admission to rehab  - S/p Loaded Aspirin 325mg and Plavix 300mg  - C/w Aspirin 81 mg and Plavix 75 mg daily for 90 days  - HOB at 45 degrees, aspiration precautions  - PT/OT/ST/ neuropsych eval  - ILP placed 2/20    #HTN  Home meds: carvedilol 20 mg extended release PO daily, HCTZ 25 mg PO daily, Amlodipine 10 mg PO daily  - SBP goal: 110-150  - c/w HCTZ 12.5 mg once daily  - c/w carvedilol 6.25 mg q12hrs  - c/w amlodipine 10 mg once at bedtime  -c/w lisinopril 5 mg at bedtime   -Will monitor and adjust accordingly     #HLD  - c/w Lipitor 80 mg daily    #SARS-Cov-2 positive 2/9/2024  -No fever, no WBC count  -Isolation precautions  -Monitor   -re-test on 2/16 was positive, remains on isolation  - 10 days post positive PCR and asymptomatic, come off Airborne Isolation    #Diarrhea -intermittent  -3 loose stool episodes in AM 2/11/2024  -bowel regimen adjusted  - no recent complaint    #Bilat knee OA/ left knee chronic pain  - no NSAIDS for now  - Tylenol PRN for pain  - Modalities   - consider Voltaren cream and/ or Lidoderm patch and Tylenol    #GI/Bowel Mgmt:/ Constipation  - finally had BM today with relief  - Senna (held 2/2 diarrhea)   -c/w Miralax QD    - Diet: DASH/TLC    Precautions / PROPHYLAXIS:      - Falls      - DVT prophylaxis: Lovenox

## 2024-02-27 NOTE — DISCHARGE NOTE NURSING/CASE MANAGEMENT/SOCIAL WORK - NSDCPEFALRISK_GEN_ALL_CORE
For information on Fall & Injury Prevention, visit: [email or web address] OR  [email or web address] OR  https://www.cdc.gov/steadi/patient.html

## 2024-02-27 NOTE — PROGRESS NOTE ADULT - SUBJECTIVE AND OBJECTIVE BOX
Patient is an 83 y. old female who presents with a chief complaint of Rehab of left cerebellar stroke with left hemiataxia.(nondominant) (07 Feb 2024 14:25)    HPI:  This is an 84 y/o RHD F with PMHx of HLD, HTN, L knee OA presenting for unsteady gait.    Patient states she was in her normal state of health a day prior to presentation. She was outside having lunch with her friend when she started to notice she was off balance that persists after coming back home. For worsening unsteady gait and imbalance she decided  to seek medical help the next day. She admits to vomiting, but denies any HA, diplopia, N, dizziness, numbness/tingling, speech difficulties. In the ED, vitals were significant for /72. CTH notable for L cerebellar subacute infarct. 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 NIHSS on admission was 3. Patient was admitted to stroke unit for further work up.     On 2/6/2204, the patient's BP was elevated requiring 15mg of hydralazine overnight. CTH was repeated for possible worsening dysmetria, end gaze nystagmus, and NIHSS increase from 0 to 2 (for upper and lower left extremity dysmetria), urgent CTH showed no intracranial hemorrhage or midline shift. Left cerebellar hypodense lesion consistent with evolving subacute infarct.    The patient was evaluated by the PM&R team once medically stable. The patient was found to have functional limitation in terms of muscle strength, endurance, physical mobility, and ability to carry out activities of daily living (self care, transfers, and ambulation). The patient was started on a course of bedside therapy, the pt is motivated and able to start 3 hours of therapy  daily for 6-7 days a week. With PT/OT,  pt required Min A w transfers, Mod A w UB dressing, Mod A w LB dressing. Amb 15' using RW requiring Mod A. SLP evaluated the patient and recommended regular with thin liquids. The patient was deemed to be a good candidate for admission for acute inpatient rehab. The patient was admitted to acute inpatient rehab on 2/7/24.    (07 Feb 2024 14:25)    TODAY'S SUBJECTIVE & REVIEW OF SYMPTOMS:    Patient was seen and examined at bedside this morning. No acute overnight events. No acute complaints. Patient in good spirits and participating/tolerating therapies well and making functional gains. States she has not had a BM in two days. Will assess her bowel regimen. Voiding spontaneously. Vitals reviewed and stable.     CLOF: SA for transfers, Mod A for UB dressing, Mod A for LB dressing. Ambulates 50ft with RW requiring SA.    Review of Systems:   Constitutional:    [x ] WNL           [   ] poor appetite   [   ] insomnia   [  ] tired   Cardio:                [ x ] WNL           [   ] CP   [  ] BARBOSA   [   ] palpitations               Resp:                   [ x  ] WNL           [   ] SOB   [  ] cough  [   ] wheezing   GI:                        [   ] WNL           [ x  ] constipation  [   ] diarrhea   [   ] abdominal pain   [   ] nausea   [   ] emesis                                :                      [ x  ] WNL           [   ] FRANCES  [   ] dysuria   [   ] difficulty voiding   []: Other:    Endo:                   [ x  ] WNL          [   ] polyuria   [   ] temperature intolerance                 Skin:                     [ x  ] WNL          [   ] pain   [   ] wound  [   ] rash   MSK:                    [  ] WNL          [  ] muscle pain   [ x ] joint pain/ stiffness-b/l knee pain L>R -stable  [   ] muscle tenderness   [   ] swelling   Neuro:                 [   ] WNL          [   ] HA   [   ] change in vision   [   ] tremor   [x  ] LSW weakness  [   ]dysphagia            Cognitive:           [ x ] WNL           [  ]confusion      Psych:                  [  x ] WNL           [   ] hallucinations   [   ]agitation   [   ] delusion   [   ]depression    PHYSICAL EXAM    Vital Signs Last 24 Hrs  T(C): 36.7 (27 Feb 2024 14:19), Max: 36.7 (27 Feb 2024 14:19)  T(F): 98 (27 Feb 2024 14:19), Max: 98 (27 Feb 2024 14:19)  HR: 77 (27 Feb 2024 14:19) (62 - 77)  BP: 132/80 (27 Feb 2024 14:19) (119/59 - 139/67)  BP(mean): 80 (26 Feb 2024 21:06) (80 - 80)  RR: 18 (27 Feb 2024 14:19) (18 - 18)  SpO2: --    General:[ x  ] NAD, Resting Comfortable   [   ] other:                                HEENT: [ x  ] NC/AT, EOMI,  Normal Conjunctivae,   [   ] other:   Cardio: [  x ] RRR, no murmur,   [   ] other:                              Pulm: [ x  ] No Respiratory Distress,  Lungs CTAB,   [   ] other:             Abdomen: [ x  ]ND/NT, Soft,   [   ] other:    : [ x  ] NO FRANCES CATHETER, [   ] FRANCES CATHETER present [ x ] other: PureWick  MSK: [ x ] No joint swelling, Full ROM   [   ] other:                                         Ext: [x ]No C/C/E, No calf tenderness,   [ ]other:    Skin: [   ]intact,   [ x  ] other: incision along L sternal area c/d/i.     Neurological Examination:  Cognitive: [  x  ] AAO x 3,   [  ]  other:     Attention:  [ x  ] intact   [    ]  other:             Concentration: [ x  ] intact   [    ]  other:   Language: [  ] intact  [ x ] Able to name 3 objects and describe function          Memory: [   ] intact,    [ x ]  other: Impaired, able to recall 1/3 objects   Mood/Affect: [   ] wnl  [  x  ]  other: emotional labile (crying)                                                                          Communication: [ x  ]Fluent, no dysarthria [ ] other:   CN II - XII:  [    ] intact,  [  x  ] other: horizontal  nystagmus noted  with fixation                                                                                        Motor:   RUE: 5/5: shoulder abduction, elbow flexion, elbow extension,  finger flexion  LUE: 4+/5: shoulder abduction, 4+/5 elbow flexion, elbow extension, 4+/5 finger flexion  RLE: 5/5 hip flexion, knee ext, knee flex, ankle dorsiflexion, and ankle  plantarflexion  LLE: 4+/5: hip flexion, knee ext, knee flex, ankle dorsiflexion, and ankle  plantarflexion    Tone: [  x ] wnl,   [    ]  other:  DTRs: [ x ]symmetric, [   ] other:  Coordination:   [    ] intact,   [ x ] other: +FTN, and heel to shin on the L, + L dysdiadokinesia / dysmetria.                                                                     Sensory: [ x  ] Intact to light touch,   [  ] other:    No clonus; No spasticity    MEDICATIONS  (STANDING):  amLODIPine   Tablet 10 milliGRAM(s) Oral at bedtime  aspirin enteric coated 81 milliGRAM(s) Oral daily  atorvastatin 80 milliGRAM(s) Oral at bedtime  carvedilol 6.25 milliGRAM(s) Oral every 12 hours  chlorhexidine 2% Cloths 1 Application(s) Topical <User Schedule>  clopidogrel Tablet 75 milliGRAM(s) Oral daily  dextrose 5% + sodium chloride 0.45%. 1000 milliLiter(s) (75 mL/Hr) IV Continuous <Continuous>  enoxaparin Injectable 40 milliGRAM(s) SubCutaneous every 24 hours  famotidine    Tablet 20 milliGRAM(s) Oral two times a day  hydrochlorothiazide 12.5 milliGRAM(s) Oral daily  lisinopril 5 milliGRAM(s) Oral at bedtime  polyethylene glycol 3350 17 Gram(s) Oral daily  VOLTAREN  GEL  1%  TOPICAL OINTMENT 1 Application(s) 1 Application(s) Topical three times a day    MEDICATIONS  (PRN):  acetaminophen     Tablet .. 650 milliGRAM(s) Oral every 6 hours PRN Temp greater or equal to 38C (100.4F), Mild Pain (1 - 3)  melatonin 3 milliGRAM(s) Oral at bedtime PRN Insomnia  traMADol 25 milliGRAM(s) Oral every 6 hours PRN severe pain shoulders      RECENT LABS/IMAGING    02-26    138  |  103  |  17  ----------------------------<  101<H>  3.7   |  22  |  0.7    Ca    10.4      26 Feb 2024 07:12  Mg     1.8     02-26    TPro  6.3  /  Alb  4.0  /  TBili  0.6  /  DBili  x   /  AST  23  /  ALT  38  /  AlkPhos  77  02-26                                    12.5   5.90  )-----------( 211      ( 26 Feb 2024 07:12 )             36.1

## 2024-02-27 NOTE — DISCHARGE NOTE NURSING/CASE MANAGEMENT/SOCIAL WORK - PATIENT PORTAL LINK FT
You can access the FollowMyHealth Patient Portal offered by [facility] by registering at the following website: [email or web address]. By joining [employer]’s FollowMyHealth portal, you will also be able to view your health information using other applications (apps) compatible with our system.

## 2024-02-27 NOTE — PROGRESS NOTE ADULT - ATTENDING COMMENTS
Patient seen and examined with the resident. We discussed the case. I have directed the care. I edited the note. The patient requires acute rehab with 3 hours of daily therapies at least 5 out of 7 days and close physiatry follow up. I added MiraLax daily.  #Rehab for left cerebellar stroke with left (nondominant) and impaired cognition/ memory  likely due to small vessel dz vs A-A emboli  MRI brain w/out: acute infarcts involving the left cerebellar hemisphere without hemorrhagic transformation  HbA1c 5.8 , TSH 2.10 and   TTE Mildly enlarged L atria. Normal EF  NIHSS+2= Ataxia in 2 limbs on admission to rehab  - S/p Loaded Aspirin 325mg and Plavix 300mg  - C/w Aspirin 81 mg and Plavix 75 mg daily for 90 days  - HOB at 45 degrees, aspiration precautions  - PT/OT/ST/ neuropsych eval  - ILP placed 2/20    #HTN  Home meds: carvedilol 20 mg extended release PO daily, HCTZ 25 mg PO daily, Amlodipine 10 mg PO daily  - SBP goal: 110-150  - c/w HCTZ 12.5 mg once daily  - c/w carvedilol 6.25 mg q12hrs  - c/w amlodipine 10 mg once at bedtime  -c/w lisinopril 5 mg at bedtime   -Will monitor and adjust accordingly     #HLD  - c/w Lipitor 80 mg daily    #SARS-Cov-2 positive 2/9/2024  -No fever, no WBC count  -Isolation precautions  -Monitor   -re-test on 2/16 was positive, remains on isolation  - 10 days post positive PCR and asymptomatic, come off Airborne Isolation    #Diarrhea -intermittent  -3 loose stool episodes in AM 2/11/2024  -bowel regimen adjusted  - no recent complaint    #Bilat knee OA/ left knee chronic pain  - no NSAIDS for now  - Tylenol PRN for pain  - Modalities   - consider Voltaren cream and/ or Lidoderm patch and Tylenol    #GI/Bowel Mgmt:/ Constipation  - finally had BM today with relief  - Senna (held 2/2 diarrhea)   -c/w Miralax QD    - Diet: DASH/TLC

## 2024-02-28 RX ORDER — POLYETHYLENE GLYCOL 3350 17 G/17G
17 POWDER, FOR SOLUTION ORAL
Refills: 0 | Status: DISCONTINUED | OUTPATIENT
Start: 2024-02-28 | End: 2024-03-02

## 2024-02-28 RX ADMIN — ENOXAPARIN SODIUM 40 MILLIGRAM(S): 100 INJECTION SUBCUTANEOUS at 21:53

## 2024-02-28 RX ADMIN — AMLODIPINE BESYLATE 10 MILLIGRAM(S): 2.5 TABLET ORAL at 21:52

## 2024-02-28 RX ADMIN — CARVEDILOL PHOSPHATE 6.25 MILLIGRAM(S): 80 CAPSULE, EXTENDED RELEASE ORAL at 06:28

## 2024-02-28 RX ADMIN — POLYETHYLENE GLYCOL 3350 17 GRAM(S): 17 POWDER, FOR SOLUTION ORAL at 17:19

## 2024-02-28 RX ADMIN — Medication 81 MILLIGRAM(S): at 12:11

## 2024-02-28 RX ADMIN — FAMOTIDINE 20 MILLIGRAM(S): 10 INJECTION INTRAVENOUS at 17:19

## 2024-02-28 RX ADMIN — CLOPIDOGREL BISULFATE 75 MILLIGRAM(S): 75 TABLET, FILM COATED ORAL at 12:11

## 2024-02-28 RX ADMIN — LISINOPRIL 5 MILLIGRAM(S): 2.5 TABLET ORAL at 21:52

## 2024-02-28 RX ADMIN — CARVEDILOL PHOSPHATE 6.25 MILLIGRAM(S): 80 CAPSULE, EXTENDED RELEASE ORAL at 17:19

## 2024-02-28 RX ADMIN — FAMOTIDINE 20 MILLIGRAM(S): 10 INJECTION INTRAVENOUS at 06:28

## 2024-02-28 RX ADMIN — CHLORHEXIDINE GLUCONATE 1 APPLICATION(S): 213 SOLUTION TOPICAL at 06:28

## 2024-02-28 RX ADMIN — ATORVASTATIN CALCIUM 80 MILLIGRAM(S): 80 TABLET, FILM COATED ORAL at 21:52

## 2024-02-28 RX ADMIN — POLYETHYLENE GLYCOL 3350 17 GRAM(S): 17 POWDER, FOR SOLUTION ORAL at 09:22

## 2024-02-28 NOTE — PROGRESS NOTE ADULT - TIME BILLING
I participated in the rehab interdisciplinary team meeting; the patient progressed to ambulate 150 ft RW Sup. I doubled MiraLax to BID.   She will go home on home PT, OT, Speech and outpatient neuropsychology.

## 2024-02-28 NOTE — PROGRESS NOTE ADULT - SUBJECTIVE AND OBJECTIVE BOX
Patient is an 83 y. old female who presents with a chief complaint of Rehab of left cerebellar stroke with left hemiataxia.(nondominant) (07 Feb 2024 14:25)    HPI:  This is an 84 y/o RHD F with PMHx of HLD, HTN, L knee OA presenting for unsteady gait.    Patient states she was in her normal state of health a day prior to presentation. She was outside having lunch with her friend when she started to notice she was off balance that persists after coming back home. For worsening unsteady gait and imbalance she decided  to seek medical help the next day. She admits to vomiting, but denies any HA, diplopia, N, dizziness, numbness/tingling, speech difficulties. In the ED, vitals were significant for /72. CTH notable for L cerebellar subacute infarct. 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 NIHSS on admission was 3. Patient was admitted to stroke unit for further work up.     On 2/6/2204, the patient's BP was elevated requiring 15mg of hydralazine overnight. CTH was repeated for possible worsening dysmetria, end gaze nystagmus, and NIHSS increase from 0 to 2 (for upper and lower left extremity dysmetria), urgent CTH showed no intracranial hemorrhage or midline shift. Left cerebellar hypodense lesion consistent with evolving subacute infarct.    The patient was evaluated by the PM&R team once medically stable. The patient was found to have functional limitation in terms of muscle strength, endurance, physical mobility, and ability to carry out activities of daily living (self care, transfers, and ambulation). The patient was started on a course of bedside therapy, the pt is motivated and able to start 3 hours of therapy  daily for 6-7 days a week. With PT/OT,  pt required Min A w transfers, Mod A w UB dressing, Mod A w LB dressing. Amb 15' using RW requiring Mod A. SLP evaluated the patient and recommended regular with thin liquids. The patient was deemed to be a good candidate for admission for acute inpatient rehab. The patient was admitted to acute inpatient rehab on 2/7/24.    (07 Feb 2024 14:25)    TODAY'S SUBJECTIVE & REVIEW OF SYMPTOMS:    Patient was seen and examined at bedside this morning. No acute overnight events. No acute complaints. Patient in good spirits and participating/tolerating therapies well and making functional gains. She is having and has chronic constipation. She likely can do well on MiraLax twice a day. Voiding spontaneously. Vitals reviewed and stable.     CLOF: SA for transfers, Mod A for UB dressing, Mod A for LB dressing. Ambulates 50ft with RW requiring SA.    Review of Systems:   Constitutional:    [x ] WNL           [   ] poor appetite   [   ] insomnia   [  ] tired   Cardio:                [ x ] WNL           [   ] CP   [  ] BARBOSA   [   ] palpitations               Resp:                   [ x  ] WNL           [   ] SOB   [  ] cough  [   ] wheezing   GI:                        [   ] WNL           [ x  ] constipation  [   ] diarrhea   [   ] abdominal pain   [   ] nausea   [   ] emesis                                :                      [ x  ] WNL           [   ] FRANCES  [   ] dysuria   [   ] difficulty voiding   []: Other:    Endo:                   [ x  ] WNL          [   ] polyuria   [   ] temperature intolerance                 Skin:                     [ x  ] WNL          [   ] pain   [   ] wound  [   ] rash   MSK:                    [  ] WNL          [  ] muscle pain   [ x ] joint pain/ stiffness-b/l knee pain L>R -stable  [   ] muscle tenderness   [   ] swelling   Neuro:                 [   ] WNL          [   ] HA   [   ] change in vision   [   ] tremor   [x  ] LSW weakness  [   ]dysphagia            Cognitive:           [ x ] WNL           [  ]confusion      Psych:                  [  x ] WNL           [   ] hallucinations   [   ]agitation   [   ] delusion   [   ]depression    PHYSICAL EXAM    ICU Vital Signs Last 24 Hrs  T(C): 36.2 (28 Feb 2024 12:40), Max: 36.6 (28 Feb 2024 06:28)  T(F): 97.1 (28 Feb 2024 12:40), Max: 97.9 (28 Feb 2024 06:28)  HR: 82 (28 Feb 2024 12:40) (57 - 82)  BP: 123/58 (28 Feb 2024 12:40) (123/58 - 144/65)  BP(mean): 88 (28 Feb 2024 06:28) (87 - 88)  ABP: --  ABP(mean): --  RR: 20 (28 Feb 2024 12:40) (20 - 20)  SpO2: --        General:[ x  ] NAD, Resting Comfortable   [   ] other:                                HEENT: [ x  ] NC/AT, EOMI,  Normal Conjunctivae,   [   ] other:   Cardio: [  x ] RRR, no murmur,   [   ] other:                              Pulm: [ x  ] No Respiratory Distress,  Lungs CTAB,   [   ] other:             Abdomen: [ x  ]ND/NT, Soft,   [   ] other:    : [ x  ] NO FRANCES CATHETER, [   ] FRANCES CATHETER present [ x ] other: PureWick  MSK: [ x ] No joint swelling, Full ROM   [   ] other:                                         Ext: [x ]No C/C/E, No calf tenderness,   [ ]other:    Skin: [   ]intact,   [ x  ] other: incision along L sternal area c/d/i.     Neurological Examination:  Cognitive: [  x  ] AAO x 3,   [  ]  other:     Attention:  [ x  ] intact   [    ]  other:             Concentration: [ x  ] intact   [    ]  other:   Language: [  ] intact  [ x ] Able to name 3 objects and describe function          Memory: [   ] intact,    [ x ]  other: Impaired, able to recall 1/3 objects   Mood/Affect: [   ] wnl  [  x  ]  other: emotional labile (crying)                                                                          Communication: [ x  ]Fluent, no dysarthria [ ] other:   CN II - XII:  [    ] intact,  [  x  ] other: horizontal  nystagmus noted  with fixation                                                                                        Motor:   RUE: 5/5: shoulder abduction, elbow flexion, elbow extension,  finger flexion  LUE: 4+/5: shoulder abduction, 4+/5 elbow flexion, elbow extension, 4+/5 finger flexion  RLE: 5/5 hip flexion, knee ext, knee flex, ankle dorsiflexion, and ankle  plantarflexion  LLE: 4+/5: hip flexion, knee ext, knee flex, ankle dorsiflexion, and ankle  plantarflexion    Tone: [  x ] wnl,   [    ]  other:  DTRs: [ x ]symmetric, [   ] other:  Coordination:   [    ] intact,   [ x ] other: +FTN, and heel to shin on the L, + L dysdiadokinesia / dysmetria.                                                                     Sensory: [ x  ] Intact to light touch,   [  ] other:    No clonus; No spasticity    MEDICATIONS  (STANDING):  amLODIPine   Tablet 10 milliGRAM(s) Oral at bedtime  aspirin enteric coated 81 milliGRAM(s) Oral daily  atorvastatin 80 milliGRAM(s) Oral at bedtime  carvedilol 6.25 milliGRAM(s) Oral every 12 hours  chlorhexidine 2% Cloths 1 Application(s) Topical <User Schedule>  clopidogrel Tablet 75 milliGRAM(s) Oral daily  dextrose 5% + sodium chloride 0.45%. 1000 milliLiter(s) (75 mL/Hr) IV Continuous <Continuous>  enoxaparin Injectable 40 milliGRAM(s) SubCutaneous every 24 hours  famotidine    Tablet 20 milliGRAM(s) Oral two times a day  hydrochlorothiazide 12.5 milliGRAM(s) Oral daily  lisinopril 5 milliGRAM(s) Oral at bedtime  polyethylene glycol 3350 17 Gram(s) Oral daily  VOLTAREN  GEL  1%  TOPICAL OINTMENT 1 Application(s) 1 Application(s) Topical three times a day    MEDICATIONS  (PRN):  acetaminophen     Tablet .. 650 milliGRAM(s) Oral every 6 hours PRN Temp greater or equal to 38C (100.4F), Mild Pain (1 - 3)  melatonin 3 milliGRAM(s) Oral at bedtime PRN Insomnia  traMADol 25 milliGRAM(s) Oral every 6 hours PRN severe pain shoulders      RECENT LABS/IMAGING    02-26    138  |  103  |  17  ----------------------------<  101<H>  3.7   |  22  |  0.7    Ca    10.4      26 Feb 2024 07:12  Mg     1.8     02-26    TPro  6.3  /  Alb  4.0  /  TBili  0.6  /  DBili  x   /  AST  23  /  ALT  38  /  AlkPhos  77  02-26                                    12.5   5.90  )-----------( 211      ( 26 Feb 2024 07:12 )             36.1

## 2024-02-28 NOTE — PROGRESS NOTE ADULT - ASSESSMENT
Assessment:  This is an 84 y/o right-handed F with PMHx of HLD, HTN, L knee OA presenting for worsening unsteady gait. In the ED, vitals were significant for /72. CTH notable for L cerebellar subacute infarct. 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 NIHSS on admission was 3 for ataxia L sided and LLE. Patient was admitted to stroke unit for further work up. On 2/6/2024, the patient's BP was elevated requiring 15mg of hydralazine overnight. CTH was repeated for possible worsening dysmetria, end gaze nystagmus, and NIHSS increase from 0 to 2 (for upper and lower left extremity dysmetria), urgent CTH showed no intracranial hemorrhage or midline shift. Left cerebellar hypodense lesion consistent with evolving subacute infarct.    The patient was evaluated by the PM&R team once medically stable. The patient was found to have functional limitation in terms of muscle strength, endurance, physical mobility, and ability to carry out activities of daily living (self care, transfers, and ambulation). The patient was started on a course of bedside therapy, the pt is motivated and able to start 3 hours of therapy  daily for 6-7 days a week. With PT/OT, pt required Min A w transfers, Mod A w UB dressing, Mod A w LB dressing. Amb 15' using RW requiring Mod A.  SLP evaluated the patient and recommended regular with thin liquids.The patient was deemed to be a good candidate for admission for acute inpatient rehab. The patient was admitted to acute inpatient rehab on 2/7/2024.     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 Normal EF  NIHSS+2= Ataxia in 2 limbs on admission to rehab  - S/p Loaded Aspirin 325mg and Plavix 300mg  - C/w Aspirin 81 mg and Plavix 75 mg daily for 90 days  - HOB at 45 degrees, aspiration precautions  - PT/OT/ST/ neuropsych eval  - ILP placed 2/20  The patient requires acute rehab with 3 hours of daily therapies at least 5 out of 7 days and close physiatry follow up.     #HTN  Home meds: carvedilol 20 mg extended release PO daily, HCTZ 25 mg PO daily, Amlodipine 10 mg PO daily  - SBP goal: 110-150  - c/w HCTZ 12.5 mg once daily  - c/w carvedilol 6.25 mg q12hrs  - c/w amlodipine 10 mg once at bedtime  -c/w lisinopril 5 mg at bedtime   -Will monitor and adjust accordingly     #HLD  - c/w Lipitor 80 mg daily    #SARS-Cov-2 positive 2/9/2024  -No fever, no WBC count  -Isolation precautions  -Monitor   -re-test on 2/16 was positive, remains on isolation  - 10 days post positive PCR and asymptomatic, come off Airborne Isolation    #Diarrhea -intermittent  -3 loose stool episodes in AM 2/11/2024  -bowel regimen adjusted  - no recent complaint    #Bilat knee OA/ left knee chronic pain  - no NSAIDS for now  - Tylenol PRN for pain  - Modalities   - consider Voltaren cream and/ or Lidoderm patch and Tylenol    #GI/Bowel Mgmt:/ Constipation  - finally had BM today with relief  - Senna (held 2/2 diarrhea)   -c/w Miralax QD    - Diet: DASH/TLC    Precautions / PROPHYLAXIS:      - Falls      - DVT prophylaxis: Lovenox

## 2024-02-29 ENCOUNTER — TRANSCRIPTION ENCOUNTER (OUTPATIENT)
Age: 84
End: 2024-02-29

## 2024-02-29 RX ORDER — LISINOPRIL 2.5 MG/1
1 TABLET ORAL
Qty: 30 | Refills: 0
Start: 2024-02-29 | End: 2024-03-29

## 2024-02-29 RX ORDER — CARVEDILOL PHOSPHATE 80 MG/1
1 CAPSULE, EXTENDED RELEASE ORAL
Refills: 0 | DISCHARGE

## 2024-02-29 RX ORDER — CLOPIDOGREL BISULFATE 75 MG/1
1 TABLET, FILM COATED ORAL
Qty: 30 | Refills: 0
Start: 2024-02-29 | End: 2024-03-29

## 2024-02-29 RX ORDER — ASPIRIN/CALCIUM CARB/MAGNESIUM 324 MG
1 TABLET ORAL
Qty: 0 | Refills: 0 | DISCHARGE
Start: 2024-02-29

## 2024-02-29 RX ORDER — POLYETHYLENE GLYCOL 3350 17 G/17G
17 POWDER, FOR SOLUTION ORAL
Qty: 0 | Refills: 0 | DISCHARGE
Start: 2024-02-29

## 2024-02-29 RX ORDER — CLOPIDOGREL BISULFATE 75 MG/1
1 TABLET, FILM COATED ORAL
Qty: 0 | Refills: 0 | DISCHARGE
Start: 2024-02-29

## 2024-02-29 RX ORDER — ACETAMINOPHEN 500 MG
2 TABLET ORAL
Qty: 0 | Refills: 0 | DISCHARGE
Start: 2024-02-29

## 2024-02-29 RX ORDER — CARVEDILOL PHOSPHATE 80 MG/1
1 CAPSULE, EXTENDED RELEASE ORAL
Qty: 60 | Refills: 0
Start: 2024-02-29 | End: 2024-03-29

## 2024-02-29 RX ORDER — FAMOTIDINE 10 MG/ML
1 INJECTION INTRAVENOUS
Qty: 60 | Refills: 0
Start: 2024-02-29 | End: 2024-03-29

## 2024-02-29 RX ORDER — ATORVASTATIN CALCIUM 80 MG/1
1 TABLET, FILM COATED ORAL
Qty: 30 | Refills: 0
Start: 2024-02-29 | End: 2024-03-29

## 2024-02-29 RX ORDER — AMLODIPINE BESYLATE 2.5 MG/1
1 TABLET ORAL
Refills: 0 | DISCHARGE

## 2024-02-29 RX ORDER — DICLOFENAC SODIUM 30 MG/G
1 GEL TOPICAL
Qty: 1 | Refills: 0
Start: 2024-02-29 | End: 2024-03-29

## 2024-02-29 RX ORDER — AMLODIPINE BESYLATE 2.5 MG/1
1 TABLET ORAL
Qty: 30 | Refills: 0
Start: 2024-02-29 | End: 2024-03-29

## 2024-02-29 RX ORDER — HYDROCHLOROTHIAZIDE 25 MG
1 TABLET ORAL
Refills: 0 | DISCHARGE

## 2024-02-29 RX ADMIN — CHLORHEXIDINE GLUCONATE 1 APPLICATION(S): 213 SOLUTION TOPICAL at 05:49

## 2024-02-29 RX ADMIN — LISINOPRIL 5 MILLIGRAM(S): 2.5 TABLET ORAL at 22:51

## 2024-02-29 RX ADMIN — FAMOTIDINE 20 MILLIGRAM(S): 10 INJECTION INTRAVENOUS at 05:43

## 2024-02-29 RX ADMIN — ATORVASTATIN CALCIUM 80 MILLIGRAM(S): 80 TABLET, FILM COATED ORAL at 21:44

## 2024-02-29 RX ADMIN — POLYETHYLENE GLYCOL 3350 17 GRAM(S): 17 POWDER, FOR SOLUTION ORAL at 05:50

## 2024-02-29 RX ADMIN — AMLODIPINE BESYLATE 10 MILLIGRAM(S): 2.5 TABLET ORAL at 21:44

## 2024-02-29 RX ADMIN — FAMOTIDINE 20 MILLIGRAM(S): 10 INJECTION INTRAVENOUS at 17:27

## 2024-02-29 RX ADMIN — CLOPIDOGREL BISULFATE 75 MILLIGRAM(S): 75 TABLET, FILM COATED ORAL at 11:34

## 2024-02-29 RX ADMIN — POLYETHYLENE GLYCOL 3350 17 GRAM(S): 17 POWDER, FOR SOLUTION ORAL at 17:27

## 2024-02-29 RX ADMIN — CARVEDILOL PHOSPHATE 6.25 MILLIGRAM(S): 80 CAPSULE, EXTENDED RELEASE ORAL at 17:27

## 2024-02-29 RX ADMIN — ENOXAPARIN SODIUM 40 MILLIGRAM(S): 100 INJECTION SUBCUTANEOUS at 21:44

## 2024-02-29 RX ADMIN — Medication 81 MILLIGRAM(S): at 11:34

## 2024-02-29 NOTE — DISCHARGE NOTE PROVIDER - NSDCFUSCHEDAPPT_GEN_ALL_CORE_FT
[facility]  ELECTROPH [address]  Scheduled Appointment: 03/20/2024    Charmaine Falcon  [facility]  NEUROLOGY [address]  Scheduled Appointment: 03/29/2024    Junaid Erickson  [facility]  ONCORTHO [address]  Scheduled Appointment: 04/01/2024    [facility]  CARDIOLOGY [address]  Scheduled Appointment: 04/24/2024     [facility]  ELECTROPH [address]  Scheduled Appointment: 03/20/2024    Charmaine Falcon  [facility]  NEUROLOGY [address]  Scheduled Appointment: 03/29/2024    [facility]  CARDIOLOGY [address]  Scheduled Appointment: 04/24/2024

## 2024-02-29 NOTE — PROGRESS NOTE ADULT - SUBJECTIVE AND OBJECTIVE BOX
Patient is an 83 y. old female who presents with a chief complaint of Rehab of left cerebellar stroke with left hemiataxia.(nondominant) (07 Feb 2024 14:25)    HPI:  This is an 84 y/o RHD F with PMHx of HLD, HTN, L knee OA presenting for unsteady gait.    Patient states she was in her normal state of health a day prior to presentation. She was outside having lunch with her friend when she started to notice she was off balance that persists after coming back home. For worsening unsteady gait and imbalance she decided  to seek medical help the next day. She admits to vomiting, but denies any HA, diplopia, N, dizziness, numbness/tingling, speech difficulties. In the ED, vitals were significant for /72. CTH notable for L cerebellar subacute infarct. 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 NIHSS on admission was 3. Patient was admitted to stroke unit for further work up.     On 2/6/2204, the patient's BP was elevated requiring 15mg of hydralazine overnight. CTH was repeated for possible worsening dysmetria, end gaze nystagmus, and NIHSS increase from 0 to 2 (for upper and lower left extremity dysmetria), urgent CTH showed no intracranial hemorrhage or midline shift. Left cerebellar hypodense lesion consistent with evolving subacute infarct.    The patient was evaluated by the PM&R team once medically stable. The patient was found to have functional limitation in terms of muscle strength, endurance, physical mobility, and ability to carry out activities of daily living (self care, transfers, and ambulation). The patient was started on a course of bedside therapy, the pt is motivated and able to start 3 hours of therapy  daily for 6-7 days a week. With PT/OT,  pt required Min A w transfers, Mod A w UB dressing, Mod A w LB dressing. Amb 15' using RW requiring Mod A. SLP evaluated the patient and recommended regular with thin liquids. The patient was deemed to be a good candidate for admission for acute inpatient rehab. The patient was admitted to acute inpatient rehab on 2/7/24.    (07 Feb 2024 14:25)    TODAY'S SUBJECTIVE & REVIEW OF SYMPTOMS:    Patient was seen and examined at bedside this morning. No acute overnight events. No acute complaints. Patient in good spirits and participating/tolerating therapies well and making functional gains. She as chronic constipation, and MiraLax is helpful. Vitals reviewed and stable.     CLOF: SA for transfers, Mod A for UB dressing, Mod A for LB dressing. Ambulates 50ft with RW requiring SA.    Review of Systems:   Constitutional:    [x ] WNL           [   ] poor appetite   [   ] insomnia   [  ] tired   Cardio:                [ x ] WNL           [   ] CP   [  ] BARBOSA   [   ] palpitations               Resp:                   [ x  ] WNL           [   ] SOB   [  ] cough  [   ] wheezing   GI:                        [   ] WNL           [ x  ] constipation  [   ] diarrhea   [   ] abdominal pain   [   ] nausea   [   ] emesis                                :                      [ x  ] WNL           [   ] FRANCES  [   ] dysuria   [   ] difficulty voiding   []: Other:    Endo:                   [ x  ] WNL          [   ] polyuria   [   ] temperature intolerance                 Skin:                     [ x  ] WNL          [   ] pain   [   ] wound  [   ] rash   MSK:                    [  ] WNL          [  ] muscle pain   [ x ] joint pain/ stiffness-b/l knee pain L>R -stable  [   ] muscle tenderness   [   ] swelling   Neuro:                 [   ] WNL          [   ] HA   [   ] change in vision   [   ] tremor   [x  ] LSW weakness  [   ]dysphagia            Cognitive:           [ x ] WNL           [  ]confusion      Psych:                  [  x ] WNL           [   ] hallucinations   [   ]agitation   [   ] delusion   [   ]depression    PHYSICAL EXAM    ICU Vital Signs Last 24 Hrs  T(C): 36.7 (29 Feb 2024 12:16), Max: 36.9 (29 Feb 2024 05:11)  T(F): 98 (29 Feb 2024 12:16), Max: 98.5 (29 Feb 2024 05:11)  HR: 68 (29 Feb 2024 12:16) (57 - 70)  BP: 133/64 (29 Feb 2024 12:16) (127/57 - 140/64)  BP(mean): 92 (28 Feb 2024 20:03) (92 - 92)  ABP: --  ABP(mean): --  RR: 20 (29 Feb 2024 12:16) (19 - 20)  SpO2: --      General:[ x  ] NAD, Resting Comfortable   [   ] other:                                HEENT: [ x  ] NC/AT, EOMI,  Normal Conjunctivae,   [   ] other:   Cardio: [  x ] RRR, no murmur,   [   ] other:                              Pulm: [ x  ] No Respiratory Distress,  Lungs CTAB,   [   ] other:             Abdomen: [ x  ]ND/NT, Soft,   [   ] other:    : [ x  ] NO FRANCES CATHETER, [   ] FRANCES CATHETER present [ x ] other: PureWick  MSK: [ x ] No joint swelling, Full ROM   [   ] other:                                         Ext: [x ]No C/C/E, No calf tenderness,   [ ]other:    Skin: [   ]intact,   [ x  ] other: incision along L sternal area c/d/i.     Neurological Examination:  Cognitive: [  x  ] AAO x 3,   [  ]  other:     Attention:  [ x  ] intact   [    ]  other:             Concentration: [ x  ] intact   [    ]  other:   Language: [  ] intact  [ x ] Able to name 3 objects and describe function          Memory: [   ] intact,    [ x ]  other: Impaired, able to recall 1/3 objects   Mood/Affect: [   ] wnl  [  x  ]  other: emotional labile (crying)                                                                          Communication: [ x  ]Fluent, no dysarthria [ ] other:   CN II - XII:  [    ] intact,  [  x  ] other: horizontal  nystagmus noted  with fixation                                                                                        Motor:   RUE: 5/5: shoulder abduction, elbow flexion, elbow extension,  finger flexion  LUE: 4+/5: shoulder abduction, 4+/5 elbow flexion, elbow extension, 4+/5 finger flexion  RLE: 5/5 hip flexion, knee ext, knee flex, ankle dorsiflexion, and ankle  plantarflexion  LLE: 4+/5: hip flexion, knee ext, knee flex, ankle dorsiflexion, and ankle  plantarflexion    Tone: [  x ] wnl,   [    ]  other:  DTRs: [ x ]symmetric, [   ] other:  Coordination:   [    ] intact,   [ x ] other: +FTN, and heel to shin on the L, + L dysdiadokinesia / dysmetria.                                                                     Sensory: [ x  ] Intact to light touch,   [  ] other:    No clonus; No spasticity    MEDICATIONS  (STANDING):  amLODIPine   Tablet 10 milliGRAM(s) Oral at bedtime  aspirin enteric coated 81 milliGRAM(s) Oral daily  atorvastatin 80 milliGRAM(s) Oral at bedtime  carvedilol 6.25 milliGRAM(s) Oral every 12 hours  chlorhexidine 2% Cloths 1 Application(s) Topical <User Schedule>  clopidogrel Tablet 75 milliGRAM(s) Oral daily  dextrose 5% + sodium chloride 0.45%. 1000 milliLiter(s) (75 mL/Hr) IV Continuous <Continuous>  enoxaparin Injectable 40 milliGRAM(s) SubCutaneous every 24 hours  famotidine    Tablet 20 milliGRAM(s) Oral two times a day  hydrochlorothiazide 12.5 milliGRAM(s) Oral daily  lisinopril 5 milliGRAM(s) Oral at bedtime  polyethylene glycol 3350 17 Gram(s) Oral daily  VOLTAREN  GEL  1%  TOPICAL OINTMENT 1 Application(s) 1 Application(s) Topical three times a day    MEDICATIONS  (PRN):  acetaminophen     Tablet .. 650 milliGRAM(s) Oral every 6 hours PRN Temp greater or equal to 38C (100.4F), Mild Pain (1 - 3)  melatonin 3 milliGRAM(s) Oral at bedtime PRN Insomnia  traMADol 25 milliGRAM(s) Oral every 6 hours PRN severe pain shoulders      RECENT LABS/IMAGING    02-26    138  |  103  |  17  ----------------------------<  101<H>  3.7   |  22  |  0.7    Ca    10.4      26 Feb 2024 07:12  Mg     1.8     02-26    TPro  6.3  /  Alb  4.0  /  TBili  0.6  /  DBili  x   /  AST  23  /  ALT  38  /  AlkPhos  77  02-26                                    12.5   5.90  )-----------( 211      ( 26 Feb 2024 07:12 )             36.1

## 2024-02-29 NOTE — PROGRESS NOTE ADULT - ASSESSMENT
Assessment:  This is an 82 y/o right-handed F with PMHx of HLD, HTN, L knee OA presenting for worsening unsteady gait. In the ED, vitals were significant for /72. CTH notable for L cerebellar subacute infarct. 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 NIHSS on admission was 3 for ataxia L sided and LLE. Patient was admitted to stroke unit for further work up. On 2/6/2024, the patient's BP was elevated requiring 15mg of hydralazine overnight. CTH was repeated for possible worsening dysmetria, end gaze nystagmus, and NIHSS increase from 0 to 2 (for upper and lower left extremity dysmetria), urgent CTH showed no intracranial hemorrhage or midline shift. Left cerebellar hypodense lesion consistent with evolving subacute infarct.    The patient was evaluated by the PM&R team once medically stable. The patient was found to have functional limitation in terms of muscle strength, endurance, physical mobility, and ability to carry out activities of daily living (self care, transfers, and ambulation). The patient was started on a course of bedside therapy, the pt is motivated and able to start 3 hours of therapy  daily for 6-7 days a week. With PT/OT, pt required Min A w transfers, Mod A w UB dressing, Mod A w LB dressing. Amb 15' using RW requiring Mod A.  SLP evaluated the patient and recommended regular with thin liquids.The patient was deemed to be a good candidate for admission for acute inpatient rehab. The patient was admitted to acute inpatient rehab on 2/7/2024.     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 Normal EF  NIHSS+2= Ataxia in 2 limbs on admission to rehab  - S/p Loaded Aspirin 325mg and Plavix 300mg  - C/w Aspirin 81 mg and Plavix 75 mg daily for 90 days  - HOB at 45 degrees, aspiration precautions  - PT/OT/ST/ neuropsych eval  - ILP placed 2/20  The patient requires acute rehab with 3 hours of daily therapies at least 5 out of 7 days and close physiatry follow up.     #HTN  Home meds: carvedilol 20 mg extended release PO daily, HCTZ 25 mg PO daily, Amlodipine 10 mg PO daily  - SBP goal: 110-150  - c/w HCTZ 12.5 mg once daily  - c/w carvedilol 6.25 mg q12hrs  - c/w amlodipine 10 mg once at bedtime  -c/w lisinopril 5 mg at bedtime   -Will monitor and adjust accordingly     #HLD  - c/w Lipitor 80 mg daily    #SARS-Cov-2 positive 2/9/2024  -No fever, no WBC count  -Isolation precautions  -Monitor   -re-test on 2/16 was positive, remains on isolation  - 10 days post positive PCR and asymptomatic, come off Airborne Isolation    #S/P Diarrhea -intermittent  -3 loose stool episodes in AM 2/11/2024  -bowel regimen adjusted  - no recent complaint    #Bilat knee OA/ left knee chronic pain  - no oral NSAIDS   - Tylenol PRN for pain  - Modalities   - consider Voltaren gel TID or Lidoderm patch daily in am    #GI/Bowel Mgmt:/ Constipation  - finally had BM today with relief  - Senna (held 2/2 diarrhea)   -c/w Miralax QD    - Diet: DASH/TLC    Precautions / PROPHYLAXIS:      - Falls      - DVT prophylaxis: Lovenox

## 2024-02-29 NOTE — DISCHARGE NOTE PROVIDER - NSDCMRMEDTOKEN_GEN_ALL_CORE_FT
acetaminophen 325 mg oral tablet: 2 tab(s) orally every 6 hours As needed Temp greater or equal to 38C (100.4F), Mild Pain (1 - 3)  aspirin 81 mg oral delayed release tablet: 1 tab(s) orally once a day  atorvastatin 80 mg oral tablet: 1 tab(s) orally once a day (at bedtime)  carvedilol 6.25 mg oral tablet: 1 tab(s) orally every 12 hours  clopidogrel 75 mg oral tablet: 1 tab(s) orally once a day  diclofenac 3% topical gel: Apply topically to affected area 2 times a day  famotidine 20 mg oral tablet: 1 tab(s) orally 2 times a day  lisinopril 5 mg oral tablet: 1 tab(s) orally once a day (at bedtime)  Norvasc 10 mg oral tablet: 1 tab(s) orally once a day (at bedtime)  polyethylene glycol 3350 oral powder for reconstitution: 17 gram(s) orally once a day as needed for  constipation

## 2024-02-29 NOTE — DISCHARGE NOTE PROVIDER - NSDCCPCAREPLAN_GEN_ALL_CORE_FT
PRINCIPAL DISCHARGE DIAGNOSIS  Diagnosis: Cerebellar stroke  Assessment and Plan of Treatment: You were admitted after having left cerebellar stroke could be from blood clot or emboli or from iscmic microvascular disease ,, you were seen by neurologist started on aspirin 81 mg na dplavix for 90 days, when follow up with neurology ask them for advise on duration of these medications ,  watch for any new changes in health , like stroke symptoms with weakness , slurring of words facial asymmetry , if so call 911 , contine taking all medications  as advised . You were seen by an EP cardiologist who specializes in heart rhythm  and placed aloop recorder to see if any irregular heart beat like atrial fibrillation   was the cause for your stroke . Need to follow up with Ep doctor on 3/20  , an appointment is made for you , neurology t appointment is on 3/29 and cardiologist appointment on 4/24 .      SECONDARY DISCHARGE DIAGNOSES  Diagnosis: Hypertension  Assessment and Plan of Treatment: Your Blood pressure was high on admission , on 3 medication for BP control , now Bp is dropping little bit needing medication decrease , will hold HCTZ for now  continue taking lisiopril 5 mg in am and amlodipine or norvasc 10 mg at bed time , follow up with your cardiologist or Pmd with recorded blood pressure reading taken daily  for advise on resuming or continue to hold HCTZ, please have a strict dietary restriction, like low sodiium,heart healthy low fat and low cholesterol diet .    Diagnosis: Osteoarthritis  Assessment and Plan of Treatment: You have olga lidia knee osteoarthritis , if the pain and discomfort is severe may go to an ortho doctor for  advise on knee surgery or knee injections  ,.There is an appointment made for you with dr hill on 4/1 . . You may have to wait for atleast 3 months after stroke for any kind of intervention . continue to be active ,do  physical therapy as advised , pain patches , warm compress can help to ease of pain .    Diagnosis: 2019 novel coronavirus disease (COVID-19)  Assessment and Plan of Treatment: You were tested positive for covid 19 with mild symptoms on 2/9 . You were kept inisolation for 10 days . now you fully recovered from it , You cannot take covid vaccination for 3 months ,

## 2024-02-29 NOTE — DISCHARGE NOTE PROVIDER - CARE PROVIDERS DIRECT ADDRESSES
[email or web address]

## 2024-02-29 NOTE — DISCHARGE NOTE PROVIDER - HOSPITAL COURSE
HPI:  This is an 82 y/o RHD F with PMHx of HLD, HTN, L knee OA presenting for unsteady gait.    Patient states she was in her normal state of health a day prior to presentation. She was outside having lunch with her friend when she started to notice she was off balance that persists after coming back home. For worsening unsteady gait and imbalance she decided  to seek medical help the next day. She admits to vomiting, but denies any HA, diplopia, N, dizziness, numbness/tingling, speech difficulties. In the ED, vitals were significant for /72. CTH notable for L cerebellar subacute infarct. 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 NIHSS on admission was 3. Patient was admitted to stroke unit for further work up.     On 2/6/2204, the patient's BP was elevated requiring 15mg of hydralazine overnight. CTH was repeated for possible worsening dysmetria, end gaze nystagmus, and NIHSS increase from 0 to 2 (for upper and lower left extremity dysmetria), urgent CTH showed no intracranial hemorrhage or midline shift. Left cerebellar hypodense lesion consistent with evolving subacute infarct.  The patient was evaluated by the PM&R team once medically stable.    pt required Min A w transfers, Mod A w UB dressing, Mod A w LB dressing. Amb 15' using RW requiring Mod A. SLP evaluated the patient and recommended regular with thin liquids. The patient was deemed to be a good candidate for admission for acute inpatient rehab. The patient was admitted to acute inpatient rehab on 2/7/24 With PT/OT,  pt required Min A w transfers, Mod A w UB dressing, Mod A w LB dressing. Amb 15' using RW requiring Mod A. SLP evaluated the patient and recommended regular with thin liquids. The patient was deemed to be a good candidate for admission for acute inpatient rehab. The patient was admitted to acute inpatient rehab on 2/7/24. 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 Normal EF  NIHSS+2= Ataxia in 2 limbs on admission to rehab  - S/p Loaded Aspirin 325mg and Plavix 300mg  - C/w Aspirin 81 mg and Plavix 75 mg daily for 90 days  - HOB at 45 degrees, aspiration precautions  - PT/OT/ST/ neuropsych eval ILP placed 2/20  The patient requires acute rehab with 3 hours of daily therapies at least 5 out of 7 days and close physiatry follow up.     #HTN  Home meds: carvedilol 20 mg extended release PO daily, HCTZ 25 mg PO daily, Amlodipine 10 mg PO daily  - SBP goal: 110-150  - c/w HCTZ 12.5 mg once daily  - c/w carvedilol 6.25 mg q12hrs  - c/w amlodipine 10 mg once at bedtime  -c/w lisinopril 5 mg at bedtime   -Will monitor and adjust accordingly     #HLD  - c/w Lipitor 80 mg daily    #SARS-Cov-2 positive 2/9/2024  -No fever, no WBC count  -Isolation precautions  -Monitor   -re-test on 2/16 was positive, remains on isolation  - 10 days post positive PCR and asymptomatic, come off Airborne Iso  #HLD  - c/w Lipitor 80 mg daily    #SARS-Cov-2 positive 2/9/2024  -No fever, no WBC count  -Isolation precautions  -Monitor   -re-test on 2/16 was positive, remains on isolation  - 10 days post positive PCR and asymptomatic, come off Airborne Isolation    #Diarrhea -intermittent  -3 loose stool episodes in AM 2/11/2024  -bowel regimen adjusted  - no recent complaint    #Bilat knee OA/ left knee chronic pain  - no NSAIDS for now  - Tylenol PRN for pain  - Modalities   - consider Voltaren cream and/ or Lidoderm patch and Tylenol    #GI/Bowel Mgmt:/ Constipation  - finally had BM today with relief  - Senna (held 2/2 diarrhea)   -c/w Miralax QDDVT prophylaxis: Lovenox

## 2024-02-29 NOTE — DISCHARGE NOTE PROVIDER - CARE PROVIDER_API CALL
Charmaine Falcon  Neurology  [address] [place] [address]  Phone: [phone]  Fax: [phone]  Follow Up Time:     Junaid Erickson  Orthopaedic Surgery  [address] [place] [address]  Phone: [phone]  Fax: [phone]  Follow Up Time:     Mark Agarwal  Cardiac Electrophysiology  [address] [place] [address]  Phone: [phone]  Fax: [phone]  Follow Up Time:     Cristhian Blackwell  Family Medicine  [address] [place] [address]  Phone: [phone]  Fax: [phone]  Follow Up Time:

## 2024-02-29 NOTE — DISCHARGE NOTE PROVIDER - PROVIDER TOKENS
PROVIDER:[TOKEN:[[id number]]],PROVIDER:[TOKEN:[[id number]]],PROVIDER:[TOKEN:[[id number]]],PROVIDER:[TOKEN:[[id number]]]

## 2024-03-01 VITALS
DIASTOLIC BLOOD PRESSURE: 63 MMHG | SYSTOLIC BLOOD PRESSURE: 152 MMHG | HEART RATE: 64 BPM | TEMPERATURE: 98 F | RESPIRATION RATE: 19 BRPM

## 2024-03-01 RX ADMIN — CLOPIDOGREL BISULFATE 75 MILLIGRAM(S): 75 TABLET, FILM COATED ORAL at 11:46

## 2024-03-01 RX ADMIN — Medication 81 MILLIGRAM(S): at 11:46

## 2024-03-01 RX ADMIN — CARVEDILOL PHOSPHATE 6.25 MILLIGRAM(S): 80 CAPSULE, EXTENDED RELEASE ORAL at 05:45

## 2024-03-01 RX ADMIN — FAMOTIDINE 20 MILLIGRAM(S): 10 INJECTION INTRAVENOUS at 05:45

## 2024-03-01 RX ADMIN — CHLORHEXIDINE GLUCONATE 1 APPLICATION(S): 213 SOLUTION TOPICAL at 05:51

## 2024-03-01 RX ADMIN — POLYETHYLENE GLYCOL 3350 17 GRAM(S): 17 POWDER, FOR SOLUTION ORAL at 05:44

## 2024-03-01 NOTE — PROGRESS NOTE ADULT - PROVIDER SPECIALTY LIST ADULT
Rehab Medicine
Neuropsychology
Rehab Medicine
Neuropsychology
Neuropsychology
Rehab Medicine
Neuropsychology
Neuropsychology

## 2024-03-01 NOTE — PROGRESS NOTE ADULT - ATTENDING COMMENTS
Patient seen and examined with the resident. We discussed the case. I have directed the care. I edited the note. The patient requires acute rehab with 3 hours of daily therapies at least 5 out of 7 days and close physiatry follow up. BP controlled on discharge meds.  #Rehab for left cerebellar stroke with left (nondominant) and impaired cognition/ memory  likely due to small vessel dz vs A-A emboli  MRI brain w/out: acute infarcts involving the left cerebellar hemisphere without hemorrhagic transformation  HbA1c 5.8 , TSH 2.10 and   TTE Mildly enlarged L atria. Normal EF  NIHSS+2= Ataxia in 2 limbs on admission to rehab  - S/p Loaded Aspirin 325mg and Plavix 300mg  - C/w Aspirin 81 mg and Plavix 75 mg daily for 90 days  - HOB at 45 degrees, aspiration precautions  - PT/OT/ST/ neuropsych eval  - ILP placed 2/20  She progressed wonderfully on acute rehab and is stable for discharge home with home PT, OT, Speech. Outpatient Neuropsychologist referral.      #HTN  Home meds: carvedilol 20 mg extended release PO daily, HCTZ 25 mg PO daily, Amlodipine 10 mg PO daily  - SBP goal: 110-150  - c/w HCTZ 12.5 mg once daily  - c/w carvedilol 6.25 mg q12hrs  - c/w amlodipine 10 mg once at bedtime  -c/w lisinopril 5 mg at bedtime   -Will monitor and adjust accordingly     #HLD  - c/w Lipitor 80 mg daily    #SARS-Cov-2 positive 2/9/2024  -No fever, no WBC count  -Isolation precautions  -Monitor   -re-test on 2/16 was positive, remains on isolation  - 10 days post positive PCR and asymptomatic, come off Airborne Isolation    #S/P Diarrhea -intermittent  -3 loose stool episodes in AM 2/11/2024  -bowel regimen adjusted  - no recent complaint    #Bilat knee OA/ left knee chronic pain  - no oral NSAIDS   - Tylenol PRN for pain  - Modalities   - consider Voltaren gel TID or Lidoderm patch daily in am    #GI/Bowel Mgmt:/ Constipation  - finally had BM today with relief  - Senna (held 2/2 diarrhea)   -c/w Miralax QD

## 2024-03-01 NOTE — PROGRESS NOTE ADULT - SUBJECTIVE AND OBJECTIVE BOX
Patient is an 83 y. old female who presents with a chief complaint of Rehab of left cerebellar stroke with left hemiataxia.(nondominant) (07 Feb 2024 14:25)    HPI:  This is an 82 y/o RHD F with PMHx of HLD, HTN, L knee OA presenting for unsteady gait.    Patient states she was in her normal state of health a day prior to presentation. She was outside having lunch with her friend when she started to notice she was off balance that persists after coming back home. For worsening unsteady gait and imbalance she decided  to seek medical help the next day. She admits to vomiting, but denies any HA, diplopia, N, dizziness, numbness/tingling, speech difficulties. In the ED, vitals were significant for /72. CTH notable for L cerebellar subacute infarct. 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 NIHSS on admission was 3. Patient was admitted to stroke unit for further work up.     On 2/6/2204, the patient's BP was elevated requiring 15mg of hydralazine overnight. CTH was repeated for possible worsening dysmetria, end gaze nystagmus, and NIHSS increase from 0 to 2 (for upper and lower left extremity dysmetria), urgent CTH showed no intracranial hemorrhage or midline shift. Left cerebellar hypodense lesion consistent with evolving subacute infarct.    The patient was evaluated by the PM&R team once medically stable. The patient was found to have functional limitation in terms of muscle strength, endurance, physical mobility, and ability to carry out activities of daily living (self care, transfers, and ambulation). The patient was started on a course of bedside therapy, the pt is motivated and able to start 3 hours of therapy  daily for 6-7 days a week. With PT/OT,  pt required Min A w transfers, Mod A w UB dressing, Mod A w LB dressing. Amb 15' using RW requiring Mod A. SLP evaluated the patient and recommended regular with thin liquids. The patient was deemed to be a good candidate for admission for acute inpatient rehab. The patient was admitted to acute inpatient rehab on 2/7/24.    (07 Feb 2024 14:25)    TODAY'S SUBJECTIVE & REVIEW OF SYMPTOMS:    Patient was seen and examined at bedside this morning. No acute overnight events. No acute complaints. Patient in good spirits and participating/tolerating therapies well and making functional gains. She has chronic constipation that improves with MiraLax QD. Vitals reviewed and stable. Anticipate dc home today.     CLOF: SA for transfers, SA for UB dressing, min A for LB dressing. Ambulates 75ft with RW requiring SA.    Review of Systems:   Constitutional:    [x ] WNL           [   ] poor appetite   [   ] insomnia   [  ] tired   Cardio:                [ x ] WNL           [   ] CP   [  ] BARBOSA   [   ] palpitations               Resp:                   [ x  ] WNL           [   ] SOB   [  ] cough  [   ] wheezing   GI:                        [   ] WNL           [ x  ] constipation  [   ] diarrhea   [   ] abdominal pain   [   ] nausea   [   ] emesis                                :                      [ x  ] WNL           [   ] FRANCES  [   ] dysuria   [   ] difficulty voiding   []: Other:    Endo:                   [ x  ] WNL          [   ] polyuria   [   ] temperature intolerance                 Skin:                     [ x  ] WNL          [   ] pain   [   ] wound  [   ] rash   MSK:                    [  ] WNL          [  ] muscle pain   [ x ] joint pain/ stiffness-b/l knee pain L>R -stable  [   ] muscle tenderness   [   ] swelling   Neuro:                 [   ] WNL          [   ] HA   [   ] change in vision   [   ] tremor   [x  ] LSW weakness  [   ]dysphagia            Cognitive:           [ x ] WNL           [  ]confusion      Psych:                  [  x ] WNL           [   ] hallucinations   [   ]agitation   [   ] delusion   [   ]depression    PHYSICAL EXAM    Vital Signs Last 24 Hrs  T(C): 36.6 (01 Mar 2024 05:13), Max: 36.6 (29 Feb 2024 21:08)  T(F): 97.9 (01 Mar 2024 05:13), Max: 97.9 (01 Mar 2024 05:13)  HR: 64 (01 Mar 2024 05:13) (56 - 64)  BP: 152/63 (01 Mar 2024 05:13) (128/61 - 152/63)  BP(mean): 57 (29 Feb 2024 21:08) (57 - 57)  RR: 19 (01 Mar 2024 05:13) (19 - 20)  SpO2: --    General:[ x  ] NAD, Resting Comfortable   [   ] other:                                HEENT: [ x  ] NC/AT, EOMI,  Normal Conjunctivae,   [   ] other:   Cardio: [  x ] RRR, no murmur,   [   ] other:                              Pulm: [ x  ] No Respiratory Distress,  Lungs CTAB,   [   ] other:             Abdomen: [ x  ]ND/NT, Soft,   [   ] other:    : [ x  ] NO FRANCES CATHETER, [   ] FRANCES CATHETER present [ x ] other: PureWick  MSK: [ x ] No joint swelling, Full ROM   [   ] other:                                         Ext: [x ]No C/C/E, No calf tenderness,   [ ]other:    Skin: [   ]intact,   [ x  ] other: incision along L sternal area c/d/i.     Neurological Examination:  Cognitive: [  x  ] AAO x 3,   [  ]  other:     Attention:  [ x  ] intact   [    ]  other:             Concentration: [ x  ] intact   [    ]  other:   Language: [  ] intact  [ x ] Able to name 3 objects and describe function          Memory: [   ] intact,    [ x ]  other: Impaired, able to recall 1/3 objects   Mood/Affect: [   ] wnl  [  x  ]  other: emotional labile (crying)                                                                          Communication: [ x  ]Fluent, no dysarthria [ ] other:   CN II - XII:  [    ] intact,  [  x  ] other: horizontal  nystagmus noted  with fixation                                                                                        Motor:   RUE: 5/5: shoulder abduction, elbow flexion, elbow extension,  finger flexion  LUE: 4+/5: shoulder abduction, 4+/5 elbow flexion, elbow extension, 4+/5 finger flexion  RLE: 5/5 hip flexion, knee ext, knee flex, ankle dorsiflexion, and ankle  plantarflexion  LLE: 4+/5: hip flexion, knee ext, knee flex, ankle dorsiflexion, and ankle  plantarflexion    Tone: [  x ] wnl,   [    ]  other:  DTRs: [ x ]symmetric, [   ] other:  Coordination:   [    ] intact,   [ x ] other: +FTN, and heel to shin on the L, + L dysdiadokinesia / dysmetria.                                                                     Sensory: [ x  ] Intact to light touch,   [  ] other:    No clonus; No spasticity    MEDICATIONS  (STANDING):  amLODIPine   Tablet 10 milliGRAM(s) Oral at bedtime  aspirin enteric coated 81 milliGRAM(s) Oral daily  atorvastatin 80 milliGRAM(s) Oral at bedtime  carvedilol 6.25 milliGRAM(s) Oral every 12 hours  chlorhexidine 2% Cloths 1 Application(s) Topical <User Schedule>  clopidogrel Tablet 75 milliGRAM(s) Oral daily  dextrose 5% + sodium chloride 0.45%. 1000 milliLiter(s) (75 mL/Hr) IV Continuous <Continuous>  enoxaparin Injectable 40 milliGRAM(s) SubCutaneous every 24 hours  famotidine    Tablet 20 milliGRAM(s) Oral two times a day  hydrochlorothiazide 12.5 milliGRAM(s) Oral daily  lisinopril 5 milliGRAM(s) Oral at bedtime  polyethylene glycol 3350 17 Gram(s) Oral two times a day  VOLTAREN  GEL  1%  TOPICAL OINTMENT 1 Application(s) 1 Application(s) Topical three times a day    MEDICATIONS  (PRN):  acetaminophen     Tablet .. 650 milliGRAM(s) Oral every 6 hours PRN Temp greater or equal to 38C (100.4F), Mild Pain (1 - 3)  melatonin 3 milliGRAM(s) Oral at bedtime PRN Insomnia  traMADol 25 milliGRAM(s) Oral every 6 hours PRN severe pain shoulders    RECENT LABS/IMAGING    02-26    138  |  103  |  17  ----------------------------<  101<H>  3.7   |  22  |  0.7    Ca    10.4      26 Feb 2024 07:12  Mg     1.8     02-26    TPro  6.3  /  Alb  4.0  /  TBili  0.6  /  DBili  x   /  AST  23  /  ALT  38  /  AlkPhos  77  02-26                                    12.5   5.90  )-----------( 211      ( 26 Feb 2024 07:12 )             36.1

## 2024-03-01 NOTE — PROGRESS NOTE ADULT - ASSESSMENT
Assessment:  This is an 84 y/o right-handed F with PMHx of HLD, HTN, L knee OA presenting for worsening unsteady gait. In the ED, vitals were significant for /72. CTH notable for L cerebellar subacute infarct. 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 NIHSS on admission was 3 for ataxia L sided and LLE. Patient was admitted to stroke unit for further work up. On 2/6/2024, the patient's BP was elevated requiring 15mg of hydralazine overnight. CTH was repeated for possible worsening dysmetria, end gaze nystagmus, and NIHSS increase from 0 to 2 (for upper and lower left extremity dysmetria), urgent CTH showed no intracranial hemorrhage or midline shift. Left cerebellar hypodense lesion consistent with evolving subacute infarct.    The patient was evaluated by the PM&R team once medically stable. The patient was found to have functional limitation in terms of muscle strength, endurance, physical mobility, and ability to carry out activities of daily living (self care, transfers, and ambulation). The patient was started on a course of bedside therapy, the pt is motivated and able to start 3 hours of therapy  daily for 6-7 days a week. With PT/OT, pt required Min A w transfers, Mod A w UB dressing, Mod A w LB dressing. Amb 15' using RW requiring Mod A.  SLP evaluated the patient and recommended regular with thin liquids.The patient was deemed to be a good candidate for admission for acute inpatient rehab. The patient was admitted to acute inpatient rehab on 2/7/2024.     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 Normal EF  NIHSS+2= Ataxia in 2 limbs on admission to rehab  - S/p Loaded Aspirin 325mg and Plavix 300mg  - C/w Aspirin 81 mg and Plavix 75 mg daily for 90 days  - HOB at 45 degrees, aspiration precautions  - PT/OT/ST/ neuropsych eval  - ILP placed 2/20  The patient requires acute rehab with 3 hours of daily therapies at least 5 out of 7 days and close physiatry follow up.     #HTN  Home meds: carvedilol 20 mg extended release PO daily, HCTZ 25 mg PO daily, Amlodipine 10 mg PO daily  - SBP goal: 110-150  - c/w HCTZ 12.5 mg once daily  - c/w carvedilol 6.25 mg q12hrs  - c/w amlodipine 10 mg once at bedtime  -c/w lisinopril 5 mg at bedtime   -Will monitor and adjust accordingly     #HLD  - c/w Lipitor 80 mg daily    #SARS-Cov-2 positive 2/9/2024  -No fever, no WBC count  -Isolation precautions  -Monitor   -re-test on 2/16 was positive, remains on isolation  - 10 days post positive PCR and asymptomatic, come off Airborne Isolation    #S/P Diarrhea -intermittent  -3 loose stool episodes in AM 2/11/2024  -bowel regimen adjusted  - no recent complaint    #Bilat knee OA/ left knee chronic pain  - no oral NSAIDS   - Tylenol PRN for pain  - Modalities   - consider Voltaren gel TID or Lidoderm patch daily in am    #GI/Bowel Mgmt:/ Constipation  - finally had BM today with relief  - Senna (held 2/2 diarrhea)   -c/w Miralax QD    - Diet: DASH/TLC    Precautions / PROPHYLAXIS:      - Falls      - DVT prophylaxis: Lovenox        Assessment:  This is an 82 y/o right-handed F with PMHx of HLD, HTN, L knee OA presenting for worsening unsteady gait. In the ED, vitals were significant for /72. CTH notable for L cerebellar subacute infarct. 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 NIHSS on admission was 3 for ataxia L sided and LLE. Patient was admitted to stroke unit for further work up. On 2/6/2024, the patient's BP was elevated requiring 15mg of hydralazine overnight. CTH was repeated for possible worsening dysmetria, end gaze nystagmus, and NIHSS increase from 0 to 2 (for upper and lower left extremity dysmetria), urgent CTH showed no intracranial hemorrhage or midline shift. Left cerebellar hypodense lesion consistent with evolving subacute infarct.    The patient was evaluated by the PM&R team once medically stable. The patient was found to have functional limitation in terms of muscle strength, endurance, physical mobility, and ability to carry out activities of daily living (self care, transfers, and ambulation). The patient was started on a course of bedside therapy, the pt is motivated and able to start 3 hours of therapy  daily for 6-7 days a week. With PT/OT, pt required Min A w transfers, Mod A w UB dressing, Mod A w LB dressing. Amb 15' using RW requiring Mod A.  SLP evaluated the patient and recommended regular with thin liquids.The patient was deemed to be a good candidate for admission for acute inpatient rehab. The patient was admitted to acute inpatient rehab on 2/7/2024.     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 Normal EF  NIHSS+2= Ataxia in 2 limbs on admission to rehab  - S/p Loaded Aspirin 325mg and Plavix 300mg  - C/w Aspirin 81 mg and Plavix 75 mg daily for 90 days  - HOB at 45 degrees, aspiration precautions  - PT/OT/ST/ neuropsych eval  - ILP placed 2/20  She progressed wonderfully on acute rehab and is stable for discharge home with home PT, OT, Speech. Outpatient Neuropsychologist referral.      #HTN  Home meds: carvedilol 20 mg extended release PO daily, HCTZ 25 mg PO daily, Amlodipine 10 mg PO daily  - SBP goal: 110-150  - c/w HCTZ 12.5 mg once daily  - c/w carvedilol 6.25 mg q12hrs  - c/w amlodipine 10 mg once at bedtime  -c/w lisinopril 5 mg at bedtime   -Will monitor and adjust accordingly     #HLD  - c/w Lipitor 80 mg daily    #SARS-Cov-2 positive 2/9/2024  -No fever, no WBC count  -Isolation precautions  -Monitor   -re-test on 2/16 was positive, remains on isolation  - 10 days post positive PCR and asymptomatic, come off Airborne Isolation    #S/P Diarrhea -intermittent  -3 loose stool episodes in AM 2/11/2024  -bowel regimen adjusted  - no recent complaint    #Bilat knee OA/ left knee chronic pain  - no oral NSAIDS   - Tylenol PRN for pain  - Modalities   - consider Voltaren gel TID or Lidoderm patch daily in am    #GI/Bowel Mgmt:/ Constipation  - finally had BM today with relief  - Senna (held 2/2 diarrhea)   -c/w Miralax QD    - Diet: DASH/TLC    Precautions / PROPHYLAXIS:      - Falls      - DVT prophylaxis: Lovenox

## 2024-03-01 NOTE — PROGRESS NOTE ADULT - ASSESSMENT
Neuropsychology Follow up   Treatment Session Focused on Feedback and Psychoeducation   Patient Contact     Patient was seen on 03/01/2024 to share feedback from cognitive monitoring, which indicated some weaknesses in select aspects of executive processes which to a degree impacted memory performance. Results in other areas of cognition were relatively intact.     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 The patient will require mild supervision in activities of daily living.     The patient was provided with psychoeducation of stroke recovery, risk factors and their impact on cognition.  Additional resources provided surrounding cognitive remediation, sleep hygiene, and other healthy lifestyle habits.     The patient is expected to benefit from cognitive rehabilitation to assist in developing memory aides, and to bolster executive function. In addition, the patient would likely benefit from techniques to maximize memory skills. To assist with encoding, consolidation, and retrieval, the patient would benefit from the following recommendations: 1) the patient should minimize all distractions in the immediate environment, 2) the patient should focus on one task at a time, 3) the patient should begin using a memory notebook, daily planner, and external aides (if lists or notes are made, they should be kept in the same spot), 4) the patient should be provided with frequent verbal cues and reminders, and 5) use of alarm clocks should be utilized to remind the patient of important tasks to complete. The patient indicated she understood and agreed with the recommendations.     Progress on emotional support work was reviewed given patients variable anxiousness while admitted to .  She expressed appreciation, and troubleshooting and review of relaxation techniques and supportive network provided.  Patient expressed an interest in participating in outpatient psychotherapy for continued support for adjustment to returning home and addressing anxious symptoms.  She noted she would likely need telehealth services if possible given difficulties securing transportaiton.  Consider referral to outpatient psychotherapy.     Goals: No further goals at this time   Plan: Discharge from neuropsychological services; reconsult as needed     Brain Injury Protocol: No   Cognitive Behavioral Guidelines: No

## 2024-03-05 DIAGNOSIS — U07.1 COVID-19: ICD-10-CM

## 2024-03-05 DIAGNOSIS — I69.393 ATAXIA FOLLOWING CEREBRAL INFARCTION: ICD-10-CM

## 2024-03-05 DIAGNOSIS — R19.7 DIARRHEA, UNSPECIFIED: ICD-10-CM

## 2024-03-05 DIAGNOSIS — K59.00 CONSTIPATION, UNSPECIFIED: ICD-10-CM

## 2024-03-05 DIAGNOSIS — I69.311 MEMORY DEFICIT FOLLOWING CEREBRAL INFARCTION: ICD-10-CM

## 2024-03-05 DIAGNOSIS — Z79.82 LONG TERM (CURRENT) USE OF ASPIRIN: ICD-10-CM

## 2024-03-05 DIAGNOSIS — M17.0 BILATERAL PRIMARY OSTEOARTHRITIS OF KNEE: ICD-10-CM

## 2024-03-05 DIAGNOSIS — I69.318 OTHER SYMPTOMS AND SIGNS INVOLVING COGNITIVE FUNCTIONS FOLLOWING CEREBRAL INFARCTION: ICD-10-CM

## 2024-03-05 DIAGNOSIS — I10 ESSENTIAL (PRIMARY) HYPERTENSION: ICD-10-CM

## 2024-03-05 DIAGNOSIS — Z87.891 PERSONAL HISTORY OF NICOTINE DEPENDENCE: ICD-10-CM

## 2024-03-05 DIAGNOSIS — E83.42 HYPOMAGNESEMIA: ICD-10-CM

## 2024-03-05 DIAGNOSIS — E78.5 HYPERLIPIDEMIA, UNSPECIFIED: ICD-10-CM

## 2024-03-29 ENCOUNTER — NON-APPOINTMENT (OUTPATIENT)
Age: 84
End: 2024-03-29

## 2024-03-29 ENCOUNTER — APPOINTMENT (OUTPATIENT)
Dept: NEUROLOGY | Facility: CLINIC | Age: 84
End: 2024-03-29
Payer: MEDICARE

## 2024-03-29 VITALS
BODY MASS INDEX: 26.82 KG/M2 | HEART RATE: 84 BPM | DIASTOLIC BLOOD PRESSURE: 104 MMHG | WEIGHT: 161 LBS | OXYGEN SATURATION: 100 % | SYSTOLIC BLOOD PRESSURE: 141 MMHG | HEIGHT: 65 IN

## 2024-03-29 DIAGNOSIS — I67.9 CEREBROVASCULAR DISEASE, UNSPECIFIED: ICD-10-CM

## 2024-03-29 DIAGNOSIS — Z87.891 PERSONAL HISTORY OF NICOTINE DEPENDENCE: ICD-10-CM

## 2024-03-29 DIAGNOSIS — Z87.898 PERSONAL HISTORY OF OTHER SPECIFIED CONDITIONS: ICD-10-CM

## 2024-03-29 PROCEDURE — 99214 OFFICE O/P EST MOD 30 MIN: CPT

## 2024-03-29 NOTE — DISCUSSION/SUMMARY
[FreeTextEntry1] : Pt is a 82 yo F with PMHx of HTN, HLD, OA, former smoker, who presents for hospital f/u.  # Left cerebellar ischemic stroke: L SCA territory. CTA head/neck with L SCA occlusion, L V1 severe stenosis, and moderate proximal R subclavian stenosis. s/p ILR placement. , A1c 5.8. NIHSS 1, mRS 2. - PTA: none-> DAPT x 90 days then ASA monotherapy (on 5/8/24) - Continue high intensity statin. LDL goal<70 - F/u CTA head/neck in 8-9 mo - Continue remote cardiac monitoring - Discussed lifestyle modifications including diet and exercise  RTC in 9 mo [Goals and Counseling] : I have reviewed the goals of stroke risk factor modification. I counseled the patient on measures to reduce stroke risk, including the importance of medication compliance, risk factor control, exercise, healthy diet and avoidance of smoking. I reviewed stroke warning signs and symptoms and appropriate actions to take if such occur.

## 2024-03-29 NOTE — PHYSICAL EXAM
[General Appearance - Alert] : alert [General Appearance - In No Acute Distress] : in no acute distress [General Appearance - Well Nourished] : well nourished [General Appearance - Well Developed] : well developed [Affect] : the affect was normal [Oriented To Time, Place, And Person] : oriented to person, place, and time [Person] : oriented to person [Place] : oriented to place [Time] : oriented to time [Fluency] : fluency intact [Naming Objects] : no difficulty naming common objects [Comprehension] : comprehension intact [Cranial Nerves Optic (II)] : visual acuity intact bilaterally,  visual fields full to confrontation, pupils equal round and reactive to light [Cranial Nerves Oculomotor (III)] : extraocular motion intact [Cranial Nerves Trigeminal (V)] : facial sensation intact symmetrically [Cranial Nerves Facial (VII)] : face symmetrical [Cranial Nerves Vestibulocochlear (VIII)] : hearing was intact bilaterally [Cranial Nerves Glossopharyngeal (IX)] : tongue and palate midline [Cranial Nerves Hypoglossal (XII)] : there was no tongue deviation with protrusion [Motor Tone] : muscle tone was normal in all four extremities [Motor Strength] : muscle strength was normal in all four extremities [Motor Handedness Right-Handed] : the patient is right hand dominant [Paresis Pronator Drift Right-Sided] : no pronator drift on the right [Paresis Pronator Drift Left-Sided] : no pronator drift on the left [Sensation Tactile Decrease] : light touch was intact [Coordination - Dysmetria Impaired Finger-to-Nose Right Only] : not present on the ride side [Coordination - Dysmetria Impaired Finger-to-Nose Left Only] : present on the left side [Coordination - Dysmetria Impaired Heel-to-Shin Bilateral] : not present [2+] : Patella left 2+

## 2024-03-29 NOTE — HISTORY OF PRESENT ILLNESS
[FreeTextEntry1] : Pt is a 82 yo F with PMHx of HTN, HLD, OA, former smoker, who presents for hospital f/u. Pt had sudden onset imbalance on 2/7/24. Found to have a left cerebellar ischemic stroke. Endorses feeling well, balance has been improving. Currently using a walker, is going to set up outpt PT. Denies falls, dropping things, new weakness or numbness. Compliant with medications. Pt is able to dress herself, shower, go to the bathroom, but has not returned to driving.

## 2024-04-01 ENCOUNTER — APPOINTMENT (OUTPATIENT)
Dept: ORTHOPEDIC SURGERY | Facility: CLINIC | Age: 84
End: 2024-04-01

## 2024-04-12 ENCOUNTER — APPOINTMENT (OUTPATIENT)
Dept: ELECTROPHYSIOLOGY | Facility: CLINIC | Age: 84
End: 2024-04-12
Payer: MEDICARE

## 2024-04-12 VITALS
DIASTOLIC BLOOD PRESSURE: 80 MMHG | HEART RATE: 61 BPM | HEIGHT: 65 IN | RESPIRATION RATE: 18 BRPM | BODY MASS INDEX: 27.49 KG/M2 | WEIGHT: 165 LBS | SYSTOLIC BLOOD PRESSURE: 140 MMHG | TEMPERATURE: 97.1 F

## 2024-04-12 DIAGNOSIS — Z51.89 ENCOUNTER FOR OTHER SPECIFIED AFTERCARE: ICD-10-CM

## 2024-04-12 DIAGNOSIS — Z78.9 OTHER SPECIFIED HEALTH STATUS: ICD-10-CM

## 2024-04-12 DIAGNOSIS — Z00.00 ENCOUNTER FOR GENERAL ADULT MEDICAL EXAMINATION W/OUT ABNORMAL FINDINGS: ICD-10-CM

## 2024-04-12 DIAGNOSIS — G45.9 TRANSIENT CEREBRAL ISCHEMIC ATTACK, UNSPECIFIED: ICD-10-CM

## 2024-04-12 DIAGNOSIS — Z80.9 FAMILY HISTORY OF MALIGNANT NEOPLASM, UNSPECIFIED: ICD-10-CM

## 2024-04-12 DIAGNOSIS — I63.9 CEREBRAL INFARCTION, UNSPECIFIED: ICD-10-CM

## 2024-04-12 DIAGNOSIS — Z45.09 ENCOUNTER FOR ADJUSTMENT AND MANAGEMENT OF OTHER CARDIAC DEVICE: ICD-10-CM

## 2024-04-12 PROCEDURE — 99024 POSTOP FOLLOW-UP VISIT: CPT

## 2024-04-12 PROCEDURE — 93000 ELECTROCARDIOGRAM COMPLETE: CPT | Mod: 59

## 2024-04-12 PROCEDURE — 93285 PRGRMG DEV EVAL SCRMS IP: CPT

## 2024-04-12 PROCEDURE — 99214 OFFICE O/P EST MOD 30 MIN: CPT | Mod: 25

## 2024-04-12 RX ORDER — CARVEDILOL 6.25 MG/1
6.25 TABLET, FILM COATED ORAL TWICE DAILY
Refills: 0 | Status: ACTIVE | COMMUNITY

## 2024-04-12 RX ORDER — ATORVASTATIN CALCIUM 80 MG/1
80 TABLET, FILM COATED ORAL DAILY
Refills: 0 | Status: ACTIVE | COMMUNITY

## 2024-04-12 RX ORDER — AMLODIPINE BESYLATE 10 MG/1
10 TABLET ORAL DAILY
Refills: 0 | Status: ACTIVE | COMMUNITY

## 2024-04-12 RX ORDER — LISINOPRIL 5 MG/1
5 TABLET ORAL DAILY
Refills: 0 | Status: ACTIVE | COMMUNITY

## 2024-04-12 RX ORDER — FAMOTIDINE 20 MG/1
20 TABLET, FILM COATED ORAL DAILY
Refills: 0 | Status: ACTIVE | COMMUNITY

## 2024-04-12 RX ORDER — MELOXICAM 15 MG/1
15 TABLET ORAL
Qty: 30 | Refills: 0 | Status: ACTIVE | COMMUNITY
Start: 2024-02-01

## 2024-04-12 RX ORDER — ASPIRIN 81 MG/1
81 TABLET, COATED ORAL DAILY
Refills: 0 | Status: ACTIVE | COMMUNITY

## 2024-04-12 RX ORDER — CLOPIDOGREL BISULFATE 75 MG/1
75 TABLET, FILM COATED ORAL
Qty: 30 | Refills: 0 | Status: ACTIVE | COMMUNITY

## 2024-04-12 NOTE — HISTORY OF PRESENT ILLNESS
[de-identified] : 83F w/ a PMHx of HLD, HTN, L knee OA, former smoker She presented to [facility] 3/2/24 for unsteadiness and inability to ambulate. CTH notable for L cerebellar subacute infarct. CTA angio notable for Severe stenosis at the origin of the left vertebral artery, Moderate stenosis of the proximal right subclavian artery due to mixed calcified and noncalcified atherosclerotic plaque. Brain MRI is remarkable for Acute infarcts involving the left cerebellar hemisphere without hemorrhagic transformation. EP was consulted for evaluation for a loop recorder placement. Pt is now s/p ILR implant 2/20/2024.  She presents today for routine ILR interrogation and WCK.  Today the patient is feeling generally well with no acute complaints. Denies CP, SOB, palpitations, dizziness, syncope. She is accompanied by her daughter in-law.   EP: Dr. Agarwal Cardiologist: Dr. Everett Neuro: Dr. Falcon

## 2024-04-12 NOTE — ASSESSMENT
[FreeTextEntry1] : # CVA s/p ILR implant - Incision site well healed, clean, dry, no drainage, no redness, no bruising. I discussed with patient post-operative care in great details. I answered all questions to their satisfaction.  - Device interrogated and reprogrammed as described in procedure. Device function normal. 1 NSVT event. See Device Printout - Pt asymptomatic during tachy event. NSVT vs AT. - F/U with gen cards - Continue carvedilol 6.25mg BID - F/u with neuro as needed. Continue ASA and Plavix. Pt reports she will be stopping Plavix in near future.  # Remote monitoring - patient is enrolled - Remote monitoring was discussed with patient and daughter in-law, schedule, process, as well as associated co-pay that may not be covered by their insurance.  # HTN - BP stable - low sodium/sat fat diet encouraged - Exercise as tolerated. Pt will be starting PT.  I have also advised the patient to go to the nearest emergency room if she experiences any chest pain, dyspnea, syncope, or has any other compelling symptoms.   F/U 9-12mo / PRN

## 2024-04-12 NOTE — CARDIOLOGY SUMMARY
[de-identified] : 04/12/2024: SR, RBBB  HR 61bpm [de-identified] : 2/6/24 TTE:   1. Hyperdynamic global left ventricular systolic function with a non-hemodynamically significant mid cavitary gradient of <5mmHg. Left ventricular ejection fraction, by visual estimation, is 70 to 75%. Mild (Grade I) diastolic dysfunction. No regional wall motion abnormalities.  2. Normal right ventricular size and function.  3. Mildly enlarged left atrium.  4. Mildly sclerotic aortic valve with normal opening.  5. No echocardiographic evidence of pulmonary hypertension.  6. There is no evidence of pericardial effusion.  [de-identified] : 02/20/2024: LUKAS (BART) implant

## 2024-04-12 NOTE — PHYSICAL EXAM
[General Appearance - Well Developed] : well developed [Normal Appearance] : normal appearance [Well Groomed] : well groomed [General Appearance - Well Nourished] : well nourished [No Deformities] : no deformities [General Appearance - In No Acute Distress] : no acute distress [Heart Rate And Rhythm] : heart rate and rhythm were normal [] : no respiratory distress [Respiration, Rhythm And Depth] : normal respiratory rhythm and effort [Clean] : clean [Dry] : dry [Well-Healed] : well-healed [FreeTextEntry1] : L-sternal border, 4th intercostal space

## 2024-04-12 NOTE — PROCEDURE
[NSR] : normal sinus rhythm [Normal] : The battery status is normal. [Sensing Amplitude ___mv] : sensing amplitude was [unfilled] mv [Counters Reset] : the counters were reset [de-identified] : 64 bpm [de-identified] : [employer] [de-identified] : LINQ II [de-identified] : [id number] [de-identified] : 02/20/2024 [de-identified] : good  [de-identified] : 1 tachy episode c/w AT or possibly NSVT lasting 7 seconds with avg. v-rate = 200bpm. PVC burden: 2.2% Transmitting on [facility].

## 2024-05-14 ENCOUNTER — APPOINTMENT (OUTPATIENT)
Dept: CARDIOLOGY | Facility: CLINIC | Age: 84
End: 2024-05-14
Payer: MEDICARE

## 2024-05-14 ENCOUNTER — NON-APPOINTMENT (OUTPATIENT)
Age: 84
End: 2024-05-14

## 2024-05-14 PROCEDURE — 93298 REM INTERROG DEV EVAL SCRMS: CPT

## 2024-06-10 ENCOUNTER — APPOINTMENT (OUTPATIENT)
Dept: NEUROPSYCHOLOGY | Facility: CLINIC | Age: 84
End: 2024-06-10

## 2024-06-18 ENCOUNTER — NON-APPOINTMENT (OUTPATIENT)
Age: 84
End: 2024-06-18

## 2024-06-18 ENCOUNTER — APPOINTMENT (OUTPATIENT)
Dept: CARDIOLOGY | Facility: CLINIC | Age: 84
End: 2024-06-18
Payer: MEDICARE

## 2024-06-18 PROCEDURE — 93298 REM INTERROG DEV EVAL SCRMS: CPT

## 2024-07-23 ENCOUNTER — NON-APPOINTMENT (OUTPATIENT)
Age: 84
End: 2024-07-23

## 2024-07-23 ENCOUNTER — APPOINTMENT (OUTPATIENT)
Dept: CARDIOLOGY | Facility: CLINIC | Age: 84
End: 2024-07-23
Payer: MEDICARE

## 2024-07-23 PROCEDURE — 93298 REM INTERROG DEV EVAL SCRMS: CPT

## 2024-08-26 ENCOUNTER — APPOINTMENT (OUTPATIENT)
Dept: CARDIOLOGY | Facility: CLINIC | Age: 84
End: 2024-08-26
Payer: MEDICARE

## 2024-08-26 ENCOUNTER — NON-APPOINTMENT (OUTPATIENT)
Age: 84
End: 2024-08-26

## 2024-08-26 PROCEDURE — 93298 REM INTERROG DEV EVAL SCRMS: CPT

## 2024-09-27 ENCOUNTER — NON-APPOINTMENT (OUTPATIENT)
Age: 84
End: 2024-09-27

## 2024-09-27 ENCOUNTER — APPOINTMENT (OUTPATIENT)
Dept: CARDIOLOGY | Facility: CLINIC | Age: 84
End: 2024-09-27
Payer: MEDICARE

## 2024-09-27 PROCEDURE — 93298 REM INTERROG DEV EVAL SCRMS: CPT

## 2024-10-25 ENCOUNTER — APPOINTMENT (OUTPATIENT)
Dept: ELECTROPHYSIOLOGY | Facility: CLINIC | Age: 84
End: 2024-10-25
Payer: MEDICARE

## 2024-10-25 VITALS
TEMPERATURE: 97.1 F | SYSTOLIC BLOOD PRESSURE: 159 MMHG | HEIGHT: 66 IN | HEART RATE: 80 BPM | BODY MASS INDEX: 26.84 KG/M2 | WEIGHT: 167 LBS | DIASTOLIC BLOOD PRESSURE: 77 MMHG

## 2024-10-25 DIAGNOSIS — I63.9 CEREBRAL INFARCTION, UNSPECIFIED: ICD-10-CM

## 2024-10-25 DIAGNOSIS — Z45.09 ENCOUNTER FOR ADJUSTMENT AND MANAGEMENT OF OTHER CARDIAC DEVICE: ICD-10-CM

## 2024-10-25 DIAGNOSIS — I10 ESSENTIAL (PRIMARY) HYPERTENSION: ICD-10-CM

## 2024-10-25 DIAGNOSIS — I48.92 UNSPECIFIED ATRIAL FLUTTER: ICD-10-CM

## 2024-10-25 PROCEDURE — 93291 INTERROG DEV EVAL SCRMS IP: CPT

## 2024-10-25 PROCEDURE — 99214 OFFICE O/P EST MOD 30 MIN: CPT | Mod: 25

## 2024-10-25 RX ORDER — APIXABAN 5 MG/1
5 TABLET, FILM COATED ORAL
Qty: 180 | Refills: 0 | Status: ACTIVE | COMMUNITY
Start: 2024-10-25 | End: 1900-01-01

## 2024-10-28 PROBLEM — I48.92 ATRIAL FLUTTER: Status: ACTIVE | Noted: 2024-10-25

## 2024-10-28 PROBLEM — I10 HYPERTENSION: Status: ACTIVE | Noted: 2024-10-25

## 2024-12-20 ENCOUNTER — APPOINTMENT (OUTPATIENT)
Dept: NEUROLOGY | Facility: CLINIC | Age: 84
End: 2024-12-20

## 2025-01-03 ENCOUNTER — NON-APPOINTMENT (OUTPATIENT)
Age: 85
End: 2025-01-03

## 2025-01-03 ENCOUNTER — APPOINTMENT (OUTPATIENT)
Dept: CARDIOLOGY | Facility: CLINIC | Age: 85
End: 2025-01-03
Payer: MEDICARE

## 2025-01-03 ENCOUNTER — APPOINTMENT (OUTPATIENT)
Dept: ELECTROPHYSIOLOGY | Facility: CLINIC | Age: 85
End: 2025-01-03

## 2025-01-03 PROCEDURE — 93298 REM INTERROG DEV EVAL SCRMS: CPT

## 2025-01-05 ENCOUNTER — OUTPATIENT (OUTPATIENT)
Dept: OUTPATIENT SERVICES | Facility: HOSPITAL | Age: 85
LOS: 1 days | End: 2025-01-05
Payer: MEDICARE

## 2025-01-05 DIAGNOSIS — Z96.641 PRESENCE OF RIGHT ARTIFICIAL HIP JOINT: Chronic | ICD-10-CM

## 2025-01-05 DIAGNOSIS — I63.9 CEREBRAL INFARCTION, UNSPECIFIED: ICD-10-CM

## 2025-01-05 DIAGNOSIS — I67.9 CEREBROVASCULAR DISEASE, UNSPECIFIED: ICD-10-CM

## 2025-01-05 PROCEDURE — 70496 CT ANGIOGRAPHY HEAD: CPT | Mod: 26

## 2025-01-05 PROCEDURE — 70498 CT ANGIOGRAPHY NECK: CPT

## 2025-01-05 PROCEDURE — 70498 CT ANGIOGRAPHY NECK: CPT | Mod: 26

## 2025-01-05 PROCEDURE — 70496 CT ANGIOGRAPHY HEAD: CPT

## 2025-01-06 DIAGNOSIS — I67.9 CEREBROVASCULAR DISEASE, UNSPECIFIED: ICD-10-CM

## 2025-01-06 DIAGNOSIS — I63.9 CEREBRAL INFARCTION, UNSPECIFIED: ICD-10-CM

## 2025-01-13 ENCOUNTER — RX RENEWAL (OUTPATIENT)
Age: 85
End: 2025-01-13

## 2025-02-14 ENCOUNTER — NON-APPOINTMENT (OUTPATIENT)
Age: 85
End: 2025-02-14

## 2025-02-14 ENCOUNTER — APPOINTMENT (OUTPATIENT)
Dept: ELECTROPHYSIOLOGY | Facility: CLINIC | Age: 85
End: 2025-02-14

## 2025-02-14 VITALS
TEMPERATURE: 97.1 F | BODY MASS INDEX: 27.49 KG/M2 | SYSTOLIC BLOOD PRESSURE: 163 MMHG | HEIGHT: 65 IN | WEIGHT: 165 LBS | DIASTOLIC BLOOD PRESSURE: 79 MMHG | HEART RATE: 65 BPM

## 2025-02-14 DIAGNOSIS — I63.9 CEREBRAL INFARCTION, UNSPECIFIED: ICD-10-CM

## 2025-02-14 DIAGNOSIS — I48.92 UNSPECIFIED ATRIAL FLUTTER: ICD-10-CM

## 2025-02-14 DIAGNOSIS — Z45.09 ENCOUNTER FOR ADJUSTMENT AND MANAGEMENT OF OTHER CARDIAC DEVICE: ICD-10-CM

## 2025-02-14 PROCEDURE — 93291 INTERROG DEV EVAL SCRMS IP: CPT

## 2025-02-14 PROCEDURE — 99214 OFFICE O/P EST MOD 30 MIN: CPT | Mod: 25

## 2025-02-28 ENCOUNTER — APPOINTMENT (OUTPATIENT)
Dept: NEUROLOGY | Facility: CLINIC | Age: 85
End: 2025-02-28
Payer: MEDICARE

## 2025-02-28 VITALS
OXYGEN SATURATION: 98 % | TEMPERATURE: 97.8 F | BODY MASS INDEX: 27.49 KG/M2 | HEART RATE: 68 BPM | WEIGHT: 165 LBS | HEIGHT: 65 IN | SYSTOLIC BLOOD PRESSURE: 174 MMHG | DIASTOLIC BLOOD PRESSURE: 82 MMHG

## 2025-02-28 DIAGNOSIS — I63.9 CEREBRAL INFARCTION, UNSPECIFIED: ICD-10-CM

## 2025-02-28 PROCEDURE — 99214 OFFICE O/P EST MOD 30 MIN: CPT

## 2025-03-17 ENCOUNTER — NON-APPOINTMENT (OUTPATIENT)
Age: 85
End: 2025-03-17

## 2025-03-17 ENCOUNTER — APPOINTMENT (OUTPATIENT)
Dept: CARDIOLOGY | Facility: CLINIC | Age: 85
End: 2025-03-17
Payer: MEDICARE

## 2025-03-17 PROCEDURE — 93298 REM INTERROG DEV EVAL SCRMS: CPT

## 2025-04-16 ENCOUNTER — OUTPATIENT (OUTPATIENT)
Dept: OUTPATIENT SERVICES | Facility: HOSPITAL | Age: 85
LOS: 1 days | End: 2025-04-16
Payer: MEDICARE

## 2025-04-16 DIAGNOSIS — I63.9 CEREBRAL INFARCTION, UNSPECIFIED: ICD-10-CM

## 2025-04-16 DIAGNOSIS — Z96.641 PRESENCE OF RIGHT ARTIFICIAL HIP JOINT: Chronic | ICD-10-CM

## 2025-04-16 PROCEDURE — 97110 THERAPEUTIC EXERCISES: CPT | Mod: GP

## 2025-04-16 PROCEDURE — 97161 PT EVAL LOW COMPLEX 20 MIN: CPT | Mod: GP

## 2025-04-17 DIAGNOSIS — I63.9 CEREBRAL INFARCTION, UNSPECIFIED: ICD-10-CM

## 2025-04-21 ENCOUNTER — NON-APPOINTMENT (OUTPATIENT)
Age: 85
End: 2025-04-21

## 2025-04-21 ENCOUNTER — APPOINTMENT (OUTPATIENT)
Dept: CARDIOLOGY | Facility: CLINIC | Age: 85
End: 2025-04-21
Payer: MEDICARE

## 2025-04-21 ENCOUNTER — OUTPATIENT (OUTPATIENT)
Dept: OUTPATIENT SERVICES | Facility: HOSPITAL | Age: 85
LOS: 1 days | End: 2025-04-21

## 2025-04-21 DIAGNOSIS — I63.9 CEREBRAL INFARCTION, UNSPECIFIED: ICD-10-CM

## 2025-04-21 DIAGNOSIS — Z96.641 PRESENCE OF RIGHT ARTIFICIAL HIP JOINT: Chronic | ICD-10-CM

## 2025-04-21 PROCEDURE — 93298 REM INTERROG DEV EVAL SCRMS: CPT

## 2025-04-28 ENCOUNTER — OUTPATIENT (OUTPATIENT)
Dept: OUTPATIENT SERVICES | Facility: HOSPITAL | Age: 85
LOS: 1 days | End: 2025-04-28

## 2025-04-28 DIAGNOSIS — I63.9 CEREBRAL INFARCTION, UNSPECIFIED: ICD-10-CM

## 2025-04-28 DIAGNOSIS — Z96.641 PRESENCE OF RIGHT ARTIFICIAL HIP JOINT: Chronic | ICD-10-CM

## 2025-05-02 ENCOUNTER — OUTPATIENT (OUTPATIENT)
Dept: OUTPATIENT SERVICES | Facility: HOSPITAL | Age: 85
LOS: 1 days | End: 2025-05-02
Payer: MEDICARE

## 2025-05-02 DIAGNOSIS — Z96.641 PRESENCE OF RIGHT ARTIFICIAL HIP JOINT: Chronic | ICD-10-CM

## 2025-05-02 DIAGNOSIS — I63.9 CEREBRAL INFARCTION, UNSPECIFIED: ICD-10-CM

## 2025-05-02 PROCEDURE — 97110 THERAPEUTIC EXERCISES: CPT | Mod: GP

## 2025-05-03 DIAGNOSIS — I63.9 CEREBRAL INFARCTION, UNSPECIFIED: ICD-10-CM

## 2025-05-05 ENCOUNTER — OUTPATIENT (OUTPATIENT)
Dept: OUTPATIENT SERVICES | Facility: HOSPITAL | Age: 85
LOS: 1 days | End: 2025-05-05

## 2025-05-05 DIAGNOSIS — I63.9 CEREBRAL INFARCTION, UNSPECIFIED: ICD-10-CM

## 2025-05-05 DIAGNOSIS — Z96.641 PRESENCE OF RIGHT ARTIFICIAL HIP JOINT: Chronic | ICD-10-CM

## 2025-05-12 ENCOUNTER — OUTPATIENT (OUTPATIENT)
Dept: OUTPATIENT SERVICES | Facility: HOSPITAL | Age: 85
LOS: 1 days | End: 2025-05-12

## 2025-05-12 DIAGNOSIS — I63.9 CEREBRAL INFARCTION, UNSPECIFIED: ICD-10-CM

## 2025-05-12 DIAGNOSIS — Z96.641 PRESENCE OF RIGHT ARTIFICIAL HIP JOINT: Chronic | ICD-10-CM

## 2025-05-16 ENCOUNTER — OUTPATIENT (OUTPATIENT)
Dept: OUTPATIENT SERVICES | Facility: HOSPITAL | Age: 85
LOS: 1 days | End: 2025-05-16

## 2025-05-16 DIAGNOSIS — Z96.641 PRESENCE OF RIGHT ARTIFICIAL HIP JOINT: Chronic | ICD-10-CM

## 2025-05-16 DIAGNOSIS — I63.9 CEREBRAL INFARCTION, UNSPECIFIED: ICD-10-CM

## 2025-05-23 ENCOUNTER — NON-APPOINTMENT (OUTPATIENT)
Age: 85
End: 2025-05-23

## 2025-05-23 ENCOUNTER — APPOINTMENT (OUTPATIENT)
Dept: CARDIOLOGY | Facility: CLINIC | Age: 85
End: 2025-05-23

## 2025-05-23 ENCOUNTER — OUTPATIENT (OUTPATIENT)
Dept: OUTPATIENT SERVICES | Facility: HOSPITAL | Age: 85
LOS: 1 days | End: 2025-05-23

## 2025-05-23 DIAGNOSIS — I63.9 CEREBRAL INFARCTION, UNSPECIFIED: ICD-10-CM

## 2025-05-23 DIAGNOSIS — Z96.641 PRESENCE OF RIGHT ARTIFICIAL HIP JOINT: Chronic | ICD-10-CM

## 2025-05-23 PROCEDURE — 93298 REM INTERROG DEV EVAL SCRMS: CPT

## 2025-05-30 ENCOUNTER — OUTPATIENT (OUTPATIENT)
Dept: OUTPATIENT SERVICES | Facility: HOSPITAL | Age: 85
LOS: 1 days | End: 2025-05-30

## 2025-05-30 DIAGNOSIS — I63.9 CEREBRAL INFARCTION, UNSPECIFIED: ICD-10-CM

## 2025-05-30 DIAGNOSIS — Z96.641 PRESENCE OF RIGHT ARTIFICIAL HIP JOINT: Chronic | ICD-10-CM

## 2025-06-02 ENCOUNTER — OUTPATIENT (OUTPATIENT)
Dept: OUTPATIENT SERVICES | Facility: HOSPITAL | Age: 85
LOS: 1 days | End: 2025-06-02
Payer: MEDICARE

## 2025-06-02 DIAGNOSIS — I63.9 CEREBRAL INFARCTION, UNSPECIFIED: ICD-10-CM

## 2025-06-02 DIAGNOSIS — Z96.641 PRESENCE OF RIGHT ARTIFICIAL HIP JOINT: Chronic | ICD-10-CM

## 2025-06-02 PROCEDURE — 97110 THERAPEUTIC EXERCISES: CPT | Mod: GP

## 2025-06-03 DIAGNOSIS — I63.9 CEREBRAL INFARCTION, UNSPECIFIED: ICD-10-CM

## 2025-06-06 ENCOUNTER — OUTPATIENT (OUTPATIENT)
Dept: OUTPATIENT SERVICES | Facility: HOSPITAL | Age: 85
LOS: 1 days | End: 2025-06-06

## 2025-06-06 DIAGNOSIS — I63.9 CEREBRAL INFARCTION, UNSPECIFIED: ICD-10-CM

## 2025-06-06 DIAGNOSIS — Z96.641 PRESENCE OF RIGHT ARTIFICIAL HIP JOINT: Chronic | ICD-10-CM

## 2025-06-09 ENCOUNTER — OUTPATIENT (OUTPATIENT)
Dept: OUTPATIENT SERVICES | Facility: HOSPITAL | Age: 85
LOS: 1 days | End: 2025-06-09

## 2025-06-09 DIAGNOSIS — I63.9 CEREBRAL INFARCTION, UNSPECIFIED: ICD-10-CM

## 2025-06-09 DIAGNOSIS — Z96.641 PRESENCE OF RIGHT ARTIFICIAL HIP JOINT: Chronic | ICD-10-CM

## 2025-06-13 ENCOUNTER — OUTPATIENT (OUTPATIENT)
Dept: OUTPATIENT SERVICES | Facility: HOSPITAL | Age: 85
LOS: 1 days | End: 2025-06-13

## 2025-06-13 DIAGNOSIS — I63.9 CEREBRAL INFARCTION, UNSPECIFIED: ICD-10-CM

## 2025-06-13 DIAGNOSIS — Z96.641 PRESENCE OF RIGHT ARTIFICIAL HIP JOINT: Chronic | ICD-10-CM

## 2025-06-16 ENCOUNTER — OUTPATIENT (OUTPATIENT)
Dept: OUTPATIENT SERVICES | Facility: HOSPITAL | Age: 85
LOS: 1 days | End: 2025-06-16

## 2025-06-16 DIAGNOSIS — I63.9 CEREBRAL INFARCTION, UNSPECIFIED: ICD-10-CM

## 2025-06-16 DIAGNOSIS — Z96.641 PRESENCE OF RIGHT ARTIFICIAL HIP JOINT: Chronic | ICD-10-CM

## 2025-06-27 ENCOUNTER — NON-APPOINTMENT (OUTPATIENT)
Age: 85
End: 2025-06-27

## 2025-06-27 ENCOUNTER — APPOINTMENT (OUTPATIENT)
Dept: CARDIOLOGY | Facility: CLINIC | Age: 85
End: 2025-06-27

## 2025-06-27 PROCEDURE — 93298 REM INTERROG DEV EVAL SCRMS: CPT

## 2025-08-01 ENCOUNTER — APPOINTMENT (OUTPATIENT)
Dept: CARDIOLOGY | Facility: CLINIC | Age: 85
End: 2025-08-01
Payer: MEDICARE

## 2025-08-01 ENCOUNTER — NON-APPOINTMENT (OUTPATIENT)
Age: 85
End: 2025-08-01

## 2025-08-01 PROCEDURE — 93298 REM INTERROG DEV EVAL SCRMS: CPT

## 2025-09-04 ENCOUNTER — EMERGENCY (EMERGENCY)
Facility: HOSPITAL | Age: 85
LOS: 0 days | Discharge: HOME | End: 2025-09-04
Attending: STUDENT IN AN ORGANIZED HEALTH CARE EDUCATION/TRAINING PROGRAM
Payer: MEDICARE

## 2025-09-04 VITALS
OXYGEN SATURATION: 96 % | HEIGHT: 65 IN | HEART RATE: 78 BPM | TEMPERATURE: 98 F | DIASTOLIC BLOOD PRESSURE: 74 MMHG | SYSTOLIC BLOOD PRESSURE: 149 MMHG | WEIGHT: 169.98 LBS | RESPIRATION RATE: 18 BRPM

## 2025-09-04 DIAGNOSIS — K59.00 CONSTIPATION, UNSPECIFIED: ICD-10-CM

## 2025-09-04 DIAGNOSIS — Z96.641 PRESENCE OF RIGHT ARTIFICIAL HIP JOINT: Chronic | ICD-10-CM

## 2025-09-04 DIAGNOSIS — R19.7 DIARRHEA, UNSPECIFIED: ICD-10-CM

## 2025-09-04 LAB
ALBUMIN SERPL ELPH-MCNC: 4.4 G/DL — SIGNIFICANT CHANGE UP (ref 3.5–5.2)
ALP SERPL-CCNC: 247 U/L — HIGH (ref 30–115)
ALT FLD-CCNC: 24 U/L — SIGNIFICANT CHANGE UP (ref 0–41)
ANION GAP SERPL CALC-SCNC: 16 MMOL/L — HIGH (ref 7–14)
AST SERPL-CCNC: 29 U/L — SIGNIFICANT CHANGE UP (ref 0–41)
BASOPHILS # BLD AUTO: 0.07 K/UL — SIGNIFICANT CHANGE UP (ref 0–0.2)
BASOPHILS NFR BLD AUTO: 0.7 % — SIGNIFICANT CHANGE UP (ref 0–1)
BILIRUB SERPL-MCNC: 0.8 MG/DL — SIGNIFICANT CHANGE UP (ref 0.2–1.2)
BUN SERPL-MCNC: 15 MG/DL — SIGNIFICANT CHANGE UP (ref 10–20)
CALCIUM SERPL-MCNC: 10.6 MG/DL — HIGH (ref 8.4–10.5)
CHLORIDE SERPL-SCNC: 99 MMOL/L — SIGNIFICANT CHANGE UP (ref 98–110)
CO2 SERPL-SCNC: 27 MMOL/L — SIGNIFICANT CHANGE UP (ref 17–32)
CREAT SERPL-MCNC: 0.9 MG/DL — SIGNIFICANT CHANGE UP (ref 0.7–1.5)
EGFR: 63 ML/MIN/1.73M2 — SIGNIFICANT CHANGE UP
EGFR: 63 ML/MIN/1.73M2 — SIGNIFICANT CHANGE UP
EOSINOPHIL # BLD AUTO: 0.29 K/UL — SIGNIFICANT CHANGE UP (ref 0–0.7)
EOSINOPHIL NFR BLD AUTO: 2.9 % — SIGNIFICANT CHANGE UP (ref 0–8)
GLUCOSE SERPL-MCNC: 96 MG/DL — SIGNIFICANT CHANGE UP (ref 70–99)
HCT VFR BLD CALC: 41.8 % — SIGNIFICANT CHANGE UP (ref 37–47)
HGB BLD-MCNC: 14.5 G/DL — SIGNIFICANT CHANGE UP (ref 12–16)
IMM GRANULOCYTES NFR BLD AUTO: 0.3 % — SIGNIFICANT CHANGE UP (ref 0.1–0.3)
LIDOCAIN IGE QN: 28 U/L — SIGNIFICANT CHANGE UP (ref 7–60)
LYMPHOCYTES # BLD AUTO: 3.29 K/UL — SIGNIFICANT CHANGE UP (ref 1.2–3.4)
LYMPHOCYTES # BLD AUTO: 32.4 % — SIGNIFICANT CHANGE UP (ref 20.5–51.1)
MCHC RBC-ENTMCNC: 30.6 PG — SIGNIFICANT CHANGE UP (ref 27–31)
MCHC RBC-ENTMCNC: 34.7 G/DL — SIGNIFICANT CHANGE UP (ref 32–37)
MCV RBC AUTO: 88.2 FL — SIGNIFICANT CHANGE UP (ref 81–99)
MONOCYTES # BLD AUTO: 0.79 K/UL — HIGH (ref 0.1–0.6)
MONOCYTES NFR BLD AUTO: 7.8 % — SIGNIFICANT CHANGE UP (ref 1.7–9.3)
NEUTROPHILS # BLD AUTO: 5.69 K/UL — SIGNIFICANT CHANGE UP (ref 1.4–6.5)
NEUTROPHILS NFR BLD AUTO: 55.9 % — SIGNIFICANT CHANGE UP (ref 42.2–75.2)
NRBC BLD AUTO-RTO: 0 /100 WBCS — SIGNIFICANT CHANGE UP (ref 0–0)
PLATELET # BLD AUTO: 315 K/UL — SIGNIFICANT CHANGE UP (ref 130–400)
PMV BLD: 10.3 FL — SIGNIFICANT CHANGE UP (ref 7.4–10.4)
POTASSIUM SERPL-MCNC: 4.2 MMOL/L — SIGNIFICANT CHANGE UP (ref 3.5–5)
POTASSIUM SERPL-SCNC: 4.2 MMOL/L — SIGNIFICANT CHANGE UP (ref 3.5–5)
PROT SERPL-MCNC: 7 G/DL — SIGNIFICANT CHANGE UP (ref 6–8)
RBC # BLD: 4.74 M/UL — SIGNIFICANT CHANGE UP (ref 4.2–5.4)
RBC # FLD: 12.9 % — SIGNIFICANT CHANGE UP (ref 11.5–14.5)
SODIUM SERPL-SCNC: 142 MMOL/L — SIGNIFICANT CHANGE UP (ref 135–146)
WBC # BLD: 10.16 K/UL — SIGNIFICANT CHANGE UP (ref 4.8–10.8)
WBC # FLD AUTO: 10.16 K/UL — SIGNIFICANT CHANGE UP (ref 4.8–10.8)

## 2025-09-04 PROCEDURE — 85025 COMPLETE CBC W/AUTO DIFF WBC: CPT

## 2025-09-04 PROCEDURE — 99285 EMERGENCY DEPT VISIT HI MDM: CPT

## 2025-09-04 PROCEDURE — 99284 EMERGENCY DEPT VISIT MOD MDM: CPT | Mod: 25

## 2025-09-04 PROCEDURE — 80053 COMPREHEN METABOLIC PANEL: CPT

## 2025-09-04 PROCEDURE — 74177 CT ABD & PELVIS W/CONTRAST: CPT

## 2025-09-04 PROCEDURE — 74177 CT ABD & PELVIS W/CONTRAST: CPT | Mod: 26

## 2025-09-04 PROCEDURE — 36415 COLL VENOUS BLD VENIPUNCTURE: CPT

## 2025-09-04 PROCEDURE — 83690 ASSAY OF LIPASE: CPT

## 2025-09-05 ENCOUNTER — NON-APPOINTMENT (OUTPATIENT)
Age: 85
End: 2025-09-05

## 2025-09-05 ENCOUNTER — APPOINTMENT (OUTPATIENT)
Dept: CARDIOLOGY | Facility: CLINIC | Age: 85
End: 2025-09-05
Payer: MEDICARE

## 2025-09-05 PROCEDURE — 93298 REM INTERROG DEV EVAL SCRMS: CPT

## 2025-09-15 ENCOUNTER — TRANSCRIPTION ENCOUNTER (OUTPATIENT)
Age: 85
End: 2025-09-15

## 2025-09-22 PROBLEM — F32.9 MAJOR DEPRESSIVE DISORDER: Status: ACTIVE | Noted: 2025-09-22

## 2025-09-22 PROBLEM — F51.05 INSOMNIA DUE TO MENTAL DISORDER: Status: ACTIVE | Noted: 2025-09-22

## 2025-09-22 PROBLEM — F41.1 GENERALIZED ANXIETY DISORDER: Status: ACTIVE | Noted: 2025-09-22
